# Patient Record
Sex: MALE | Race: WHITE | Employment: OTHER | URBAN - METROPOLITAN AREA
[De-identification: names, ages, dates, MRNs, and addresses within clinical notes are randomized per-mention and may not be internally consistent; named-entity substitution may affect disease eponyms.]

---

## 2019-04-18 ENCOUNTER — APPOINTMENT (OUTPATIENT)
Dept: GENERAL RADIOLOGY | Age: 74
DRG: 191 | End: 2019-04-18
Attending: EMERGENCY MEDICINE
Payer: MEDICARE

## 2019-04-18 ENCOUNTER — HOSPITAL ENCOUNTER (INPATIENT)
Age: 74
LOS: 5 days | Discharge: HOME | DRG: 191 | End: 2019-04-23
Attending: EMERGENCY MEDICINE | Admitting: HOSPITALIST
Payer: MEDICARE

## 2019-04-18 DIAGNOSIS — I50.9 ACUTE ON CHRONIC CONGESTIVE HEART FAILURE, UNSPECIFIED HEART FAILURE TYPE (HCC): ICD-10-CM

## 2019-04-18 DIAGNOSIS — I48.91 ATRIAL FIBRILLATION WITH RAPID VENTRICULAR RESPONSE (HCC): ICD-10-CM

## 2019-04-18 DIAGNOSIS — J96.01 ACUTE RESPIRATORY FAILURE WITH HYPOXIA (HCC): ICD-10-CM

## 2019-04-18 DIAGNOSIS — J44.1 COPD EXACERBATION (HCC): Primary | ICD-10-CM

## 2019-04-18 PROBLEM — R06.03 ACUTE RESPIRATORY DISTRESS: Status: ACTIVE | Noted: 2019-04-18

## 2019-04-18 PROBLEM — R79.89 ELEVATED BRAIN NATRIURETIC PEPTIDE (BNP) LEVEL: Status: ACTIVE | Noted: 2019-04-18

## 2019-04-18 LAB
ALBUMIN SERPL-MCNC: 3 G/DL (ref 3.4–5)
ALBUMIN/GLOB SERPL: 0.6 {RATIO} (ref 0.8–1.7)
ALP SERPL-CCNC: 127 U/L (ref 45–117)
ALT SERPL-CCNC: 21 U/L (ref 16–61)
ANION GAP SERPL CALC-SCNC: 7 MMOL/L (ref 3–18)
ARTERIAL PATENCY WRIST A: YES
AST SERPL-CCNC: 13 U/L (ref 15–37)
BASE DEFICIT BLD-SCNC: 8 MMOL/L
BASOPHILS # BLD: 0 K/UL (ref 0–0.1)
BASOPHILS NFR BLD: 0 % (ref 0–2)
BDY SITE: ABNORMAL
BILIRUB SERPL-MCNC: 0.7 MG/DL (ref 0.2–1)
BNP SERPL-MCNC: 7565 PG/ML (ref 0–900)
BODY TEMPERATURE: 98.5
BUN SERPL-MCNC: 47 MG/DL (ref 7–18)
BUN/CREAT SERPL: 18 (ref 12–20)
CALCIUM SERPL-MCNC: 8.5 MG/DL (ref 8.5–10.1)
CHLORIDE SERPL-SCNC: 110 MMOL/L (ref 100–108)
CO2 SERPL-SCNC: 23 MMOL/L (ref 21–32)
CREAT SERPL-MCNC: 2.57 MG/DL (ref 0.6–1.3)
DIFFERENTIAL METHOD BLD: ABNORMAL
EOSINOPHIL # BLD: 0 K/UL (ref 0–0.4)
EOSINOPHIL NFR BLD: 0 % (ref 0–5)
ERYTHROCYTE [DISTWIDTH] IN BLOOD BY AUTOMATED COUNT: 16.6 % (ref 11.6–14.5)
EST. AVERAGE GLUCOSE BLD GHB EST-MCNC: 126 MG/DL
GAS FLOW.O2 O2 DELIVERY SYS: ABNORMAL L/MIN
GAS FLOW.O2 SETTING OXYMISER: 4.5 L/M
GLOBULIN SER CALC-MCNC: 5.1 G/DL (ref 2–4)
GLUCOSE BLD STRIP.AUTO-MCNC: 208 MG/DL (ref 70–110)
GLUCOSE BLD STRIP.AUTO-MCNC: 282 MG/DL (ref 70–110)
GLUCOSE SERPL-MCNC: 120 MG/DL (ref 74–99)
HBA1C MFR BLD: 6 % (ref 4.2–5.6)
HCO3 BLD-SCNC: 18.8 MMOL/L (ref 22–26)
HCT VFR BLD AUTO: 38.4 % (ref 36–48)
HGB BLD-MCNC: 12.1 G/DL (ref 13–16)
INR PPP: 3.1 (ref 0.8–1.2)
LYMPHOCYTES # BLD: 0.9 K/UL (ref 0.9–3.6)
LYMPHOCYTES NFR BLD: 8 % (ref 21–52)
MCH RBC QN AUTO: 26.8 PG (ref 24–34)
MCHC RBC AUTO-ENTMCNC: 31.5 G/DL (ref 31–37)
MCV RBC AUTO: 85.1 FL (ref 74–97)
MONOCYTES # BLD: 0.8 K/UL (ref 0.05–1.2)
MONOCYTES NFR BLD: 8 % (ref 3–10)
NEUTS SEG # BLD: 9.1 K/UL (ref 1.8–8)
NEUTS SEG NFR BLD: 84 % (ref 40–73)
O2/TOTAL GAS SETTING VFR VENT: 38 %
PCO2 BLD: 40 MMHG (ref 35–45)
PH BLD: 7.28 [PH] (ref 7.35–7.45)
PHENYTOIN SERPL-MCNC: 5.6 UG/ML (ref 10–20)
PLATELET # BLD AUTO: 187 K/UL (ref 135–420)
PMV BLD AUTO: 9.6 FL (ref 9.2–11.8)
PO2 BLD: 73 MMHG (ref 80–100)
POTASSIUM SERPL-SCNC: 3.6 MMOL/L (ref 3.5–5.5)
PROT SERPL-MCNC: 8.1 G/DL (ref 6.4–8.2)
PROTHROMBIN TIME: 32.2 SEC (ref 11.5–15.2)
RBC # BLD AUTO: 4.51 M/UL (ref 4.7–5.5)
SAO2 % BLD: 92 % (ref 92–97)
SERVICE CMNT-IMP: ABNORMAL
SODIUM SERPL-SCNC: 140 MMOL/L (ref 136–145)
SPECIMEN TYPE: ABNORMAL
T4 FREE SERPL-MCNC: 1.5 NG/DL (ref 0.7–1.5)
TOTAL RESP. RATE, ITRR: 20
TROPONIN I SERPL-MCNC: 0.02 NG/ML (ref 0–0.04)
TSH SERPL DL<=0.05 MIU/L-ACNC: 0.41 UIU/ML (ref 0.36–3.74)
WBC # BLD AUTO: 10.8 K/UL (ref 4.6–13.2)

## 2019-04-18 PROCEDURE — 94640 AIRWAY INHALATION TREATMENT: CPT

## 2019-04-18 PROCEDURE — 80053 COMPREHEN METABOLIC PANEL: CPT

## 2019-04-18 PROCEDURE — 93005 ELECTROCARDIOGRAM TRACING: CPT

## 2019-04-18 PROCEDURE — 74011000250 HC RX REV CODE- 250: Performed by: PHYSICIAN ASSISTANT

## 2019-04-18 PROCEDURE — 74011250636 HC RX REV CODE- 250/636: Performed by: EMERGENCY MEDICINE

## 2019-04-18 PROCEDURE — 96365 THER/PROPH/DIAG IV INF INIT: CPT

## 2019-04-18 PROCEDURE — 96375 TX/PRO/DX INJ NEW DRUG ADDON: CPT

## 2019-04-18 PROCEDURE — 82962 GLUCOSE BLOOD TEST: CPT

## 2019-04-18 PROCEDURE — 36600 WITHDRAWAL OF ARTERIAL BLOOD: CPT

## 2019-04-18 PROCEDURE — 84443 ASSAY THYROID STIM HORMONE: CPT

## 2019-04-18 PROCEDURE — 94761 N-INVAS EAR/PLS OXIMETRY MLT: CPT

## 2019-04-18 PROCEDURE — 84439 ASSAY OF FREE THYROXINE: CPT

## 2019-04-18 PROCEDURE — 74011250636 HC RX REV CODE- 250/636: Performed by: PHYSICIAN ASSISTANT

## 2019-04-18 PROCEDURE — 83036 HEMOGLOBIN GLYCOSYLATED A1C: CPT

## 2019-04-18 PROCEDURE — 87086 URINE CULTURE/COLONY COUNT: CPT

## 2019-04-18 PROCEDURE — 74011250637 HC RX REV CODE- 250/637: Performed by: PHYSICIAN ASSISTANT

## 2019-04-18 PROCEDURE — 85025 COMPLETE CBC W/AUTO DIFF WBC: CPT

## 2019-04-18 PROCEDURE — 74011000250 HC RX REV CODE- 250: Performed by: EMERGENCY MEDICINE

## 2019-04-18 PROCEDURE — 74011636637 HC RX REV CODE- 636/637: Performed by: HOSPITALIST

## 2019-04-18 PROCEDURE — 65660000001 HC RM ICU INTERMED STEPDOWN

## 2019-04-18 PROCEDURE — 99285 EMERGENCY DEPT VISIT HI MDM: CPT

## 2019-04-18 PROCEDURE — 96366 THER/PROPH/DIAG IV INF ADDON: CPT

## 2019-04-18 PROCEDURE — 96376 TX/PRO/DX INJ SAME DRUG ADON: CPT

## 2019-04-18 PROCEDURE — 74011000258 HC RX REV CODE- 258: Performed by: PHYSICIAN ASSISTANT

## 2019-04-18 PROCEDURE — 85610 PROTHROMBIN TIME: CPT

## 2019-04-18 PROCEDURE — 82803 BLOOD GASES ANY COMBINATION: CPT

## 2019-04-18 PROCEDURE — 74011000258 HC RX REV CODE- 258: Performed by: EMERGENCY MEDICINE

## 2019-04-18 PROCEDURE — 80185 ASSAY OF PHENYTOIN TOTAL: CPT

## 2019-04-18 PROCEDURE — 74011000250 HC RX REV CODE- 250

## 2019-04-18 PROCEDURE — 84484 ASSAY OF TROPONIN QUANT: CPT

## 2019-04-18 PROCEDURE — 71045 X-RAY EXAM CHEST 1 VIEW: CPT

## 2019-04-18 PROCEDURE — 74011250636 HC RX REV CODE- 250/636

## 2019-04-18 PROCEDURE — 77030013140 HC MSK NEB VYRM -A

## 2019-04-18 PROCEDURE — 83880 ASSAY OF NATRIURETIC PEPTIDE: CPT

## 2019-04-18 RX ORDER — ASPIRIN 81 MG/1
81 TABLET ORAL DAILY
Status: DISCONTINUED | OUTPATIENT
Start: 2019-04-19 | End: 2019-04-23 | Stop reason: HOSPADM

## 2019-04-18 RX ORDER — DIGOXIN 0.25 MG/ML
250 INJECTION INTRAMUSCULAR; INTRAVENOUS
Status: COMPLETED | OUTPATIENT
Start: 2019-04-18 | End: 2019-04-18

## 2019-04-18 RX ORDER — INSULIN LISPRO 100 [IU]/ML
INJECTION, SOLUTION INTRAVENOUS; SUBCUTANEOUS
Status: DISCONTINUED | OUTPATIENT
Start: 2019-04-18 | End: 2019-04-18

## 2019-04-18 RX ORDER — BENZONATATE 100 MG/1
100 CAPSULE ORAL
COMMUNITY
End: 2020-01-27

## 2019-04-18 RX ORDER — ALBUTEROL SULFATE 90 UG/1
2 AEROSOL, METERED RESPIRATORY (INHALATION)
COMMUNITY
End: 2020-07-31

## 2019-04-18 RX ORDER — LANOLIN ALCOHOL/MO/W.PET/CERES
65 CREAM (GRAM) TOPICAL DAILY
COMMUNITY

## 2019-04-18 RX ORDER — DIGOXIN 0.25 MG/ML
125 INJECTION INTRAMUSCULAR; INTRAVENOUS ONCE
Status: COMPLETED | OUTPATIENT
Start: 2019-04-18 | End: 2019-04-18

## 2019-04-18 RX ORDER — WARFARIN 1 MG/1
5 TABLET ORAL DAILY
COMMUNITY
End: 2020-01-27

## 2019-04-18 RX ORDER — METOPROLOL TARTRATE 5 MG/5ML
5 INJECTION INTRAVENOUS ONCE
Status: COMPLETED | OUTPATIENT
Start: 2019-04-18 | End: 2019-04-18

## 2019-04-18 RX ORDER — LEVOTHYROXINE SODIUM 112 UG/1
112 TABLET ORAL
COMMUNITY

## 2019-04-18 RX ORDER — ALLOPURINOL 100 MG/1
100 TABLET ORAL DAILY
Status: DISCONTINUED | OUTPATIENT
Start: 2019-04-19 | End: 2019-04-23 | Stop reason: HOSPADM

## 2019-04-18 RX ORDER — ALBUTEROL SULFATE 2.5 MG/.5ML
1.25 SOLUTION RESPIRATORY (INHALATION)
Status: COMPLETED | OUTPATIENT
Start: 2019-04-18 | End: 2019-04-18

## 2019-04-18 RX ORDER — WARFARIN SODIUM 5 MG/1
5 TABLET ORAL DAILY
Status: DISCONTINUED | OUTPATIENT
Start: 2019-04-19 | End: 2019-04-23 | Stop reason: HOSPADM

## 2019-04-18 RX ORDER — AMIODARONE HCL/D5W 450 MG/250
1 PLASTIC BAG, INJECTION (ML) INTRAVENOUS
Status: DISCONTINUED | OUTPATIENT
Start: 2019-04-19 | End: 2019-04-19

## 2019-04-18 RX ORDER — ATORVASTATIN CALCIUM 40 MG/1
40 TABLET, FILM COATED ORAL
COMMUNITY

## 2019-04-18 RX ORDER — INSULIN LISPRO 100 [IU]/ML
INJECTION, SOLUTION INTRAVENOUS; SUBCUTANEOUS
Status: DISCONTINUED | OUTPATIENT
Start: 2019-04-18 | End: 2019-04-23 | Stop reason: HOSPADM

## 2019-04-18 RX ORDER — ACETAMINOPHEN 325 MG/1
650 TABLET ORAL
Status: DISCONTINUED | OUTPATIENT
Start: 2019-04-18 | End: 2019-04-23 | Stop reason: HOSPADM

## 2019-04-18 RX ORDER — BUMETANIDE 0.25 MG/ML
1 INJECTION INTRAMUSCULAR; INTRAVENOUS ONCE
Status: DISCONTINUED | OUTPATIENT
Start: 2019-04-18 | End: 2019-04-18

## 2019-04-18 RX ORDER — MAGNESIUM SULFATE 100 %
4 CRYSTALS MISCELLANEOUS AS NEEDED
Status: DISCONTINUED | OUTPATIENT
Start: 2019-04-18 | End: 2019-04-23 | Stop reason: HOSPADM

## 2019-04-18 RX ORDER — BUMETANIDE 0.25 MG/ML
1 INJECTION INTRAMUSCULAR; INTRAVENOUS ONCE
Status: DISCONTINUED | OUTPATIENT
Start: 2019-04-19 | End: 2019-04-18 | Stop reason: SDUPTHER

## 2019-04-18 RX ORDER — METOPROLOL TARTRATE 50 MG/1
50 TABLET ORAL
Status: COMPLETED | OUTPATIENT
Start: 2019-04-18 | End: 2019-04-18

## 2019-04-18 RX ORDER — METOPROLOL TARTRATE 5 MG/5ML
INJECTION INTRAVENOUS
Status: COMPLETED
Start: 2019-04-18 | End: 2019-04-18

## 2019-04-18 RX ORDER — ASPIRIN 81 MG/1
81 TABLET ORAL DAILY
COMMUNITY
End: 2021-10-12

## 2019-04-18 RX ORDER — ALBUTEROL SULFATE 2.5 MG/.5ML
5 SOLUTION RESPIRATORY (INHALATION)
Status: COMPLETED | OUTPATIENT
Start: 2019-04-18 | End: 2019-04-18

## 2019-04-18 RX ORDER — PHENOL/SODIUM PHENOLATE
20 AEROSOL, SPRAY (ML) MUCOUS MEMBRANE DAILY
COMMUNITY
End: 2021-05-05 | Stop reason: DRUGHIGH

## 2019-04-18 RX ORDER — PHENYTOIN SODIUM 100 MG/1
300 CAPSULE, EXTENDED RELEASE ORAL
Status: DISCONTINUED | OUTPATIENT
Start: 2019-04-18 | End: 2019-04-23 | Stop reason: HOSPADM

## 2019-04-18 RX ORDER — ARFORMOTEROL TARTRATE 15 UG/2ML
15 SOLUTION RESPIRATORY (INHALATION)
Status: DISCONTINUED | OUTPATIENT
Start: 2019-04-18 | End: 2019-04-23 | Stop reason: HOSPADM

## 2019-04-18 RX ORDER — MELATONIN
2000 DAILY
Status: DISCONTINUED | OUTPATIENT
Start: 2019-04-19 | End: 2019-04-23 | Stop reason: HOSPADM

## 2019-04-18 RX ORDER — BUMETANIDE 0.25 MG/ML
1 INJECTION INTRAMUSCULAR; INTRAVENOUS EVERY 12 HOURS
Status: DISCONTINUED | OUTPATIENT
Start: 2019-04-19 | End: 2019-04-19

## 2019-04-18 RX ORDER — ONDANSETRON 4 MG/1
4 TABLET, ORALLY DISINTEGRATING ORAL
Status: DISCONTINUED | OUTPATIENT
Start: 2019-04-18 | End: 2019-04-23 | Stop reason: HOSPADM

## 2019-04-18 RX ORDER — BUDESONIDE 0.5 MG/2ML
500 INHALANT ORAL
Status: DISCONTINUED | OUTPATIENT
Start: 2019-04-18 | End: 2019-04-23 | Stop reason: HOSPADM

## 2019-04-18 RX ORDER — DEXTROSE MONOHYDRATE 25 G/50ML
25-50 INJECTION, SOLUTION INTRAVENOUS AS NEEDED
Status: DISCONTINUED | OUTPATIENT
Start: 2019-04-18 | End: 2019-04-23 | Stop reason: HOSPADM

## 2019-04-18 RX ORDER — ALLOPURINOL 100 MG/1
100 TABLET ORAL DAILY
COMMUNITY
End: 2021-03-17 | Stop reason: DRUGHIGH

## 2019-04-18 RX ORDER — IPRATROPIUM BROMIDE AND ALBUTEROL SULFATE 2.5; .5 MG/3ML; MG/3ML
3 SOLUTION RESPIRATORY (INHALATION)
Status: DISCONTINUED | OUTPATIENT
Start: 2019-04-18 | End: 2019-04-18

## 2019-04-18 RX ORDER — FINASTERIDE 5 MG/1
5 TABLET, FILM COATED ORAL DAILY
COMMUNITY
End: 2020-01-27

## 2019-04-18 RX ORDER — METOPROLOL TARTRATE 100 MG/1
100 TABLET ORAL 2 TIMES DAILY
COMMUNITY
End: 2020-01-27

## 2019-04-18 RX ORDER — ATORVASTATIN CALCIUM 40 MG/1
40 TABLET, FILM COATED ORAL DAILY
Status: DISCONTINUED | OUTPATIENT
Start: 2019-04-19 | End: 2019-04-23 | Stop reason: HOSPADM

## 2019-04-18 RX ORDER — CHOLECALCIFEROL (VITAMIN D3) 125 MCG
1000 CAPSULE ORAL DAILY
COMMUNITY

## 2019-04-18 RX ORDER — WARFARIN 2.5 MG/1
5 TABLET ORAL DAILY
Status: DISCONTINUED | OUTPATIENT
Start: 2019-04-19 | End: 2019-04-18

## 2019-04-18 RX ORDER — FUROSEMIDE 10 MG/ML
40 INJECTION INTRAMUSCULAR; INTRAVENOUS
Status: COMPLETED | OUTPATIENT
Start: 2019-04-18 | End: 2019-04-18

## 2019-04-18 RX ORDER — DIGOXIN 0.25 MG/ML
INJECTION INTRAMUSCULAR; INTRAVENOUS
Status: COMPLETED
Start: 2019-04-18 | End: 2019-04-18

## 2019-04-18 RX ORDER — BUMETANIDE 0.25 MG/ML
1 INJECTION INTRAMUSCULAR; INTRAVENOUS ONCE
Status: COMPLETED | OUTPATIENT
Start: 2019-04-18 | End: 2019-04-18

## 2019-04-18 RX ORDER — TAMSULOSIN HYDROCHLORIDE 0.4 MG/1
0.4 CAPSULE ORAL DAILY
Status: DISCONTINUED | OUTPATIENT
Start: 2019-04-19 | End: 2019-04-23 | Stop reason: HOSPADM

## 2019-04-18 RX ORDER — METOPROLOL TARTRATE 5 MG/5ML
5 INJECTION INTRAVENOUS
Status: COMPLETED | OUTPATIENT
Start: 2019-04-18 | End: 2019-04-18

## 2019-04-18 RX ORDER — PHENYTOIN SODIUM 100 MG/1
300 CAPSULE, EXTENDED RELEASE ORAL
COMMUNITY
End: 2021-05-05

## 2019-04-18 RX ORDER — TAMSULOSIN HYDROCHLORIDE 0.4 MG/1
0.4 CAPSULE ORAL DAILY
COMMUNITY
End: 2020-01-27

## 2019-04-18 RX ORDER — PANTOPRAZOLE SODIUM 40 MG/1
40 TABLET, DELAYED RELEASE ORAL
Status: DISCONTINUED | OUTPATIENT
Start: 2019-04-19 | End: 2019-04-20

## 2019-04-18 RX ORDER — DILTIAZEM HYDROCHLORIDE 5 MG/ML
20 INJECTION INTRAVENOUS
Status: COMPLETED | OUTPATIENT
Start: 2019-04-18 | End: 2019-04-18

## 2019-04-18 RX ADMIN — DIGOXIN 125 MCG: 250 INJECTION, SOLUTION INTRAMUSCULAR; INTRAVENOUS; PARENTERAL at 21:33

## 2019-04-18 RX ADMIN — DIGOXIN 250 MCG: 0.25 INJECTION INTRAMUSCULAR; INTRAVENOUS at 14:21

## 2019-04-18 RX ADMIN — METHYLPREDNISOLONE SODIUM SUCCINATE 125 MG: 125 INJECTION, POWDER, FOR SOLUTION INTRAMUSCULAR; INTRAVENOUS at 10:26

## 2019-04-18 RX ADMIN — DILTIAZEM HYDROCHLORIDE 10 MG/HR: 5 INJECTION INTRAVENOUS at 10:30

## 2019-04-18 RX ADMIN — FUROSEMIDE 40 MG: 10 INJECTION, SOLUTION INTRAMUSCULAR; INTRAVENOUS at 11:48

## 2019-04-18 RX ADMIN — ALBUTEROL SULFATE 5 MG: 2.5 SOLUTION RESPIRATORY (INHALATION) at 10:23

## 2019-04-18 RX ADMIN — PHENYTOIN SODIUM 300 MG: 100 CAPSULE ORAL at 23:35

## 2019-04-18 RX ADMIN — METOPROLOL TARTRATE 5 MG: 5 INJECTION INTRAVENOUS at 15:36

## 2019-04-18 RX ADMIN — INSULIN LISPRO 4 UNITS: 100 INJECTION, SOLUTION INTRAVENOUS; SUBCUTANEOUS at 18:24

## 2019-04-18 RX ADMIN — INSULIN LISPRO 6 UNITS: 100 INJECTION, SOLUTION INTRAVENOUS; SUBCUTANEOUS at 22:00

## 2019-04-18 RX ADMIN — BUMETANIDE 1 MG: 0.25 INJECTION INTRAMUSCULAR; INTRAVENOUS at 18:12

## 2019-04-18 RX ADMIN — DIGOXIN 250 MCG: 0.25 INJECTION INTRAMUSCULAR; INTRAVENOUS at 16:12

## 2019-04-18 RX ADMIN — METOPROLOL TARTRATE 5 MG: 5 INJECTION INTRAVENOUS at 16:00

## 2019-04-18 RX ADMIN — DILTIAZEM HYDROCHLORIDE 20 MG: 5 INJECTION INTRAVENOUS at 10:25

## 2019-04-18 RX ADMIN — METOPROLOL TARTRATE 50 MG: 50 TABLET ORAL at 18:12

## 2019-04-18 RX ADMIN — ALBUTEROL SULFATE 1.25 MG: 2.5 SOLUTION RESPIRATORY (INHALATION) at 11:03

## 2019-04-18 RX ADMIN — ARFORMOTEROL TARTRATE 15 MCG: 15 SOLUTION RESPIRATORY (INHALATION) at 19:43

## 2019-04-18 RX ADMIN — BUDESONIDE 500 MCG: 0.5 INHALANT RESPIRATORY (INHALATION) at 19:43

## 2019-04-18 RX ADMIN — SODIUM CHLORIDE 500 MG: 900 INJECTION, SOLUTION INTRAVENOUS at 23:35

## 2019-04-18 RX ADMIN — DILTIAZEM HYDROCHLORIDE 20 MG/HR: 5 INJECTION INTRAVENOUS at 21:35

## 2019-04-18 RX ADMIN — METOPROLOL TARTRATE 5 MG: 5 INJECTION INTRAVENOUS at 11:45

## 2019-04-18 RX ADMIN — METHYLPREDNISOLONE SODIUM SUCCINATE 60 MG: 40 INJECTION, POWDER, FOR SOLUTION INTRAMUSCULAR; INTRAVENOUS at 18:12

## 2019-04-18 NOTE — ED PROVIDER NOTES
EMERGENCY DEPARTMENT HISTORY AND PHYSICAL EXAM 
 
10:00 AM 
 
 
Date: 4/18/2019 Patient Name: Makayla Lau History of Presenting Illness Chief Complaint Patient presents with  Shortness of Breath  COPD History Provided By: patient Additional History (Context): Makayla Lau is a 69 y.o. male presents with shortness of breath and wheezing for 1 week. Previously seen by his primary care doctor 1 week ago and not any better. No chest pain. His symptoms are severe and constant. Other significant history heart valve replacement, on Coumadin, stroke, CHF, MI, History and review of systems limited by poor historian. Rosamaria Isaacs PCP: Kiya Upton MD 
 
Chief Complaint:  
Duration:   
Timing: Location:  
Quality:  
Severity:  
Modifying Factors:  
Associated Symptoms:  
 
 
Current Facility-Administered Medications Medication Dose Route Frequency Provider Last Rate Last Dose  dilTIAZem (CARDIZEM) 125 mg in 0.9% sodium chloride 125 mL infusion  10 mg/hr IntraVENous TITRATE Delmi COWART MD 20 mL/hr at 04/18/19 1122 20 mg/hr at 04/18/19 1122 Past History Past Medical History: 
Past Medical History:  
Diagnosis Date  Chronic obstructive pulmonary disease ([facility]  MI (myocardial infarction) ([facility] Past Surgical History: 
Past Surgical History:  
Procedure Laterality Date  CARDIAC SURG PROCEDURE UNLIST Family History: No family history on file. Social History: 
Social History Tobacco Use  Smoking status: Current Every Day Smoker  Smokeless tobacco: Never Used Substance Use Topics  Alcohol use: Not on file  Drug use: Not on file Allergies: 
No Known Allergies Review of Systems Review of Systems Constitutional: Negative for diaphoresis and fever. HENT: Negative for congestion and sore throat. Eyes: Negative for pain and itching. Respiratory: Positive for cough, shortness of breath and wheezing. Cardiovascular: Negative for chest pain and palpitations. Gastrointestinal: Negative for abdominal pain and diarrhea. Endocrine: Negative for polydipsia and polyuria. Genitourinary: Negative for dysuria and hematuria. Musculoskeletal: Negative for arthralgias and myalgias. Skin: Negative for rash and wound. Neurological: Negative for seizures and syncope. Hematological: Does not bruise/bleed easily. Psychiatric/Behavioral: Negative for agitation and hallucinations. Physical Exam  
 
 
Patient Vitals for the past 12 hrs: 
 Temp Pulse Resp BP SpO2  
04/18/19 1145  (!) 148  119/84   
04/18/19 1115  (!) 150 24 135/84 97 % 04/18/19 1103  (!) 160  120/87   
04/18/19 1100  (!) 159 28 120/87 99 % 04/18/19 1057  (!) 176 30 113/84 93 % 04/18/19 1045  (!) 159 28 119/74 92 % 04/18/19 1030  (!) 155 28 128/86 97 % 04/18/19 1023  (!) 150  (!) 137/92   
04/18/19 1020  (!) 146 26 (!) 137/92 98 % 04/18/19 0900    129/78 (!) 88 % 04/18/19 0855 98.5 °F (36.9 °C) 77 22 132/89 (!) 87 % Physical Exam  
Constitutional: He appears well-developed and well-nourished. HENT:  
Head: Normocephalic and atraumatic. Eyes: Conjunctivae are normal. No scleral icterus. Neck: Normal range of motion. Neck supple. No JVD present. Cardiovascular: Normal heart sounds. 4 intact extremity pulses tachycardic irregular irregular rhythm. Pulmonary/Chest: He is in respiratory distress. He has wheezes. Abdominal: Soft. He exhibits no mass. There is no tenderness. Reducible periumbilical hernia. Nontender. Musculoskeletal: Normal range of motion. He exhibits no edema. Lymphadenopathy:  
  He has no cervical adenopathy. Neurological: He is alert. Skin: Skin is warm and dry. Nursing note and vitals reviewed. Diagnostic Study Results Labs - Recent Results (from the past 12 hour(s)) EKG, 12 LEAD, INITIAL Collection Time: 04/18/19  9:16 AM  
Result Value Ref Range Ventricular Rate 162 BPM  
 Atrial Rate 162 BPM  
 P-R Interval 128 ms QRS Duration 80 ms  
 Q-T Interval 266 ms  
 QTC Calculation (Bezet) 436 ms Calculated R Axis 103 degrees Calculated T Axis 110 degrees Diagnosis Sinus tachycardia Rightward axis Pulmonary disease pattern Nonspecific ST abnormality Abnormal ECG No previous ECGs available CBC WITH AUTOMATED DIFF Collection Time: 04/18/19 10:00 AM  
Result Value Ref Range WBC 10.8 4.6 - 13.2 K/uL  
 RBC 4.51 (L) 4.70 - 5.50 M/uL  
 HGB 12.1 (L) 13.0 - 16.0 g/dL HCT 38.4 36.0 - 48.0 % MCV 85.1 74.0 - 97.0 FL  
 MCH 26.8 24.0 - 34.0 PG  
 MCHC 31.5 31.0 - 37.0 g/dL  
 RDW 16.6 (H) 11.6 - 14.5 % PLATELET 481 178 - 337 K/uL MPV 9.6 9.2 - 11.8 FL  
 NEUTROPHILS 84 (H) 40 - 73 % LYMPHOCYTES 8 (L) 21 - 52 % MONOCYTES 8 3 - 10 % EOSINOPHILS 0 0 - 5 % BASOPHILS 0 0 - 2 %  
 ABS. NEUTROPHILS 9.1 (H) 1.8 - 8.0 K/UL  
 ABS. LYMPHOCYTES 0.9 0.9 - 3.6 K/UL  
 ABS. MONOCYTES 0.8 0.05 - 1.2 K/UL  
 ABS. EOSINOPHILS 0.0 0.0 - 0.4 K/UL  
 ABS. BASOPHILS 0.0 0.0 - 0.1 K/UL  
 DF AUTOMATED METABOLIC PANEL, COMPREHENSIVE Collection Time: 04/18/19 10:00 AM  
Result Value Ref Range Sodium 140 136 - 145 mmol/L Potassium 3.6 3.5 - 5.5 mmol/L Chloride 110 (H) 100 - 108 mmol/L  
 CO2 23 21 - 32 mmol/L Anion gap 7 3.0 - 18 mmol/L Glucose 120 (H) 74 - 99 mg/dL BUN 47 (H) 7.0 - 18 MG/DL Creatinine 2.57 (H) 0.6 - 1.3 MG/DL  
 BUN/Creatinine ratio 18 12 - 20 GFR est AA 30 (L) >60 ml/min/1.73m2 GFR est non-AA 25 (L) >60 ml/min/1.73m2 Calcium 8.5 8.5 - 10.1 MG/DL Bilirubin, total 0.7 0.2 - 1.0 MG/DL  
 ALT (SGPT) 21 16 - 61 U/L  
 AST (SGOT) 13 (L) 15 - 37 U/L Alk. phosphatase 127 (H) 45 - 117 U/L Protein, total 8.1 6.4 - 8.2 g/dL Albumin 3.0 (L) 3.4 - 5.0 g/dL Globulin 5.1 (H) 2.0 - 4.0 g/dL A-G Ratio 0.6 (L) 0.8 - 1.7 PROTHROMBIN TIME + INR  Collection Time: 04/18/19 10:00 AM  
 Result Value Ref Range Prothrombin time 32.2 (H) 11.5 - 15.2 sec INR 3.1 (H) 0.8 - 1.2 NT-PRO BNP Collection Time: 04/18/19 10:00 AM  
Result Value Ref Range NT pro-BNP 7,565 (H) 0 - 900 PG/ML  
TROPONIN I Collection Time: 04/18/19 10:00 AM  
Result Value Ref Range Troponin-I, QT 0.02 0.0 - 0.045 NG/ML Radiologic Studies -  
XR CHEST PORT    (Results Pending) No results found. Medications ordered:  
Medications  
dilTIAZem (CARDIZEM) 125 mg in 0.9% sodium chloride 125 mL infusion (20 mg/hr IntraVENous Rate Change 4/18/19 1122)  
albuterol CONCENTRATE 2.5mg/0.5 mL neb soln (5 mg Nebulization Given 4/18/19 1023) methylPREDNISolone (PF) (Solu-MEDROL) injection 125 mg (125 mg IntraVENous Given 4/18/19 1026)  
dilTIAZem (CARDIZEM) injection 20 mg (20 mg IntraVENous Given 4/18/19 1025)  
albuterol CONCENTRATE 2.5mg/0.5 mL neb soln (1.25 mg Nebulization Given 4/18/19 1103) metoprolol (LOPRESSOR) injection 5 mg (5 mg IntraVENous Given 4/18/19 1145) furosemide (LASIX) injection 40 mg (40 mg IntraVENous Given 4/18/19 1148) Medical Decision Making Initial Medical Decision Making and DDx: 
He also has a history of atrial fibrillation and appears to be in A. fib with rapid ventricular response. He did not take any of his morning medications. Also consistent with COPD exacerbation. Doubt CHF pneumonia PE. 
ED Course: Progress Notes, Reevaluation, and Consults: 
ED Course as of Apr 18 1152 Thu Apr 18, 2019  
1134 Heart rate is still 160, with diltiazem at 20. Will add 5 of Lopressor. Still prominent wheezing, 91% on nasal cannula. [CB] 1136 Critical care time 35 minutes related to hypoxia and atrial fibrillation with rapid ventricular response, managing Cardizem and metoprolol.  
 [CB] [facility] [facility] hospitalist  
 [CB] ED Course User Index 
[CB] Lanny Farias MD  
 
 
I am the first provider for this patient. I reviewed the vital signs, available nursing notes, past medical history, past surgical history, family history and social history. Patient Vitals for the past 12 hrs: 
 Temp Pulse Resp BP SpO2  
04/18/19 1145  (!) 148  119/84   
04/18/19 1115  (!) 150 24 135/84 97 % 04/18/19 1103  (!) 160  120/87   
04/18/19 1100  (!) 159 28 120/87 99 % 04/18/19 1057  (!) 176 30 113/84 93 % 04/18/19 1045  (!) 159 28 119/74 92 % 04/18/19 1030  (!) 155 28 128/86 97 % 04/18/19 1023  (!) 150  (!) 137/92   
04/18/19 1020  (!) 146 26 (!) 137/92 98 % 04/18/19 0900    129/78 (!) 88 % 04/18/19 0855 98.5 °F (36.9 °C) 77 22 132/89 (!) 87 % Vital Signs-Reviewed the patient's vital signs. Pulse Oximetry Analysis, Cardiac Monitor, 12 lead ekg: 
   Narrow complex tachycardia at a rate of 160, 12-lead 9:16 AM sinus tachycardia at 162 more likely atrial fibrillation with rapid ventricular response Interpreted by the EP. 
 
11:52 AM discussed with Dr. Calvillo Pac hospitalist who will see and admit, COPD exacerbation, fluid overload, albuterol Lasix and steroids given, atrial fibrillation with rapid ventricular response IV Cardizem and Lopressor given, patient's home meds for this given. Records Reviewed: Nursing notes reviewed (Time of Review: 10:00 AM) Procedures:  
Critical Care Time:  
Aspirin: (was aspirin given for stroke?) Diagnosis Clinical Impression: 1. COPD exacerbation ([address] 2. Acute respiratory failure with hypoxia ([address] 3. Acute on chronic congestive heart failure, unspecified heart failure type ([address] 4. Atrial fibrillation with rapid ventricular response ([facility] [address] Disposition: Admitted Follow-up Information None Patient's Medications No medications on file  
 
_______________________________ Notes:   
JohnMD shellie Rayo     
_______________________________

## 2019-04-18 NOTE — PROGRESS NOTES
Chart reviewed and pt verified demographics. He was wheezing and sounded poorly, but able to speak and said he was ok to interview. He lives with his wife and his daughter Adrianna Woods in [address] [place] [address]. He has [place] a and b. He has PCP Dr Lance Pratt at the [place] [id number]. He says he does not go to [facility] [place], because they almost killed him. He says he see Cardiology at the [place] [address] also, he does not know name because they partner with [facility] and the Doctors rotate. He also states he has not been to [place] [address] for Cardiologist in about a year and a half. He states his wife has Parkinson's Disease and that she is using the walker he had been prescribed. He sounded capable but three things I noted during charting. He told me he has last seen his PCP in Oct 2018, but told the ER Doctor he went to PCP last week. He told me he did not have any respiratory issues in the past, but ER states he has history of COPD. And he told me he did not want a H visit \" because his daughters home was too small\", which does not make sense. During my interview, he was wheezing and maybe just gave wrong answers because he did not feel good or  Did not care about my interview. Reason for Admission:   COPD, Acute respiratory distress, a fib with RVR 
                
RRAT Score:       5 Plan for utilizing home health:    I discussed and offered [facility] home visit, but he says he can't get It right now . He says he and his wife live with daughter Adrianna Woods in [address] and that it is too small, the [facility] would have to wait until daughter also bought the house next door. I asked him what he meant, because the [facility] visit was one time, one short visit. He said \" no, not now\". Current Advanced Directive/Advance Care Plan: no 
 
Likelihood of Readmission:  Yellow/mod Transition of Care Plan:     Home vs home with home health Patient has designated ______wife_________ to participate in his/her discharge plan and to receive any needed information. Name: Mor Smith Address: 
Phone number: [phone], pt states she has no cell phone. Care Management Interventions PCP Verified by CM: Yes(He told me Oct 2018) Mode of Transport at Discharge: Self Transition of Care Consult (CM Consult): Discharge Planning MyChart Signup: No 
Discharge Durable Medical Equipment: No 
Physical Therapy Consult: No 
Occupational Therapy Consult: No 
Speech Therapy Consult: No 
Current Support Network: Lives with Spouse, Relative's Home(He says its his daughter's home and she lives there also) Confirm Follow Up Transport: Self Plan discussed with Pt/Family/Caregiver:  Yes

## 2019-04-18 NOTE — ED NOTES
[facility] present and new orders received.

## 2019-04-18 NOTE — ED TRIAGE NOTES
Pt c/o increased SOB since seeing  on Tuesday. States was prescribed medications but continues to get worse.

## 2019-04-18 NOTE — ED NOTES
TRANSFER - ED to INPATIENT REPORT: 
 
SBAR report made available to receiving floor on this patient being transferred to [address] [place] [address] ([facility])  for change in patient condition(Acute Respiratory Distress) Admitting diagnosis Acute respiratory distress [R06.03] Information from the following report(s) SBAR, Kardex, ED Summary, Intake/Output, MAR, Recent Results, Med Rec Status and Cardiac Rhythm Sinus Tach w/history of A-Fib was made available to receiving floor. Lines:  
Peripheral IV 04/18/19 Right Hand (Active) Site Assessment Clean, dry, & intact 4/18/2019 11:07 AM  
Phlebitis Assessment 0 4/18/2019 11:07 AM  
Infiltration Assessment 0 4/18/2019 11:07 AM  
Dressing Status Clean, dry, & intact 4/18/2019 11:07 AM  
Dressing Type Transparent 4/18/2019 11:07 AM  
  
 
Medication list updated today Opportunity for questions and clarification was provided. Patient is oriented to time, place, person and situation High alert med Patient is  continent and ambulatory with assist 
  
Valuables transported with patient credit cards which patient wants to get to wife. Patient transported with: 
 Monitor O2 @ 2 liters Patient's medications from home Registered Nurse MAP (Monitor): 85 =Monitored (most recent) Vitals w/ MEWS Score (last day) Date/Time MEWS Score Pulse Resp Temp BP Level of Consciousness SpO2  
 04/18/19 1315    153  (Abnormal)   20    128/77    92 % 04/18/19 1145    148  (Abnormal)       119/84      
 04/18/19 1115    150  (Abnormal)   24    135/84    97 % 04/18/19 1103    160  (Abnormal)       120/87      
 04/18/19 1100    159  (Abnormal)   28    120/87    99 % 04/18/19 1057    176  (Abnormal)   30    113/84    93 % 04/18/19 1045    159  (Abnormal)   28    119/74    92 % 04/18/19 1030    155  (Abnormal)   28    128/86    97 % 04/18/19 1023    150  (Abnormal)       137/92  (Abnormal)      04/18/19 1020    146  (Abnormal)   26    137/92  (Abnormal)     98 % 04/18/19 0900          129/78    88 %  (Abnormal) 04/18/19 0855  2  77  22  98.5 °F (36.9 °C)  132/89  Alert  87 %  (Abnormal) Report to Alycia Locke came and evaluated pt and patient accepted on [place].

## 2019-04-18 NOTE — ED NOTES
Bedside report updated with Carolny Farias, and Cardizem drip verified. Medications in valuables bags and to Pharmacy, Carolyn Farias RN and pt aware of this.

## 2019-04-18 NOTE — PROGRESS NOTES
Internal Medicine History and Physical 
   
 
 
Subjective HPI: Frantz Hennessy is a 69 y.o. male with a PMHx of COPD, MI, Afib, and Per ED notes (HF, valve replacement), who presented to the ED with one week of worsening shortness of breath, and a productive cough. Recently saw his PCP approx 2 days ago and was given steroids. Patient did not improve and came to the ED. He is a chronic smoker, denies chest pains, palpations, edema, weight gain. He appears sicker than he is stating with his minimal complaints of only SOB and cough In the ED he was found to be in afib, cardizem ggt started, elevated BNP, he was transferred to step-down, cardiology consulted. He will be admitted for further eval 
 
 
PMHx: 
Past Medical History:  
Diagnosis Date  Chronic obstructive pulmonary disease ([facility]  MI (myocardial infarction) ([facility] [address] PSurgHx: 
Past Surgical History:  
Procedure Laterality Date  CARDIAC SURG PROCEDURE UNLIST SocialHx: 
Social History Socioeconomic History  Marital status:  Spouse name: Not on file  Number of children: Not on file  Years of education: Not on file  Highest education level: Not on file Occupational History  Not on file Social Needs  Financial resource strain: Not on file  Food insecurity:  
  Worry: Not on file Inability: Not on file  Transportation needs:  
  Medical: Not on file Non-medical: Not on file Tobacco Use  Smoking status: Current Every Day Smoker  Smokeless tobacco: Never Used Substance and Sexual Activity  Alcohol use: Not on file  Drug use: Not on file  Sexual activity: Not on file Lifestyle  Physical activity:  
  Days per week: Not on file Minutes per session: Not on file  Stress: Not on file Relationships  Social connections:  
  Talks on phone: Not on file Gets together: Not on file Attends Nondenominational service: Not on file Active member of club or organization: Not on file Attends meetings of clubs or organizations: Not on file Relationship status: Not on file  Intimate partner violence:  
  Fear of current or ex partner: Not on file Emotionally abused: Not on file Physically abused: Not on file Forced sexual activity: Not on file Other Topics Concern  Not on file Social History Narrative  Not on file Allergies: 
No Known Allergies FamilyHx: 
No family history on file. Prior to Admission Medications Prescriptions Last Dose Informant Patient Reported? Taking? Omeprazole delayed release (PRILOSEC D/R) 20 mg tablet 4/17/2019 at Unknown time  Yes Yes Sig: Take 20 mg by mouth daily. albuterol (VENTOLIN HFA) 90 mcg/actuation inhaler 4/18/2019 at Unknown time  Yes Yes Sig: Take 2 Puffs by inhalation every four (4) hours as needed for Wheezing. allopurinol (ZYLOPRIM) 100 mg tablet 4/17/2019 at Unknown time  Yes Yes Sig: Take 100 mg by mouth daily. aspirin delayed-release 81 mg tablet 4/17/2019 at Unknown time  Yes Yes Sig: Take 81 mg by mouth daily. atorvastatin (LIPITOR) 40 mg tablet 4/17/2019 at Unknown time  Yes Yes Sig: Take 40 mg by mouth daily. benzonatate (TESSALON PERLES) 100 mg capsule 3/18/2019 at Unknown time  Yes Yes Sig: Take 100 mg by mouth three (3) times daily as needed for Cough. cholecalciferol, vitamin D3, 2,000 unit tab 4/17/2019 at Unknown time  Yes Yes Sig: Take 2,000 Units by mouth daily. dilTIAZem ER (CARDIZEM LA) 120 mg tablet 4/18/2019 at Unknown time  Yes Yes Sig: Take 120 mg by mouth daily. ferrous sulfate 325 mg (65 mg iron) tablet 4/17/2019 at Unknown time  Yes Yes Sig: Take 65 mg by mouth daily. finasteride (PROSCAR) 5 mg tablet 4/17/2019 at Unknown time  Yes Yes Sig: Take 5 mg by mouth daily. levothyroxine (SYNTHROID) 112 mcg tablet 4/17/2019 at Unknown time  Yes Yes Sig: Take 112 mcg by mouth Daily (before breakfast). metoprolol tartrate (LOPRESSOR) 100 mg IR tablet 4/17/2019 at Unknown time  Yes Yes Sig: Take 100 mg by mouth two (2) times a day. phenytoin ER (DILANTIN ER) 100 mg ER capsule 4/17/2019 at Unknown time  Yes Yes Sig: Take 300 mg by mouth nightly. Take 3 (100mg) tablets every day at bedtime  
tamsulosin (FLOMAX) 0.4 mg capsule 4/17/2019 at Unknown time  Yes Yes Sig: Take 0.4 mg by mouth daily. warfarin (COUMADIN) 1 mg tablet 4/17/2019 at Unknown time  Yes Yes Sig: Take 5 mg by mouth daily. Facility-Administered Medications: None Review of Systems: 
CONST: Fever, weight loss, fatigue or chills HEENT: Recent changes in vision, vertigo, epistaxis, dysphagia and hoarseness CV: Chest pain, palpitations, edema and varicosities RESP: Cough, shortness of breath, wheezing, hemoptysis, snoring and reactive airway disease GI: Nausea, vomiting, abdominal pain, change in bowel habits, hematochezia, melena, and GERD  
: Hematuria, dysuria, frequency, urgency, nocturia and stress urinary incontinence MS: Weakness, joint pain and arthritis ENDO: Polyuria, polydipsia, polyphagia, poor wound healing, heat intolerance, cold intolerance LYMPH/HEME: Anemia, bruising and history of blood transfusions INTEG: Dermatitis, abnormal moles NEURO: Dizziness, headache, fainting, seizures and stroke PSYCH: Anxiety and depression A comprehensive review of systems was negative except for that written in the History of Present Illness. 
  
Objective Visit Vitals /69 Pulse (!) 126 Temp 97.7 °F (36.5 °C) Resp 19 Ht 5' 7\" (1.702 m) Wt 81.6 kg (180 lb) SpO2 93% BMI 28.19 kg/m² Physical Exam: 
General Appearance: mild distress, conversant, appears chronically ill, unkempt, smells of tobacco smoke upon entering room, poor dentition HENT: normocephalic/atraumatic, moist mucus membranes Lungs: decreased breath sounds bilaterally, wheeze and crackles at bases, no resp distress, non labored Cardiovascular: IRIR rate uncontrolled no m/r/g, capillary refill < 2 sec, B/L DP/PT pulses +3/4 Abdomen: soft, distended, non-tender, umbilical hernia present, active bowel sounds, no guarding or rigidity Extremities: no cyanosis, no peripheral edema, good perfusion bilaterally Neuro: moves all extremities, no focal deficits, nml tone Psych: appropriate affect, alert and oriented to person, place and time Laboratory Studies: All lab results for the last 24 hours reviewed. Imaging Reviewed: 
[facility] Result Date: 4/18/2019 
--------------------------------------------------------------------------- <<<<<<<<<           [facility]           >>>>>>>>> --------------------------------------------------------------------------- CLINICAL HISTORY:  Wheezing, cough. COMPARISON EXAMINATIONS:  None. ---  SINGLE FRONTAL VIEW OF THE CHEST  --- Mild increase in pulmonary interstitial markings. No evidence of consolidation or pleural fluid. Mild cardiomegaly. Prior sternotomy and cardiac valve replacement. Atherosclerotic arterial calcification. -------------- Impression: -------------- 1. Mild increase in pulmonary interstitial markings, with differential including interstitial pulmonary edema and atypical infection. No evidence of consolidation. 2. Cardiomegaly. EKG:  
Sinus tachycardia Rightward axis Pulmonary disease pattern Nonspecific ST abnormality Abnormal ECG No previous ECGs available Assessment/Plan Active Hospital Problems Diagnosis Date Noted  Acute respiratory distress 04/18/2019 Priority: 1 - One  A-fib ([address] 04/18/2019 Priority: 2 - Two  Elevated brain natriuretic peptide (BNP) level 04/18/2019 Priority: 3 - Three Acute resp distress NC Pulmicort/haseeb butt held secondary to tachycardia Suspect multifactorial components of uncontrolled afib, COPD, right heart strain Zithromax AFIB 
cardizem ggt Cardio consult Elevated BNP Documented hx of HF Echo Diuresis - Cont acceptable home medications for chronic conditions  
- DVT protocol I have personally reviewed all pertinent labs, films and EKGs that have officially resulted. I reviewed available electronic documentation outlining the initial presentation as well as the emergency room physician's encounter. Jasbir Jaramillo PA-C Internal Medicine, Hospitalist 
Pager: [phone] [facility]

## 2019-04-18 NOTE — H&P
ERROR IN COMPUTER STATING PROGRESS NOTE FOR H&P. PLEASE REFER TO THIS AS H&P Attestation signed by Darlin Meza DO at 04/18/19 [id number] I have seen patient independently and I have discussed management with Jasbir Jaramillo, Physician's Assistant. I have personally reviewed all pertinent labs and films that have officially resulted over the last 24 hours. I have personally checked for all pending labs that are awaiting final results. We are working on the plan collaboratively and I agree with the progress note written by CARMELO Webber [address] Problems  
  Diagnosis Date Noted  Acute respiratory distress 04/18/2019  Elevated brain natriuretic peptide (BNP) level 04/18/2019  A-fib ([facility] [address] 04/18/2019  
  
  
Estrella Silverio DO Internal Medicine, Hospitalist 
Pager: [phone] [facility] Internal Medicine History and Physical 
   
  
  
Subjective  
  
HPI: Jorden Hassan is a 69 y.o. male with a PMHx of COPD, MI, Afib, and Per ED notes (HF, valve replacement), who presented to the ED with one week of worsening shortness of breath, and a productive cough. Recently saw his PCP approx 2 days ago and was given steroids. Patient did not improve and came to the ED. He is a chronic smoker, denies chest pains, palpations, edema, weight gain. He appears sicker than he is stating with his minimal complaints of only SOB and cough 
  
In the ED he was found to be in afib, cardizem ggt started, elevated BNP, he was transferred to step-down, cardiology consulted. He will be admitted for further eval 
  
  
PMHx: 
    
Past Medical History:  
Diagnosis Date  Chronic obstructive pulmonary disease (HCC)    
 MI (myocardial infarction) ([facility] [address]    
  
  
PSurgHx: 
     
Past Surgical History:  
Procedure Laterality Date  CARDIAC SURG PROCEDURE UNLIST      
  
  
SocialHx: 
Social History  
  
     
Socioeconomic History  Marital status:   
 Refill Requested:    lisdexamfetamine (VYVANSE) 50 MG capsule *PATIENT STATES TO BE INCREASED TO 60 MG AT THIS REFILL  Take 1 capsule by mouth every morning.    LF: 11/6/17        # 28       R-0  LOV: 11/21/17    PDMP printed for review. Last dispensed 11/8/17. Patient is due. Please advise. MILAD/SILVIA. Prepped if agreed.          Spouse name: Not on file  Number of children: Not on file  Years of education: Not on file  Highest education level: Not on file Occupational History  Not on file Social Needs  Financial resource strain: Not on file  Food insecurity:  
    Worry: Not on file  
    Inability: Not on file  Transportation needs:  
    Medical: Not on file  
    Non-medical: Not on file Tobacco Use  Smoking status: Current Every Day Smoker  Smokeless tobacco: Never Used Substance and Sexual Activity  Alcohol use: Not on file  Drug use: Not on file  Sexual activity: Not on file Lifestyle  Physical activity:  
    Days per week: Not on file  
    Minutes per session: Not on file  Stress: Not on file Relationships  Social connections:  
    Talks on phone: Not on file  
    Gets together: Not on file  
    Attends Mosque service: Not on file  
    Active member of club or organization: Not on file  
    Attends meetings of clubs or organizations: Not on file  
    Relationship status: Not on file  Intimate partner violence:  
    Fear of current or ex partner: Not on file  
    Emotionally abused: Not on file  
    Physically abused: Not on file  
    Forced sexual activity: Not on file Other Topics Concern  Not on file Social History Narrative  Not on file  
  
  
Allergies: 
No Known Allergies 
  
FamilyHx: 
No family history on file. 
  
Prior to Admission Medications Prescriptions Last Dose Informant Patient Reported? Taking? Omeprazole delayed release (PRILOSEC D/R) 20 mg tablet 4/17/2019 at Unknown time   Yes Yes Sig: Take 20 mg by mouth daily. albuterol (VENTOLIN HFA) 90 mcg/actuation inhaler 4/18/2019 at Unknown time   Yes Yes Sig: Take 2 Puffs by inhalation every four (4) hours as needed for Wheezing. allopurinol (ZYLOPRIM) 100 mg tablet 4/17/2019 at Unknown time   Yes Yes Sig: Take 100 mg by mouth daily. aspirin delayed-release 81 mg tablet 4/17/2019 at Unknown time   Yes Yes Sig: Take 81 mg by mouth daily. atorvastatin (LIPITOR) 40 mg tablet 4/17/2019 at Unknown time   Yes Yes Sig: Take 40 mg by mouth daily. benzonatate (TESSALON PERLES) 100 mg capsule 3/18/2019 at Unknown time   Yes Yes Sig: Take 100 mg by mouth three (3) times daily as needed for Cough. cholecalciferol, vitamin D3, 2,000 unit tab 4/17/2019 at Unknown time   Yes Yes Sig: Take 2,000 Units by mouth daily. dilTIAZem ER (CARDIZEM LA) 120 mg tablet 4/18/2019 at Unknown time   Yes Yes Sig: Take 120 mg by mouth daily. ferrous sulfate 325 mg (65 mg iron) tablet 4/17/2019 at Unknown time   Yes Yes Sig: Take 65 mg by mouth daily. finasteride (PROSCAR) 5 mg tablet 4/17/2019 at Unknown time   Yes Yes Sig: Take 5 mg by mouth daily. levothyroxine (SYNTHROID) 112 mcg tablet 4/17/2019 at Unknown time   Yes Yes Sig: Take 112 mcg by mouth Daily (before breakfast). metoprolol tartrate (LOPRESSOR) 100 mg IR tablet 4/17/2019 at Unknown time   Yes Yes Sig: Take 100 mg by mouth two (2) times a day. phenytoin ER (DILANTIN ER) 100 mg ER capsule 4/17/2019 at Unknown time   Yes Yes Sig: Take 300 mg by mouth nightly. Take 3 (100mg) tablets every day at bedtime  
tamsulosin (FLOMAX) 0.4 mg capsule 4/17/2019 at Unknown time   Yes Yes Sig: Take 0.4 mg by mouth daily. warfarin (COUMADIN) 1 mg tablet 4/17/2019 at Unknown time   Yes Yes Sig: Take 5 mg by mouth daily. Facility-Administered Medications: None  
  
  
Review of Systems: 
CONST: Fever, weight loss, fatigue or chills HEENT: Recent changes in vision, vertigo, epistaxis, dysphagia and hoarseness CV: Chest pain, palpitations, edema and varicosities RESP: Cough, shortness of breath, wheezing, hemoptysis, snoring and reactive airway disease GI: Nausea, vomiting, abdominal pain, change in bowel habits, hematochezia, melena, and GERD  
 : Hematuria, dysuria, frequency, urgency, nocturia and stress urinary incontinence MS: Weakness, joint pain and arthritis ENDO: Polyuria, polydipsia, polyphagia, poor wound healing, heat intolerance, cold intolerance LYMPH/HEME: Anemia, bruising and history of blood transfusions INTEG: Dermatitis, abnormal moles NEURO: Dizziness, headache, fainting, seizures and stroke PSYCH: Anxiety and depression 
  
  
  
A comprehensive review of systems was negative except for that written in the History of Present Illness. 
  
Objective Visit Vitals /69 Pulse (!) 126 Temp 97.7 °F (36.5 °C) Resp 19 Ht 5' 7\" (1.702 m) Wt 81.6 kg (180 lb) SpO2 93% BMI 28.19 kg/m²  
  
  
Physical Exam: 
General Appearance: mild distress, conversant, appears chronically ill, unkempt, smells of tobacco smoke upon entering room, poor dentition HENT: normocephalic/atraumatic, moist mucus membranes Lungs: decreased breath sounds bilaterally, wheeze and crackles at bases, no resp distress, non labored Cardiovascular: IRIR rate uncontrolled no m/r/g, capillary refill < 2 sec, B/L DP/PT pulses +3/4 Abdomen: soft, distended, non-tender, umbilical hernia present, active bowel sounds, no guarding or rigidity Extremities: no cyanosis, no peripheral edema, good perfusion bilaterally Neuro: moves all extremities, no focal deficits, nml tone Psych: appropriate affect, alert and oriented to person, place and time 
  
Laboratory Studies: All lab results for the last 24 hours reviewed. 
  
Imaging Reviewed: 
Xr Chest Port 
  
Result Date: 4/18/2019 
--------------------------------------------------------------------------- <<<<<<<<<           [facility]           >>>>>>>>> --------------------------------------------------------------------------- CLINICAL HISTORY:  Wheezing, cough. COMPARISON EXAMINATIONS:  None.  ---  SINGLE FRONTAL VIEW OF THE CHEST  --- Mild increase in pulmonary interstitial markings. No evidence of consolidation or pleural fluid. Mild cardiomegaly. Prior sternotomy and cardiac valve replacement. Atherosclerotic arterial calcification. -------------- 
  
Impression: -------------- 1. Mild increase in pulmonary interstitial markings, with differential including interstitial pulmonary edema and atypical infection. No evidence of consolidation. 2. Cardiomegaly. 
  
  
EKG:  
Sinus tachycardia Rightward axis Pulmonary disease pattern Nonspecific ST abnormality Abnormal ECG No previous ECGs available  
  
Assessment/Plan  
  
     
Active Hospital Problems  
  Diagnosis Date Noted  Acute respiratory distress 04/18/2019  
    Priority: 1 - One  A-fib ([address] 04/18/2019  
    Priority: 2 - Two  Elevated brain natriuretic peptide (BNP) level 04/18/2019  
    Priority: 3 - Three  
  
  
Acute resp distress NC Pulmicort/brovana 
duoneb held secondary to tachycardia Suspect multifactorial components of uncontrolled afib, COPD, right heart strain Zithromax 
  
AFIB 
cardizem ggt Cardio consult 
  
Elevated BNP Documented hx of HF Echo Diuresis  
  
- Cont acceptable home medications for chronic conditions  
- DVT protocol 
  
I have personally reviewed all pertinent labs, films and EKGs that have officially resulted. I reviewed available electronic documentation outlining the initial presentation as well as the emergency room physician's encounter. 
  
Teofilo Sandifer, PA-C Internal Medicine, Hospitalist 
Pager: [phone] [facility]

## 2019-04-18 NOTE — PROGRESS NOTES
attempted to complete the initial Spiritual Assessment of the patient in bed 9 of the emergency room and offered Pastoral Care support. Patient does not have any Buddhism/cultural needs that will affect patients preferences in health care. Chaplains will continue to follow and will provide pastoral care on an as needed/requested basis. Yari Junior Board Certified [employer] Spiritual Care Department [phone]

## 2019-04-18 NOTE — ROUTINE PROCESS
Received report from ER - patient with titrating order for cardizem gtt - Gertrude RHODES to check on patient now and confirm admission status.

## 2019-04-18 NOTE — PROGRESS NOTES
Patient Summary Received PT from ER-Remains in RVR with rate of 150 range - Remains SOB with expiratory wheezes & using accessory muscles to breathe - Using urinal to void harpreet urine in small amts.-1700 Talked with cardiology & Lopressor 5mg IV & Digoxin 250mcq  IV given as ordered HR remains 150 range -Solumedrol & duonebs ordered & breathing is slightly better - remains on O2NC at 4L - Bumex 1mg IV given- Did void 230cc before Bumex given  -Remains on Cardizem drip at 20mg/hr - 1830 HR is now 100 to 120 range & gave up date to Cardiology NP - Ate dinner without difficulty-

## 2019-04-19 ENCOUNTER — APPOINTMENT (OUTPATIENT)
Dept: NON INVASIVE DIAGNOSTICS | Age: 74
DRG: 191 | End: 2019-04-19
Attending: PHYSICIAN ASSISTANT
Payer: MEDICARE

## 2019-04-19 ENCOUNTER — APPOINTMENT (OUTPATIENT)
Dept: ULTRASOUND IMAGING | Age: 74
DRG: 191 | End: 2019-04-19
Attending: INTERNAL MEDICINE
Payer: MEDICARE

## 2019-04-19 PROBLEM — N18.9 ACUTE-ON-CHRONIC KIDNEY INJURY (HCC): Status: ACTIVE | Noted: 2019-04-19

## 2019-04-19 PROBLEM — I50.20 SYSTOLIC HEART FAILURE (HCC): Status: ACTIVE | Noted: 2019-04-18

## 2019-04-19 PROBLEM — N17.9 ACUTE-ON-CHRONIC KIDNEY INJURY (HCC): Status: ACTIVE | Noted: 2019-04-19

## 2019-04-19 LAB
ANION GAP SERPL CALC-SCNC: 13 MMOL/L (ref 3–18)
APPEARANCE UR: CLEAR
ATRIAL RATE: 162 BPM
BACTERIA URNS QL MICRO: ABNORMAL /HPF
BILIRUB UR QL: NEGATIVE
BUN SERPL-MCNC: 62 MG/DL (ref 7–18)
BUN/CREAT SERPL: 20 (ref 12–20)
CALCIUM SERPL-MCNC: 8.1 MG/DL (ref 8.5–10.1)
CALCULATED R AXIS, ECG10: 103 DEGREES
CALCULATED T AXIS, ECG11: 110 DEGREES
CHLORIDE SERPL-SCNC: 106 MMOL/L (ref 100–108)
CO2 SERPL-SCNC: 20 MMOL/L (ref 21–32)
COLOR UR: YELLOW
CREAT SERPL-MCNC: 3.03 MG/DL (ref 0.6–1.3)
CREAT UR-MCNC: 88.1 MG/DL (ref 30–125)
DIAGNOSIS, 93000: NORMAL
ECHO AO ROOT DIAM: 3.09 CM
ECHO AV PEAK GRADIENT: 0 MMHG
ECHO AV PEAK VELOCITY: 0 CM/S
ECHO LV INTERNAL DIMENSION DIASTOLIC: 5.53 CM (ref 4.2–5.9)
ECHO LV INTERNAL DIMENSION SYSTOLIC: 3.75 CM
ECHO LV IVSD: 1.18 CM (ref 0.6–1)
ECHO LV MASS 2D: 296.3 G (ref 88–224)
ECHO LV MASS INDEX 2D: 153.2 G/M2 (ref 49–115)
ECHO LV POSTERIOR WALL DIASTOLIC: 1.05 CM (ref 0.6–1)
ECHO LVOT DIAM: 1.95 CM
ECHO LVOT PEAK GRADIENT: 3.5 MMHG
ECHO LVOT PEAK VELOCITY: 94.01 CM/S
ECHO MV AREA PHT: 3.2 CM2
ECHO MV MAX VELOCITY: 227.99 CM/S
ECHO MV MEAN GRADIENT: 7.6 MMHG
ECHO MV PEAK GRADIENT: 20.8 MMHG
ECHO MV PRESSURE HALF TIME (PHT): 67.8 MS
ECHO MV VTI: 35.89 CM
EPITH CASTS URNS QL MICRO: ABNORMAL /LPF (ref 0–5)
ERYTHROCYTE [DISTWIDTH] IN BLOOD BY AUTOMATED COUNT: 16.6 % (ref 11.6–14.5)
GLUCOSE BLD STRIP.AUTO-MCNC: 178 MG/DL (ref 70–110)
GLUCOSE BLD STRIP.AUTO-MCNC: 184 MG/DL (ref 70–110)
GLUCOSE BLD STRIP.AUTO-MCNC: 204 MG/DL (ref 70–110)
GLUCOSE BLD STRIP.AUTO-MCNC: 259 MG/DL (ref 70–110)
GLUCOSE SERPL-MCNC: 206 MG/DL (ref 74–99)
GLUCOSE UR STRIP.AUTO-MCNC: NEGATIVE MG/DL
HCT VFR BLD AUTO: 38.3 % (ref 36–48)
HGB BLD-MCNC: 12.1 G/DL (ref 13–16)
HGB UR QL STRIP: ABNORMAL
HYALINE CASTS URNS QL MICRO: ABNORMAL /LPF (ref 0–2)
INR PPP: 3.2 (ref 0.8–1.2)
KETONES UR QL STRIP.AUTO: NEGATIVE MG/DL
LEUKOCYTE ESTERASE UR QL STRIP.AUTO: NEGATIVE
MCH RBC QN AUTO: 26.8 PG (ref 24–34)
MCHC RBC AUTO-ENTMCNC: 31.6 G/DL (ref 31–37)
MCV RBC AUTO: 84.9 FL (ref 74–97)
MUCOUS THREADS URNS QL MICRO: ABNORMAL /LPF
NITRITE UR QL STRIP.AUTO: NEGATIVE
P-R INTERVAL, ECG05: 128 MS
PH UR STRIP: 5 [PH] (ref 5–8)
PLATELET # BLD AUTO: 209 K/UL (ref 135–420)
PMV BLD AUTO: 10.4 FL (ref 9.2–11.8)
POTASSIUM SERPL-SCNC: 4.1 MMOL/L (ref 3.5–5.5)
PROT UR STRIP-MCNC: 30 MG/DL
PROT UR-MCNC: 97 MG/DL
PROT/CREAT UR-RTO: 1.1
PROTHROMBIN TIME: 33.4 SEC (ref 11.5–15.2)
Q-T INTERVAL, ECG07: 266 MS
QRS DURATION, ECG06: 80 MS
QTC CALCULATION (BEZET), ECG08: 436 MS
RBC # BLD AUTO: 4.51 M/UL (ref 4.7–5.5)
RBC #/AREA URNS HPF: ABNORMAL /HPF (ref 0–5)
SODIUM SERPL-SCNC: 139 MMOL/L (ref 136–145)
SODIUM UR-SCNC: 21 MMOL/L (ref 20–110)
SP GR UR REFRACTOMETRY: 1.02 (ref 1–1.03)
UROBILINOGEN UR QL STRIP.AUTO: 1 EU/DL (ref 0.2–1)
VENTRICULAR RATE, ECG03: 162 BPM
WBC # BLD AUTO: 10.1 K/UL (ref 4.6–13.2)
WBC URNS QL MICRO: ABNORMAL /HPF (ref 0–4)

## 2019-04-19 PROCEDURE — 94761 N-INVAS EAR/PLS OXIMETRY MLT: CPT

## 2019-04-19 PROCEDURE — 77030037878 HC DRSG MEPILEX >48IN BORD MOLN -B

## 2019-04-19 PROCEDURE — 80048 BASIC METABOLIC PNL TOTAL CA: CPT

## 2019-04-19 PROCEDURE — 74011000258 HC RX REV CODE- 258: Performed by: PHYSICIAN ASSISTANT

## 2019-04-19 PROCEDURE — 74011250637 HC RX REV CODE- 250/637: Performed by: PHYSICIAN ASSISTANT

## 2019-04-19 PROCEDURE — 81001 URINALYSIS AUTO W/SCOPE: CPT

## 2019-04-19 PROCEDURE — 74011250636 HC RX REV CODE- 250/636: Performed by: HOSPITALIST

## 2019-04-19 PROCEDURE — 84300 ASSAY OF URINE SODIUM: CPT

## 2019-04-19 PROCEDURE — 74011250636 HC RX REV CODE- 250/636: Performed by: PHYSICIAN ASSISTANT

## 2019-04-19 PROCEDURE — 76775 US EXAM ABDO BACK WALL LIM: CPT

## 2019-04-19 PROCEDURE — 74011636637 HC RX REV CODE- 636/637: Performed by: HOSPITALIST

## 2019-04-19 PROCEDURE — 74011636637 HC RX REV CODE- 636/637: Performed by: PHYSICIAN ASSISTANT

## 2019-04-19 PROCEDURE — 82962 GLUCOSE BLOOD TEST: CPT

## 2019-04-19 PROCEDURE — 74011000250 HC RX REV CODE- 250: Performed by: HOSPITALIST

## 2019-04-19 PROCEDURE — 74011250636 HC RX REV CODE- 250/636

## 2019-04-19 PROCEDURE — 65660000001 HC RM ICU INTERMED STEPDOWN

## 2019-04-19 PROCEDURE — 74011000250 HC RX REV CODE- 250: Performed by: PHYSICIAN ASSISTANT

## 2019-04-19 PROCEDURE — 84156 ASSAY OF PROTEIN URINE: CPT

## 2019-04-19 PROCEDURE — 94640 AIRWAY INHALATION TREATMENT: CPT

## 2019-04-19 PROCEDURE — 93306 TTE W/DOPPLER COMPLETE: CPT

## 2019-04-19 PROCEDURE — 85027 COMPLETE CBC AUTOMATED: CPT

## 2019-04-19 PROCEDURE — 85610 PROTHROMBIN TIME: CPT

## 2019-04-19 PROCEDURE — 36415 COLL VENOUS BLD VENIPUNCTURE: CPT

## 2019-04-19 RX ORDER — METOPROLOL TARTRATE 5 MG/5ML
5 INJECTION INTRAVENOUS ONCE
Status: COMPLETED | OUTPATIENT
Start: 2019-04-19 | End: 2019-04-19

## 2019-04-19 RX ORDER — METOPROLOL TARTRATE 50 MG/1
100 TABLET ORAL EVERY 12 HOURS
Status: DISCONTINUED | OUTPATIENT
Start: 2019-04-19 | End: 2019-04-23 | Stop reason: HOSPADM

## 2019-04-19 RX ORDER — DIGOXIN 0.25 MG/ML
250 INJECTION INTRAMUSCULAR; INTRAVENOUS
Status: COMPLETED | OUTPATIENT
Start: 2019-04-19 | End: 2019-04-19

## 2019-04-19 RX ORDER — INSULIN GLARGINE 100 [IU]/ML
5 INJECTION, SOLUTION SUBCUTANEOUS DAILY
Status: DISCONTINUED | OUTPATIENT
Start: 2019-04-19 | End: 2019-04-23 | Stop reason: HOSPADM

## 2019-04-19 RX ORDER — METOPROLOL TARTRATE 5 MG/5ML
5 INJECTION INTRAVENOUS ONCE
Status: ACTIVE | OUTPATIENT
Start: 2019-04-19 | End: 2019-04-20

## 2019-04-19 RX ORDER — DIGOXIN 0.25 MG/ML
INJECTION INTRAMUSCULAR; INTRAVENOUS
Status: DISPENSED
Start: 2019-04-19 | End: 2019-04-19

## 2019-04-19 RX ORDER — IPRATROPIUM BROMIDE AND ALBUTEROL SULFATE 2.5; .5 MG/3ML; MG/3ML
3 SOLUTION RESPIRATORY (INHALATION)
Status: DISCONTINUED | OUTPATIENT
Start: 2019-04-19 | End: 2019-04-23 | Stop reason: HOSPADM

## 2019-04-19 RX ORDER — DILTIAZEM HYDROCHLORIDE 120 MG/1
120 CAPSULE, COATED, EXTENDED RELEASE ORAL DAILY
Status: DISCONTINUED | OUTPATIENT
Start: 2019-04-19 | End: 2019-04-21

## 2019-04-19 RX ADMIN — ALLOPURINOL 100 MG: 100 TABLET ORAL at 08:25

## 2019-04-19 RX ADMIN — AMIODARONE HYDROCHLORIDE 0.5 MG/MIN: 1.8 INJECTION, SOLUTION INTRAVENOUS at 16:16

## 2019-04-19 RX ADMIN — INSULIN LISPRO 4 UNITS: 100 INJECTION, SOLUTION INTRAVENOUS; SUBCUTANEOUS at 22:00

## 2019-04-19 RX ADMIN — INSULIN LISPRO 6 UNITS: 100 INJECTION, SOLUTION INTRAVENOUS; SUBCUTANEOUS at 12:13

## 2019-04-19 RX ADMIN — METHYLPREDNISOLONE SODIUM SUCCINATE 60 MG: 40 INJECTION, POWDER, FOR SOLUTION INTRAMUSCULAR; INTRAVENOUS at 18:44

## 2019-04-19 RX ADMIN — BUDESONIDE 500 MCG: 0.5 INHALANT RESPIRATORY (INHALATION) at 19:33

## 2019-04-19 RX ADMIN — METOPROLOL TARTRATE 100 MG: 50 TABLET ORAL at 21:51

## 2019-04-19 RX ADMIN — METHYLPREDNISOLONE SODIUM SUCCINATE 60 MG: 40 INJECTION, POWDER, FOR SOLUTION INTRAMUSCULAR; INTRAVENOUS at 12:13

## 2019-04-19 RX ADMIN — PHENYTOIN SODIUM 300 MG: 100 CAPSULE ORAL at 21:51

## 2019-04-19 RX ADMIN — METHYLPREDNISOLONE SODIUM SUCCINATE 60 MG: 40 INJECTION, POWDER, FOR SOLUTION INTRAMUSCULAR; INTRAVENOUS at 07:00

## 2019-04-19 RX ADMIN — ARFORMOTEROL TARTRATE 15 MCG: 15 SOLUTION RESPIRATORY (INHALATION) at 19:33

## 2019-04-19 RX ADMIN — METHYLPREDNISOLONE SODIUM SUCCINATE 60 MG: 40 INJECTION, POWDER, FOR SOLUTION INTRAMUSCULAR; INTRAVENOUS at 00:25

## 2019-04-19 RX ADMIN — ARFORMOTEROL TARTRATE 15 MCG: 15 SOLUTION RESPIRATORY (INHALATION) at 07:09

## 2019-04-19 RX ADMIN — METOPROLOL TARTRATE 5 MG: 5 INJECTION INTRAVENOUS at 09:53

## 2019-04-19 RX ADMIN — DILTIAZEM HYDROCHLORIDE 120 MG: 120 CAPSULE, COATED, EXTENDED RELEASE ORAL at 11:51

## 2019-04-19 RX ADMIN — METOPROLOL TARTRATE 100 MG: 50 TABLET ORAL at 11:51

## 2019-04-19 RX ADMIN — METOPROLOL TARTRATE 5 MG: 5 INJECTION INTRAVENOUS at 12:32

## 2019-04-19 RX ADMIN — AMIODARONE HYDROCHLORIDE 0.5 MG/MIN: 1.8 INJECTION, SOLUTION INTRAVENOUS at 07:44

## 2019-04-19 RX ADMIN — BUDESONIDE 500 MCG: 0.5 INHALANT RESPIRATORY (INHALATION) at 07:09

## 2019-04-19 RX ADMIN — ASPIRIN 81 MG: 81 TABLET, COATED ORAL at 08:25

## 2019-04-19 RX ADMIN — INSULIN GLARGINE 5 UNITS: 100 INJECTION, SOLUTION SUBCUTANEOUS at 17:00

## 2019-04-19 RX ADMIN — SODIUM CHLORIDE 500 MG: 900 INJECTION, SOLUTION INTRAVENOUS at 20:11

## 2019-04-19 RX ADMIN — INSULIN LISPRO 2 UNITS: 100 INJECTION, SOLUTION INTRAVENOUS; SUBCUTANEOUS at 17:00

## 2019-04-19 RX ADMIN — INSULIN LISPRO 2 UNITS: 100 INJECTION, SOLUTION INTRAVENOUS; SUBCUTANEOUS at 08:25

## 2019-04-19 RX ADMIN — AMIODARONE HYDROCHLORIDE 1 MG/MIN: 1.8 INJECTION, SOLUTION INTRAVENOUS at 00:30

## 2019-04-19 RX ADMIN — AMIODARONE HYDROCHLORIDE: 1.8 INJECTION, SOLUTION INTRAVENOUS at 00:07

## 2019-04-19 RX ADMIN — PANTOPRAZOLE SODIUM 40 MG: 40 TABLET, DELAYED RELEASE ORAL at 08:25

## 2019-04-19 RX ADMIN — ATORVASTATIN CALCIUM 40 MG: 40 TABLET, FILM COATED ORAL at 08:25

## 2019-04-19 RX ADMIN — DIGOXIN 250 MCG: 0.25 INJECTION INTRAMUSCULAR; INTRAVENOUS at 11:52

## 2019-04-19 RX ADMIN — AMIODARONE HYDROCHLORIDE 150 MG: 50 INJECTION, SOLUTION INTRAVENOUS at 00:00

## 2019-04-19 RX ADMIN — VITAMIN D, TAB 1000IU (100/BT) 2000 UNITS: 25 TAB at 08:25

## 2019-04-19 RX ADMIN — DILTIAZEM HYDROCHLORIDE 10 MG/HR: 5 INJECTION INTRAVENOUS at 00:15

## 2019-04-19 RX ADMIN — TAMSULOSIN HYDROCHLORIDE 0.4 MG: 0.4 CAPSULE ORAL at 08:25

## 2019-04-19 NOTE — PROGRESS NOTES
Rec'd pt resting in bed awake and oriented x 4. Remains SOB on exertion. Current HR ranges around 120-135. Remains on cardizem 20mg/h 02 n/c at 4 liters. 2200 Pt HR now 150 range. Pt restless, breath sounds coarse, 02 sats 90-92 range at current time. Rec'd dig 125 mics, will continue to monitor. Takes pills without difficulty. Has strong cough but nonproductive at current time. 2300  Update and notified Jason RHODES with cardiology group of pts contiued 's/ afib. New orders rec'd. 0100  Pt HR now 70's range, a fib, and cardizem has been titrated off. Encouraged pt to TCDB when able, pt verblized understanding. 0430  Appears comfortable, sleeping, easily awakened. Current 02 sats 92%/ afib 70's. 0630  HR remains controlled, 70's,  Pt remain orinented x 4, denies pain, 02 4 liters/ sats92%.

## 2019-04-19 NOTE — PROGRESS NOTES
Problem: Falls - Risk of 
Goal: *Absence of Falls Description Document Friedens Bogdan Fall Risk and appropriate interventions in the flowsheet. Outcome: Progressing Towards Goal 
  
Problem: Patient Education: Go to Patient Education Activity Goal: Patient/Family Education Outcome: Progressing Towards Goal 
  
Problem: Pressure Injury - Risk of 
Goal: *Prevention of pressure injury Description Document Nicko Scale and appropriate interventions in the flowsheet. Outcome: Progressing Towards Goal 
  
Problem: Patient Education: Go to Patient Education Activity Goal: Patient/Family Education Outcome: Progressing Towards Goal 
  
Problem: Discharge Planning Goal: *Discharge to safe environment Outcome: Progressing Towards Goal

## 2019-04-19 NOTE — PROGRESS NOTES
0700: Bedside and Verbal shift change report given to James Raygoza (oncoming nurse) by Tyshawn Hinkle (offgoing nurse). Report included the following information SBAR, Kardex, Intake/Output, MAR, Accordion, Recent Results, Cardiac Rhythm , Alarm Parameters  and Quality Measures. 0845: HR was elevated to 160s, Dr. Solis Spearing at bedside and completed Valsalva and carotid rub and was able to get HR back down to 80 
 
0950: HR returned to 160s, lopressor IV push given. 1005: After lopressor given HR returned to 82. \ 
 
1200: HR increased back to the 150s, per verbal order from Dr. Roshan Ryan was given. 1215: Dr. Solis Spearing at bedside, Heart rate continues to be above goal, verbal orders given for Lopressor IV push, if 5 mins after heart rate remains above 120 another dose can be given up to 3 doses. 1230: First dose of lopressor was given, 
0864: Hr has been lower than 120 since administration 1900: Bedside and Verbal shift change report given to Quincy Chavis and Tressa 51) by Cherelle Raygoza and Sandra (offgoing nurse). Report included the following information  SBAR, Kardex, Intake/Output, MAR, Recent Results, Cardiac Rhythm and Quality Measures.

## 2019-04-19 NOTE — PROGRESS NOTES
Internal Medicine Progress Note Patient's Name: Makayla Lau Admit Date: 4/18/2019 Length of Stay: 1 Assessment/Plan Principal Problem: 
  Acute respiratory distress (4/18/2019) Active Problems: 
  Systolic heart failure ([facility] [address] (4/18/2019) A-fib ([facility] [address] (4/18/2019) Acute-on-chronic kidney injury ([facility] [address] (4/19/2019) Acute resp distress 
- O2 stable on 4L NC 
- Pulmicort/brovana 
- PRN duonebs - Zithromax x5 days 
- steroid q6 hours 
  
AFIB 
- cardizem gtt weaned off 
- amiodarone gtt started overnight - Cardio consult - appreciate assistance 
- cont warfarin 
- start PO metoprolol, diltiazem 
  
Systolic HF 
- BNP 6683 
- Echo 4/19: EF 40-45% - Diuresis  
 
Acute on CKD 
- nephrology consult - appreciate assistance - Renal US pending 
- hold bumex 
- monitor BMP 
 
 
- Cont acceptable home medications for chronic conditions  
- DVT protocol I have personally reviewed all pertinent labs and films that have officially resulted over the last 24 hours. I have personally checked for all pending labs that are awaiting final results. Interval History Per H&P, \"Lowell Arana is a 69 y. o. male with a PMHx of COPD, MI, Afib, and Per ED notes (HF, valve replacement), who presented to the ED with one week of worsening shortness of breath, and a productive cough.  Recently saw his PCP approx 2 days ago and was given steroids.  Patient did not improve and came to the ED. [facility] is a chronic smoker, denies chest pains, palpations, edema, weight gain.  He appears sicker than he is stating with his minimal complaints of only SOB and cough 
  
In the ED he was found to be in afib, cardizem ggt started, elevated BNP, he was transferred to step-down, cardiology consulted. [facility] will be admitted for further eval\" Cardizem gtt weaned off overnight, Amiodarone gtt started. PO metoprolol and diltiazem started. Echo results below.  Nephrology consulted for kidney injury. Renal US pending. Bumex held. Subjective Pt s/e @ bedside. No major events overnight. Pt offers no complaints this AM - would like to go home. Denies CP or SOB. Denies abd pain, nvd. Objective Visit Vitals /57 Pulse (!) 103 Temp 97.6 °F (36.4 °C) Resp 26 Ht 5' 7\" (1.702 m) Wt 81.6 kg (180 lb) SpO2 94% BMI 28.19 kg/m² Physical Exam: 
General Appearance: NAD, conversant HENT: normocephalic/atraumatic, moist mucus membranes Neck: No JVD, supple Lungs: diffuse rhonchi and wheezing throughout, with normal respiratory effort CV: tachycardic, irregular rhythm, no m/r/g Abdomen: soft, non-tender, normal bowel sounds Extremities: no cyanosis, no peripheral edema Neuro: No focal deficits, motor/sensory intact Skin: Normal color, intact Intake and Output: 
Current Shift:  04/19 0701 - 04/19 1900 In: 33.4 [I.V.:33.4] Out: - Last three shifts:  04/17 1901 - 04/19 0700 In: 8138 [P.O.:870; I.V.:775] Out: 630 [Urine:630] Lab/Data Reviewed: 
BMP:  
Lab Results Component Value Date/Time  04/19/2019 02:00 AM  
 K 4.1 04/19/2019 02:00 AM  
  04/19/2019 02:00 AM  
 CO2 20 (L) 04/19/2019 02:00 AM  
 AGAP 13 04/19/2019 02:00 AM  
  (H) 04/19/2019 02:00 AM  
 BUN 62 (H) 04/19/2019 02:00 AM  
 CREA 3.03 (H) 04/19/2019 02:00 AM  
 GFRAA 25 (L) 04/19/2019 02:00 AM  
 GFRNA 20 (L) 04/19/2019 02:00 AM  
 
CBC:  
Lab Results Component Value Date/Time WBC 10.1 04/19/2019 02:00 AM  
 HGB 12.1 (L) 04/19/2019 02:00 AM  
 HCT 38.3 04/19/2019 02:00 AM  
  04/19/2019 02:00 AM  
 
 
Imaging Reviewed: 
 
Meera Begin [facility] Result Date: 4/18/2019 
--------------------------------------------------------------------------- <<<<<<<<<           [facility]           >>>>>>>>> --------------------------------------------------------------------------- CLINICAL HISTORY:  Wheezing, cough. COMPARISON EXAMINATIONS:  None.  --- SINGLE FRONTAL VIEW OF THE CHEST  --- Mild increase in pulmonary interstitial markings. No evidence of consolidation or pleural fluid. Mild cardiomegaly. Prior sternotomy and cardiac valve replacement. Atherosclerotic arterial calcification. -------------- Impression: -------------- 1. Mild increase in pulmonary interstitial markings, with differential including interstitial pulmonary edema and atypical infection. No evidence of consolidation. 2. Cardiomegaly. TTE 4/19/19 Interpretation Summary · Left ventricular mildly decreased systolic function. Estimated left ventricular ejection fraction is 41 - 45%. Visually measured ejection fraction. Left ventricular mild concentric hypertrophy. Left ventricular global hypokinesis. · Left atrial cavity size is severely dilated. · Right ventricular cavity size is mildly dilated. Right ventricular global systolic function is reduced. · Right atrial cavity size is mildly dilated. · Mild aortic valve sclerosis with no evidence of reduced excursion. · Mitral valve thickening and repaired with annuloplasty ring. Mild mitral valve stenosis. Mild mitral valve regurgitation. Myocardial Findings Left Ventricle Normal cavity size. Mild concentric hypertrophy observed. There is mildly decreased systolic function. The estimated ejection fraction is 41 - 45%. Visually measured ejection fraction. Global hypokinesis observed. Atrial fibrillation observed. Left Atrium The cavity size is severely dilated. Right Ventricle The cavity size is mildly dilated. Increased wall thickness. . Global systolic function is reduced. Right Atrium The cavity size is mildly dilated. Aortic Valve No stenosis and no regurgitation. Mild aortic valve sclerosis with no evidence of reduced excursion. Mitral Valve Mitral valve thickening and repaired with annuloplasty ring. Leaflet calcification with reduced excursion. Mitral valve peak gradient is 20.8 mmHg. Mitral valve mean gradient is 7.6 mmHg. Mitral valve area is 3.2 cm2. Mild stenosis present. Mild regurgitation. Tricuspid Valve Tricuspid valve not well visualized. Tricuspid regurgitation is inadequate for estimation of right ventricular systolic pressure. Pulmonic Valve Pulmonic valve not well visualized. Aorta Normal aortic root, ascending aortic, and aortic arch. IVC/Hepatic Veins Inferior vena cava not well visualized. Pericardium Normal pericardium and no evidence of pericardial effusion. Medications Reviewed: 
Current Facility-Administered Medications Medication Dose Route Frequency  amiodarone (NEXTERONE) 360 mg in dextrose 200 mL (1.8 mg/mL) infusion  1 mg/min IntraVENous TITRATE  metoprolol (LOPRESSOR) injection 5 mg  5 mg IntraVENous ONCE  
 albuterol-ipratropium (DUO-NEB) 2.5 MG-0.5 MG/3 ML  3 mL Nebulization Q4H PRN  
 dilTIAZem (CARDIZEM) 125 mg in 0.9% sodium chloride 125 mL infusion  0-20 mg/hr IntraVENous TITRATE  glucose chewable tablet 16 g  4 Tab Oral PRN  
 glucagon (GLUCAGEN) injection 1 mg  1 mg IntraMUSCular PRN  
 dextrose (D50) infusion 12.5-25 g  25-50 mL IntraVENous PRN  
 acetaminophen (TYLENOL) tablet 650 mg  650 mg Oral Q4H PRN  
 ondansetron (ZOFRAN ODT) tablet 4 mg  4 mg Oral Q6H PRN  
 arformoterol (BROVANA) neb solution 15 mcg  15 mcg Nebulization BID RT  
 budesonide (PULMICORT) 500 mcg/2 ml nebulizer suspension  500 mcg Nebulization BID RT  
 allopurinol (ZYLOPRIM) tablet 100 mg  100 mg Oral DAILY  aspirin delayed-release tablet 81 mg  81 mg Oral DAILY  atorvastatin (LIPITOR) tablet 40 mg  40 mg Oral DAILY  cholecalciferol (VITAMIN D3) tablet 2,000 Units  2,000 Units Oral DAILY  pantoprazole (PROTONIX) tablet 40 mg  40 mg Oral ACB  phenytoin ER (DILANTIN ER) ER capsule 300 mg  300 mg Oral QHS  tamsulosin (FLOMAX) capsule 0.4 mg  0.4 mg Oral DAILY  azithromycin (ZITHROMAX) 500 mg in 0.9% sodium chloride (MBP/ADV) 250 mL adv  500 mg IntraVENous Q24H  
 methylPREDNISolone (PF) (SOLU-MEDROL) injection 60 mg  60 mg IntraVENous Q6H  
 WARFARIN INFORMATION NOTE (COUMADIN)   Other Rx Dosing/Monitoring  insulin lispro (HUMALOG) injection   SubCUTAneous AC&HS  warfarin (COUMADIN) tablet 5 mg  5 mg Oral DAILY Eugenia Jacob PA-C [facility] Office:  [phone] Pager: [phone]

## 2019-04-19 NOTE — CONSULTS
Consult Note    Assessment:   · KATHE on CKD 3. Etio- reduced renal perfusion in a setting of A.fib with rvr and COPD exacerbation. May have progressed to ischemic atn. · CKD 3-4 presumably due to htn. Exact baseline is unclear. · A.fib with rvr. Rate is better controlled. Card on the case. On ac with coumadin. · HFrEF. Appears fairly euvolemic. · COPD exacerbation. Recommendations:   · Obtain ua, urine Na/creat/prot. · Obtain renal us. · Avoid NSAID's, IV dye. · Avoid fleets enemas due to concern for acute phosphate nephropathy. · Please dose all medications for approximate creatinine clearance  30-15. · Avoid PICC lines on either arm in order to preserve veins for dialysis access creation. Patient is at high risk for eventual progression to dialysis. Thank you. Consult requested by: Alicia Suarez DO    ADMIT DATE: 4/18/2019  CONSULT DATE: April 19, 2019                 Admission diagnosis: Acute respiratory distress   Reason for Nephrology Consultation: kathe on ckd. HPI: Escobar Cason is a 69 y.o. male [facility] with h/o of copd, continues tobacco use, htn, cad with coronary stenting in 2006, mitral valve repair in 2011 and ckd for which he is followed at renal clinic at the [place] [address]. Patient presented to [facility] ED with 2 weeks h/o of worsening dry cough and sob. Inhalers and cough syrup where without much help. In the ED patient was hypoxic and in a.fib with rvr. Patient was admitted to icu. He feels better with steroids, breathing treatments, oxygen. RVR is better controlled with amiodarone drip. Echo is with EF of 41-15%. Patient doesn't know baseline renal numbers. On admission scr was 2.57, today it is 3.03.        Past Medical History:   Diagnosis Date    Chronic obstructive pulmonary disease ([address]     MI (myocardial infarction) ([address]       Past Surgical History:   Procedure Laterality Date    CARDIAC SURG PROCEDURE UNLIST         Social History Socioeconomic History    Marital status:      Spouse name: Not on file    Number of children: Not on file    Years of education: Not on file    Highest education level: Not on file   Occupational History    Not on file   Social Needs    Financial resource strain: Not on file    Food insecurity:     Worry: Not on file     Inability: Not on file    Transportation needs:     Medical: Not on file     Non-medical: Not on file   Tobacco Use    Smoking status: Current Every Day Smoker    Smokeless tobacco: Never Used   Substance and Sexual Activity    Alcohol use: Not on file    Drug use: Not on file    Sexual activity: Not on file   Lifestyle    Physical activity:     Days per week: Not on file     Minutes per session: Not on file    Stress: Not on file   Relationships    Social connections:     Talks on phone: Not on file     Gets together: Not on file     Attends Zoroastrianism service: Not on file     Active member of club or organization: Not on file     Attends meetings of clubs or organizations: Not on file     Relationship status: Not on file    Intimate partner violence:     Fear of current or ex partner: Not on file     Emotionally abused: Not on file     Physically abused: Not on file     Forced sexual activity: Not on file   Other Topics Concern    Not on file   Social History Narrative    Not on file       No family history on file. No Known Allergies     Home Medications:     Medications Prior to Admission   Medication Sig    allopurinol (ZYLOPRIM) 100 mg tablet Take 100 mg by mouth daily.  aspirin delayed-release 81 mg tablet Take 81 mg by mouth daily.  atorvastatin (LIPITOR) 40 mg tablet Take 40 mg by mouth daily.  cholecalciferol, vitamin D3, 2,000 unit tab Take 2,000 Units by mouth daily.  dilTIAZem ER (CARDIZEM LA) 120 mg tablet Take 120 mg by mouth daily.  ferrous sulfate 325 mg (65 mg iron) tablet Take 65 mg by mouth daily.     finasteride (PROSCAR) 5 mg tablet Take 5 mg by mouth daily.  levothyroxine (SYNTHROID) 112 mcg tablet Take 112 mcg by mouth Daily (before breakfast).  metoprolol tartrate (LOPRESSOR) 100 mg IR tablet Take 100 mg by mouth two (2) times a day.  Omeprazole delayed release (PRILOSEC D/R) 20 mg tablet Take 20 mg by mouth daily.  phenytoin ER (DILANTIN ER) 100 mg ER capsule Take 300 mg by mouth nightly. Take 3 (100mg) tablets every day at bedtime    tamsulosin (FLOMAX) 0.4 mg capsule Take 0.4 mg by mouth daily.  benzonatate (TESSALON PERLES) 100 mg capsule Take 100 mg by mouth three (3) times daily as needed for Cough.  albuterol (VENTOLIN HFA) 90 mcg/actuation inhaler Take 2 Puffs by inhalation every four (4) hours as needed for Wheezing.  warfarin (COUMADIN) 1 mg tablet Take 5 mg by mouth daily.        Current Inpatient Medications:     Current Facility-Administered Medications   Medication Dose Route Frequency    amiodarone (NEXTERONE) 360 mg in dextrose 200 mL (1.8 mg/mL) infusion  1 mg/min IntraVENous TITRATE    albuterol-ipratropium (DUO-NEB) 2.5 MG-0.5 MG/3 ML  3 mL Nebulization Q4H PRN    dilTIAZem CD (CARDIZEM CD) capsule 120 mg  120 mg Oral DAILY    metoprolol tartrate (LOPRESSOR) tablet 100 mg  100 mg Oral Q12H    digoxin (LANOXIN) 250 mcg/mL injection        metoprolol (LOPRESSOR) injection 5 mg  5 mg IntraVENous ONCE    glucose chewable tablet 16 g  4 Tab Oral PRN    glucagon (GLUCAGEN) injection 1 mg  1 mg IntraMUSCular PRN    dextrose (D50) infusion 12.5-25 g  25-50 mL IntraVENous PRN    acetaminophen (TYLENOL) tablet 650 mg  650 mg Oral Q4H PRN    ondansetron (ZOFRAN ODT) tablet 4 mg  4 mg Oral Q6H PRN    arformoterol (BROVANA) neb solution 15 mcg  15 mcg Nebulization BID RT    budesonide (PULMICORT) 500 mcg/2 ml nebulizer suspension  500 mcg Nebulization BID RT    allopurinol (ZYLOPRIM) tablet 100 mg  100 mg Oral DAILY    aspirin delayed-release tablet 81 mg  81 mg Oral DAILY    atorvastatin (LIPITOR) tablet 40 mg  40 mg Oral DAILY    cholecalciferol (VITAMIN D3) tablet 2,000 Units  2,000 Units Oral DAILY    pantoprazole (PROTONIX) tablet 40 mg  40 mg Oral ACB    phenytoin ER (DILANTIN ER) ER capsule 300 mg  300 mg Oral QHS    tamsulosin (FLOMAX) capsule 0.4 mg  0.4 mg Oral DAILY    azithromycin (ZITHROMAX) 500 mg in 0.9% sodium chloride (MBP/ADV) 250 mL adv  500 mg IntraVENous Q24H    methylPREDNISolone (PF) (SOLU-MEDROL) injection 60 mg  60 mg IntraVENous Q6H    WARFARIN INFORMATION NOTE (COUMADIN)   Other Rx Dosing/Monitoring    insulin lispro (HUMALOG) injection   SubCUTAneous AC&HS    warfarin (COUMADIN) tablet 5 mg  5 mg Oral DAILY       Review of Systems:   No fever or chills. No sore throat. No hemoptysis. No chest pain. No orthopnea or paroxysmal nocturnal dyspnea. Good appetite. No nausea, vomiting, abdominal pain, melena or hematochezia. No constipation or diarrhea. No dysuria, no gross hematuria of voiding difficulties. No ankle swelling, no joint paints. No muscle aches. No skin changes. No dizziness or lightheadedness. No headaches. Physical Assessment:     Vitals:    04/19/19 1315 04/19/19 1330 04/19/19 1400 04/19/19 1402   BP: 154/78 116/65 115/54    Pulse:   (!) 121 (!) 103   Resp:   22    Temp:       SpO2:  94% 91%    Weight:       Height:         Last 3 Recorded Weights in this Encounter    04/18/19 1154 04/19/19 1051   Weight: 81.6 kg (180 lb) 81.6 kg (180 lb)     Admission weight: Weight: 81.6 kg (180 lb) (04/18/19 1154)      Intake/Output Summary (Last 24 hours) at 4/19/2019 1409  Last data filed at 4/19/2019 1400  Gross per 24 hour   Intake 1573.63 ml   Output 730 ml   Net 843.63 ml       Patient is in no apparent distress but is constantly coughing. HEENT: Head is normocephalic and atraumatic. Pupils are round, equal, reactive to light. Sclerae are anicteric. Oropharynx clear. Poor dentition. Neck: no cervical lymphadenopathy or thyromegaly.    Lungs: diffusely diminished air entry, loud bl exp wheezes and rhonchi. Trachea at the midline. Cardiovascular system: S1, S2, irrregular rate and rhythm. No murmurs, gallops or rubs. No jvd. Carotid upstroke 2 + bilaterally. Abdomen: soft, non tender, non distended. Positive bowel sounds. No hepatosplenomegaly. No abdominal bruits. Extremities: Moderated finger clubbing, no cyanosis or edema. Strong dorsalis pedis pulses. Brisk capillary refill on the toes bilaterally. Integumentary: skin is grossly intact. Neurologic: Alert, oriented time three. Cooperative and appropriate. No gross motor or sensory deficits. Data Review:    Labs: Results:       Chemistry Recent Labs     04/19/19  0200 04/18/19  1000   * 120*    140   K 4.1 3.6    110*   CO2 20* 23   BUN 62* 47*   CREA 3.03* 2.57*   CA 8.1* 8.5   AGAP 13 7   BUCR 20 18   AP  --  127*   TP  --  8.1   ALB  --  3.0*   GLOB  --  5.1*   AGRAT  --  0.6*         CBC w/Diff Recent Labs     04/19/19  0200 04/18/19  1000   WBC 10.1 10.8   RBC 4.51* 4.51*   HGB 12.1* 12.1*   HCT 38.3 38.4    187   GRANS  --  84*   LYMPH  --  8*   EOS  --  0         Iron/Ferritin No results for input(s): IRON in the last 72 hours. No lab exists for component: TIBCCALC   PTH/VIT D No results for input(s): PTH in the last 72 hours.     No lab exists for component: VITD           Jocelyne Mbcride M.D  [facility]  Office [phone]  Pager [phone]    April 19, 2019

## 2019-04-19 NOTE — CONSULTS
[facility] - Consult Note    Consultation request by Dr. Nas Pratt for advice/opinion related to evaluating atrial fibrillation with rapid ventricular response    Date of  Admission: 4/18/2019  8:58 AM   Primary Care Physician:  Michelle Thomson MD     Assessment:     -Pt presented due to wheezing, shortness of breath. Noted history of COPD from hospital/ER visit several years ago but not on ongoing COPD treatment by PCP at [facility].  -Atrial fibrillation with rapid ventricular response. Chronic afib per patient, on Coumadin for anticoagulation.  -Hx valve repair x 2 in 2011  -Hx CAD, s/p stent placement 2006 ([facility])  -Hx CVA 2002  -Hx hyperlipidemia, on Lipitor     Plan:     -Cardizem gtts weaned off overnight. Pt started on IV Amiodarone last night, currently at 0.5 mg/min.  -Has had some intermittent episodes with rates up to ~160's, had resolution with vagal maneuver/carotid massage earlier today.  -Will start home Metoprolol and Diltiazem doses. -Continue Coumadin for anticoagulation.  -Echocardiogram pending.  -Further recommendations to follow based on test results/hospital course. History of Present Illness: This is a 69 y.o. male admitted for Acute respiratory distress [R06.03]  Acute respiratory distress [R06.03]. Patient complains of: Wheezing, shortness of breath    Yossi Escobedo is a 69 y.o. male with PMHx as noted above, who presented to the hospital due to wheezing over the past couple weeks with worsening shortness of breath over the past couple days. He states that he went to see his wife's PCP (his PCP is at [facility]) on 4/16/19 for the wheezing, at which time he was prescribed Albuterol inhaler, Mucinex, and cough syrup. He states he had been using the inhaler every 4 hours for his symptoms. He is not on any maintenance inhalers for his COPD. No fevers or productive coughs. No PND, orthopnea, leg swelling, palpitations, or chest pain.       Cardiac risk factors: smoking/ tobacco exposure, dyslipidemia, diabetes mellitus, Hx CAD s/p stent placement      Review of Symptoms:  Except as stated above include:  Constitutional:  negative  Respiratory:  As per HPI  Cardiovascular:  negative  Gastrointestinal: negative  Genitourinary:  negative  Musculoskeletal:  Negative  Neurological:  Negative  Dermatological:  Negative  Endocrinological: Negative  Psychological:  Negative       Past Medical History:     Past Medical History:   Diagnosis Date    Chronic obstructive [facility] disease [facility]     MI (myocardial infarction) ([facility]          Social History:     Social History     Socioeconomic History    Marital status:      Spouse name: Not on file    Number of children: Not on file    Years of education: Not on file    Highest education level: Not on file   Tobacco Use    Smoking status: Current Every Day Smoker    Smokeless tobacco: Never Used        Family History:   No family history on file.      Medications:   No Known Allergies     Current Facility-Administered Medications   Medication Dose Route Frequency    amiodarone (NEXTERONE) 360 mg in dextrose 200 mL (1.8 mg/mL) infusion  1 mg/min IntraVENous TITRATE    albuterol-ipratropium (DUO-NEB) 2.5 MG-0.5 MG/3 ML  3 mL Nebulization Q4H PRN    dilTIAZem (CARDIZEM) 125 mg in 0.9% sodium chloride 125 mL infusion  0-20 mg/hr IntraVENous TITRATE    glucose chewable tablet 16 g  4 Tab Oral PRN    glucagon (GLUCAGEN) injection 1 mg  1 mg IntraMUSCular PRN    dextrose (D50) infusion 12.5-25 g  25-50 mL IntraVENous PRN    acetaminophen (TYLENOL) tablet 650 mg  650 mg Oral Q4H PRN    ondansetron (ZOFRAN ODT) tablet 4 mg  4 mg Oral Q6H PRN    arformoterol (BROVANA) neb solution 15 mcg  15 mcg Nebulization BID RT    budesonide (PULMICORT) 500 mcg/2 ml nebulizer suspension  500 mcg Nebulization BID RT    allopurinol (ZYLOPRIM) tablet 100 mg  100 mg Oral DAILY    aspirin delayed-release tablet 81 mg  81 mg Oral DAILY    atorvastatin (LIPITOR) tablet 40 mg  40 mg Oral DAILY    cholecalciferol (VITAMIN D3) tablet 2,000 Units  2,000 Units Oral DAILY    pantoprazole (PROTONIX) tablet 40 mg  40 mg Oral ACB    phenytoin ER (DILANTIN ER) ER capsule 300 mg  300 mg Oral QHS    tamsulosin (FLOMAX) capsule 0.4 mg  0.4 mg Oral DAILY    azithromycin (ZITHROMAX) 500 mg in 0.9% sodium chloride (MBP/ADV) 250 mL adv  500 mg IntraVENous Q24H    methylPREDNISolone (PF) (SOLU-MEDROL) injection 60 mg  60 mg IntraVENous Q6H    WARFARIN INFORMATION NOTE (COUMADIN)   Other Rx Dosing/Monitoring    insulin lispro (HUMALOG) injection   SubCUTAneous AC&HS    warfarin (COUMADIN) tablet 5 mg  5 mg Oral DAILY         Physical Exam:     Visit Vitals  /64   Pulse 80   Temp 97.6 °F (36.4 °C)   Resp 19   Ht 5' 7\" (1.702 m)   Wt 180 lb (81.6 kg)   SpO2 91%   BMI 28.19 kg/m²     BP Readings from Last 3 Encounters:   04/19/19 111/64     Pulse Readings from Last 3 Encounters:   04/19/19 80     Wt Readings from Last 3 Encounters:   04/19/19 180 lb (81.6 kg)       General:  alert, cooperative, no distress, appears stated age  Neck:  No JVD  Lungs:  Diffuse wheezing  Heart:  Irregular rhythm  Abdomen:  abdomen is soft without significant tenderness, masses, organomegaly or guarding  Extremities:  Atraumatic, no significant edema  Skin: Warm and dry.    Neuro: alert, oriented x3, affect appropriate, no focal neurological deficits, moves all extremities well, no involuntary movements  Psych: non focal     Data Review:     Recent Labs     04/19/19  0200 04/18/19  1000   WBC 10.1 10.8   HGB 12.1* 12.1*   HCT 38.3 38.4    187     Recent Labs     04/19/19  0200 04/18/19  1000    140   K 4.1 3.6    110*   CO2 20* 23   * 120*   BUN 62* 47*   CREA 3.03* 2.57*   CA 8.1* 8.5   ALB  --  3.0*   SGOT  --  13*   ALT  --  21   INR 3.2* 3.1*       Results for orders placed or performed during the hospital encounter of 04/18/19   EKG, 12 LEAD, INITIAL   Result Value Ref Range    Ventricular Rate 162 BPM    Atrial Rate 162 BPM    P-R Interval 128 ms    QRS Duration 80 ms    Q-T Interval 266 ms    QTC Calculation (Bezet) 436 ms    Calculated R Axis 103 degrees    Calculated T Axis 110 degrees    Diagnosis       Sinus tachycardia  Rightward axis  Pulmonary disease pattern  Nonspecific ST abnormality  Abnormal ECG  No previous ECGs available           Signed By: Melina Marroquin PA-C     April 19, 2019

## 2019-04-19 NOTE — DIABETES MGMT
NUTRITIONAL ASSESSMENT GLYCEMIC CONTROL/ PLAN OF CARE Melony Gave           74 y.o.           4/18/2019 1. COPD exacerbation ([facility] 2. Acute respiratory failure with hypoxia ([facility] 3. Acute on chronic congestive heart failure, unspecified heart failure type ([facility] 4. Atrial fibrillation with rapid ventricular response ([facility] INTERVENTIONS/PLAN:  
1. Suggest 5 units Lantus/day while receiving steroids. 2.  Monitor po intake, labs and weights. ASSESSMENT:  
Nutritional Status:  Pt is 122% ideal weight. Pt appears well nourished and po intake is adequate per I/O's. Nutrition Diagnosis: 
Nutrient-drug interactions due to steroids as evidenced by BG of 178, 259 mg/dL . Altered nutrition related labs due to prediabetes/ CKD as evidenced by A1C of 6.0% and GFR of 20. Overweight due to excess energy intake as evidenced by BMI of 28.2 kg/m2. Diabetes Management: Pt with elevated BG readings, likely due to steroids. Wound benefit from low dose of basal insulin. Recent blood glucose:    
4/19:  178, 259 
4/18:  201, 282 - received 10 units lispro Within target range (non-ICU: <140; ICU<180): [] Yes   [x]  No 
 
Current Insulin regimen: corrective lispro, normal insulin sensitivity ACHS Home medication/insulin regimen: none, no PMHx seen of DM HbA1c: 6.0% - ave BG has been ~ 120 mg/dL over past 3 months Adequate glycemic control PTA:  [x] Yes  [] No 
SUBJECTIVE/OBJECTIVE: Information obtained from:  Chart review; Attempted to see pt x 2 however he was sound asleep each time and did not respond to verbal stimuli. Diet: CHO consistent 1800 calories, cardiac, 2 gram Na 1500 ml FR/d Patient Vitals for the past 100 hrs: 
 % Diet Eaten 04/19/19 1311 100 % 04/19/19 0900 100 % 04/18/19 1800 95 % Medications: [x]                Reviewed Pertinent:  Solu Medrol 60 mg q 6 hours, Lipitor 40 mg/d Most Recent POC Glucose:  
Recent Labs 04/19/19 
0200 04/18/19 [address] * 120* Labs:  
Lab Results Component Value Date/Time Hemoglobin A1c 6.0 (H) 04/18/2019 10:00 AM  
 
Lab Results Component Value Date/Time Sodium 139 04/19/2019 02:00 AM  
 Potassium 4.1 04/19/2019 02:00 AM  
 Chloride 106 04/19/2019 02:00 AM  
 CO2 20 (L) 04/19/2019 02:00 AM  
 Anion gap 13 04/19/2019 02:00 AM  
 Glucose 206 (H) 04/19/2019 02:00 AM  
 BUN 62 (H) 04/19/2019 02:00 AM  
 Creatinine 3.03 (H) 04/19/2019 02:00 AM  
 Calcium 8.1 (L) 04/19/2019 02:00 AM  
 Albumin 3.0 (L) 04/18/2019 10:00 AM  
 
 
Anthropometrics: IBW : 67.1 kg (148 lb), % IBW (Calculated): 121.62 %, BMI (calculated): 28.2 Wt Readings from Last 1 Encounters:  
04/19/19 81.6 kg (180 lb) Ht Readings from Last 1 Encounters:  
04/19/19 5' 7\" (1.702 m) Estimated Nutrition Needs:  1829 Kcals/day, Protein (g): 81 g Fluid (ml): 1500 ml(FR) Based on:   [x]          Actual BW    []          ABW   []            Adjusted BW   
 
Nutrition Diagnoses: see above Nutrition Interventions: 
None at this time Goal:  
Blood glucose will be within target range of  mg/dL by 4/24/19. Weight maintenance (+/- 1-2 kg) by 4/29/19. Nutrition Monitoring and Evaluation   
 
[x]     Monitor po intake on meal rounds 
[x]     Continue inpatient monitoring and intervention 
[]     Other: 
 
 
Nutrition Risk:  []   High     []  Moderate    [x]  Minimal/Uncompromised Nuno Ross RD, CDE Office:  [phone] [place] Pager:  [phone]

## 2019-04-19 NOTE — PROGRESS NOTES
Pharmacy Service: Warfarin Monitoring Daily Warfarin Dose: 5 mg daily Indication: Afib and history of 2 valves repair Goal INR: 2-3 (unless specified) Interacting Medication(s): amiodarone, azithromycin, allopurinol Daily INR ordered: YES Recent labs: 
 
Recent Labs 04/19/19 
0200 04/18/19 
1000 INR 3.2* 3.1* HGB 12.1* 12.1*  
HCT 38.3 38.4 Vitamin K (dose and date): none Recommendation/Plan:  
- warfarin held 4/18 due to elevated INR  
- continue hold warfarin today, and restart when INR is less than 3  
- multiple drug interactions may result in increased INR Thank you, ROYAL Ramirez

## 2019-04-20 PROBLEM — J44.1 ACUTE EXACERBATION OF CHRONIC OBSTRUCTIVE PULMONARY DISEASE (COPD) (HCC): Status: ACTIVE | Noted: 2019-04-20

## 2019-04-20 LAB
ALBUMIN SERPL-MCNC: 2.8 G/DL (ref 3.4–5)
ANION GAP SERPL CALC-SCNC: 10 MMOL/L (ref 3–18)
ANION GAP SERPL CALC-SCNC: 9 MMOL/L (ref 3–18)
BACTERIA SPEC CULT: NORMAL
BUN SERPL-MCNC: 75 MG/DL (ref 7–18)
BUN SERPL-MCNC: 85 MG/DL (ref 7–18)
BUN/CREAT SERPL: 23 (ref 12–20)
BUN/CREAT SERPL: 27 (ref 12–20)
CALCIUM SERPL-MCNC: 8.2 MG/DL (ref 8.5–10.1)
CALCIUM SERPL-MCNC: 8.3 MG/DL (ref 8.5–10.1)
CHLORIDE SERPL-SCNC: 106 MMOL/L (ref 100–108)
CHLORIDE SERPL-SCNC: 107 MMOL/L (ref 100–108)
CO2 SERPL-SCNC: 22 MMOL/L (ref 21–32)
CO2 SERPL-SCNC: 24 MMOL/L (ref 21–32)
CREAT SERPL-MCNC: 3.19 MG/DL (ref 0.6–1.3)
CREAT SERPL-MCNC: 3.33 MG/DL (ref 0.6–1.3)
ERYTHROCYTE [DISTWIDTH] IN BLOOD BY AUTOMATED COUNT: 16.8 % (ref 11.6–14.5)
GLUCOSE BLD STRIP.AUTO-MCNC: 131 MG/DL (ref 70–110)
GLUCOSE BLD STRIP.AUTO-MCNC: 178 MG/DL (ref 70–110)
GLUCOSE BLD STRIP.AUTO-MCNC: 204 MG/DL (ref 70–110)
GLUCOSE BLD STRIP.AUTO-MCNC: 219 MG/DL (ref 70–110)
GLUCOSE SERPL-MCNC: 127 MG/DL (ref 74–99)
GLUCOSE SERPL-MCNC: 152 MG/DL (ref 74–99)
HCT VFR BLD AUTO: 39.5 % (ref 36–48)
HGB BLD-MCNC: 12.3 G/DL (ref 13–16)
INR PPP: 2.8 (ref 0.8–1.2)
MCH RBC QN AUTO: 26.9 PG (ref 24–34)
MCHC RBC AUTO-ENTMCNC: 31.1 G/DL (ref 31–37)
MCV RBC AUTO: 86.4 FL (ref 74–97)
PHOSPHATE SERPL-MCNC: 5.6 MG/DL (ref 2.5–4.9)
PLATELET # BLD AUTO: 219 K/UL (ref 135–420)
PMV BLD AUTO: 9.2 FL (ref 9.2–11.8)
POTASSIUM SERPL-SCNC: 4.3 MMOL/L (ref 3.5–5.5)
POTASSIUM SERPL-SCNC: 4.4 MMOL/L (ref 3.5–5.5)
PROTHROMBIN TIME: 30 SEC (ref 11.5–15.2)
RBC # BLD AUTO: 4.57 M/UL (ref 4.7–5.5)
SERVICE CMNT-IMP: NORMAL
SODIUM SERPL-SCNC: 138 MMOL/L (ref 136–145)
SODIUM SERPL-SCNC: 140 MMOL/L (ref 136–145)
WBC # BLD AUTO: 14.4 K/UL (ref 4.6–13.2)

## 2019-04-20 PROCEDURE — 74011636637 HC RX REV CODE- 636/637: Performed by: HOSPITALIST

## 2019-04-20 PROCEDURE — 74011250637 HC RX REV CODE- 250/637: Performed by: HOSPITALIST

## 2019-04-20 PROCEDURE — 94761 N-INVAS EAR/PLS OXIMETRY MLT: CPT

## 2019-04-20 PROCEDURE — 65660000000 HC RM CCU STEPDOWN

## 2019-04-20 PROCEDURE — 85610 PROTHROMBIN TIME: CPT

## 2019-04-20 PROCEDURE — 74011250636 HC RX REV CODE- 250/636: Performed by: PHYSICIAN ASSISTANT

## 2019-04-20 PROCEDURE — 82962 GLUCOSE BLOOD TEST: CPT

## 2019-04-20 PROCEDURE — 93005 ELECTROCARDIOGRAM TRACING: CPT

## 2019-04-20 PROCEDURE — 74011250637 HC RX REV CODE- 250/637: Performed by: PHYSICIAN ASSISTANT

## 2019-04-20 PROCEDURE — 80069 RENAL FUNCTION PANEL: CPT

## 2019-04-20 PROCEDURE — 85027 COMPLETE CBC AUTOMATED: CPT

## 2019-04-20 PROCEDURE — 80048 BASIC METABOLIC PNL TOTAL CA: CPT

## 2019-04-20 PROCEDURE — 77010033678 HC OXYGEN DAILY

## 2019-04-20 PROCEDURE — 94640 AIRWAY INHALATION TREATMENT: CPT

## 2019-04-20 PROCEDURE — 74011250636 HC RX REV CODE- 250/636: Performed by: INTERNAL MEDICINE

## 2019-04-20 PROCEDURE — 36415 COLL VENOUS BLD VENIPUNCTURE: CPT

## 2019-04-20 PROCEDURE — 74011000250 HC RX REV CODE- 250: Performed by: PHYSICIAN ASSISTANT

## 2019-04-20 PROCEDURE — 74011000250 HC RX REV CODE- 250: Performed by: HOSPITALIST

## 2019-04-20 PROCEDURE — 74011636637 HC RX REV CODE- 636/637: Performed by: PHYSICIAN ASSISTANT

## 2019-04-20 RX ORDER — BENZONATATE 100 MG/1
100 CAPSULE ORAL
Status: DISCONTINUED | OUTPATIENT
Start: 2019-04-20 | End: 2019-04-23 | Stop reason: HOSPADM

## 2019-04-20 RX ORDER — METOPROLOL TARTRATE 5 MG/5ML
5 INJECTION INTRAVENOUS AS NEEDED
Status: DISCONTINUED | OUTPATIENT
Start: 2019-04-20 | End: 2019-04-23 | Stop reason: HOSPADM

## 2019-04-20 RX ORDER — WARFARIN 2.5 MG/1
5 TABLET ORAL
Status: DISCONTINUED | OUTPATIENT
Start: 2019-04-20 | End: 2019-04-20

## 2019-04-20 RX ORDER — GUAIFENESIN 600 MG/1
1200 TABLET, EXTENDED RELEASE ORAL 2 TIMES DAILY
Status: DISCONTINUED | OUTPATIENT
Start: 2019-04-20 | End: 2019-04-23 | Stop reason: HOSPADM

## 2019-04-20 RX ORDER — PANTOPRAZOLE SODIUM 40 MG/1
40 TABLET, DELAYED RELEASE ORAL
Status: DISCONTINUED | OUTPATIENT
Start: 2019-04-21 | End: 2019-04-23 | Stop reason: HOSPADM

## 2019-04-20 RX ORDER — METOPROLOL TARTRATE 5 MG/5ML
5 INJECTION INTRAVENOUS ONCE
Status: DISCONTINUED | OUTPATIENT
Start: 2019-04-20 | End: 2019-04-20

## 2019-04-20 RX ORDER — FUROSEMIDE 10 MG/ML
20 INJECTION INTRAMUSCULAR; INTRAVENOUS ONCE
Status: COMPLETED | OUTPATIENT
Start: 2019-04-20 | End: 2019-04-20

## 2019-04-20 RX ADMIN — WARFARIN SODIUM 5 MG: 5 TABLET ORAL at 17:30

## 2019-04-20 RX ADMIN — SODIUM CHLORIDE 500 MG: 900 INJECTION, SOLUTION INTRAVENOUS at 22:55

## 2019-04-20 RX ADMIN — METOPROLOL TARTRATE 5 MG: 5 INJECTION INTRAVENOUS at 18:38

## 2019-04-20 RX ADMIN — BUDESONIDE 500 MCG: 0.5 INHALANT RESPIRATORY (INHALATION) at 08:28

## 2019-04-20 RX ADMIN — ALLOPURINOL 100 MG: 100 TABLET ORAL at 08:51

## 2019-04-20 RX ADMIN — ARFORMOTEROL TARTRATE 15 MCG: 15 SOLUTION RESPIRATORY (INHALATION) at 08:28

## 2019-04-20 RX ADMIN — INSULIN GLARGINE 5 UNITS: 100 INJECTION, SOLUTION SUBCUTANEOUS at 08:50

## 2019-04-20 RX ADMIN — INSULIN LISPRO 4 UNITS: 100 INJECTION, SOLUTION INTRAVENOUS; SUBCUTANEOUS at 21:30

## 2019-04-20 RX ADMIN — METHYLPREDNISOLONE SODIUM SUCCINATE 60 MG: 40 INJECTION, POWDER, FOR SOLUTION INTRAMUSCULAR; INTRAVENOUS at 21:29

## 2019-04-20 RX ADMIN — TAMSULOSIN HYDROCHLORIDE 0.4 MG: 0.4 CAPSULE ORAL at 08:51

## 2019-04-20 RX ADMIN — AMIODARONE HYDROCHLORIDE 0.5 MG/MIN: 1.8 INJECTION, SOLUTION INTRAVENOUS at 01:57

## 2019-04-20 RX ADMIN — METHYLPREDNISOLONE SODIUM SUCCINATE 60 MG: 40 INJECTION, POWDER, FOR SOLUTION INTRAMUSCULAR; INTRAVENOUS at 13:24

## 2019-04-20 RX ADMIN — INSULIN LISPRO 2 UNITS: 100 INJECTION, SOLUTION INTRAVENOUS; SUBCUTANEOUS at 08:50

## 2019-04-20 RX ADMIN — ATORVASTATIN CALCIUM 40 MG: 40 TABLET, FILM COATED ORAL at 08:50

## 2019-04-20 RX ADMIN — METOPROLOL TARTRATE 100 MG: 50 TABLET ORAL at 21:29

## 2019-04-20 RX ADMIN — METOPROLOL TARTRATE 100 MG: 50 TABLET ORAL at 08:50

## 2019-04-20 RX ADMIN — METHYLPREDNISOLONE SODIUM SUCCINATE 60 MG: 40 INJECTION, POWDER, FOR SOLUTION INTRAMUSCULAR; INTRAVENOUS at 06:22

## 2019-04-20 RX ADMIN — FUROSEMIDE 20 MG: 10 INJECTION, SOLUTION INTRAMUSCULAR; INTRAVENOUS at 17:31

## 2019-04-20 RX ADMIN — PHENYTOIN SODIUM 300 MG: 100 CAPSULE ORAL at 21:29

## 2019-04-20 RX ADMIN — IPRATROPIUM BROMIDE AND ALBUTEROL SULFATE 3 ML: .5; 3 SOLUTION RESPIRATORY (INHALATION) at 17:57

## 2019-04-20 RX ADMIN — ARFORMOTEROL TARTRATE 15 MCG: 15 SOLUTION RESPIRATORY (INHALATION) at 20:29

## 2019-04-20 RX ADMIN — GUAIFENESIN 1200 MG: 600 TABLET, EXTENDED RELEASE ORAL at 17:29

## 2019-04-20 RX ADMIN — BUDESONIDE 500 MCG: 0.5 INHALANT RESPIRATORY (INHALATION) at 20:29

## 2019-04-20 RX ADMIN — PANTOPRAZOLE SODIUM 40 MG: 40 TABLET, DELAYED RELEASE ORAL at 08:50

## 2019-04-20 RX ADMIN — METHYLPREDNISOLONE SODIUM SUCCINATE 60 MG: 40 INJECTION, POWDER, FOR SOLUTION INTRAMUSCULAR; INTRAVENOUS at 01:22

## 2019-04-20 RX ADMIN — VITAMIN D, TAB 1000IU (100/BT) 2000 UNITS: 25 TAB at 08:50

## 2019-04-20 RX ADMIN — GUAIFENESIN 1200 MG: 600 TABLET, EXTENDED RELEASE ORAL at 12:48

## 2019-04-20 RX ADMIN — INSULIN LISPRO 4 UNITS: 100 INJECTION, SOLUTION INTRAVENOUS; SUBCUTANEOUS at 12:48

## 2019-04-20 RX ADMIN — ASPIRIN 81 MG: 81 TABLET, COATED ORAL at 08:50

## 2019-04-20 RX ADMIN — DILTIAZEM HYDROCHLORIDE 120 MG: 120 CAPSULE, COATED, EXTENDED RELEASE ORAL at 08:50

## 2019-04-20 NOTE — ROUTINE PROCESS
Notified by tele of pt HR in 150s. Pt in bed with eyes closed at this time, denies pain or distress. Can give metoprolol in 15 mins. Will continue to monitor. 2350  Discussed pts continued HR of 130 with Dr. Bong Montalvo [facility] gtt ordered 
 
0720  Bedside and Verbal shift change report given to Chilango3 Jailyn Carreon (oncoming nurse) by María Claros (offgoing nurse). Report included the following information SBAR, Kardex, Intake/Output and MAR.

## 2019-04-20 NOTE — PROGRESS NOTES
[facility]  -  Progress Note Patient: Solitario Jones MRN: [id number]  SSN: [id number] YOB: 1945  Age: 69 y.o. Sex: male Admit Date: 4/18/2019 Assessment:  
 
Hospital Problems  Never Reviewed Codes Class Noted POA Acute-on-chronic kidney injury ([facility] [address] ICD-10-CM: N17.9, N18.9 ICD-9-CM: 584.9, 585.9  4/19/2019 Unknown * (Principal) Acute respiratory distress ICD-10-CM: R06.03 
ICD-9-CM: 518.82  4/18/2019 Unknown Systolic heart failure (HCC) ICD-10-CM: I50.20 ICD-9-CM: 428.20  4/18/2019 [facility] ICD-10-CM: I48.91 
ICD-9-CM: 427.31  4/18/2019 Unknown  
   
  
 
-Pt presented due to wheezing, shortness of breath. Noted history of COPD from hospital/ER visit several years ago but not on ongoing COPD treatment by PCP at [facility]. 
-Atrial fibrillation with rapid ventricular response. Chronic afib per patient, on Coumadin for anticoagulation. 
-Hx valve repair x 2 in 2011 
-Hx CAD, s/p stent placement 2006 ([facility]) 
-Hx CVA 2002 
-Hx hyperlipidemia, on Lipitor Plan: He is now back in sinus rhythm. I would continue his home cardiac medication regimen. His most significant issues right now are severe wheezing as well as renal insufficiency. I would not aggressively diurese this patient without evidence of significant CHF. As you know, atrial fibrillation is more commonly induced with COPD exacerbation; also, beta adrenergic inhalers can increase heart rate and agitate rhythm. I would try to maintain his INR between 3-4, with documented atrial fibrillation, valve repair, and prior history of CVA in 2002. From cardiac standpoint, he can be transferred out of the unit. However, his lung status may require further close observation and management from nursing standpoint.   I will defer this to the primary team. 
 
I discussed his status with his daughter over the phone at length, answering all her questions. He needs to discontinue tobacco completely. Subjective: No new complaints. Objective:  
  
Patient Vitals for the past 8 hrs: 
 Temp Pulse Resp BP SpO2  
04/20/19 0828     94 % 04/20/19 0800 97.5 °F (36.4 °C) 91 21 144/71 92 % 04/20/19 0600  92 17 142/73 93 % 04/20/19 0500  92 18 146/71 93 % 04/20/19 0400  92 19  91 % Patient Vitals for the past 96 hrs: 
 Weight 04/19/19 1051 81.6 kg (180 lb) 04/18/19 1154 81.6 kg (180 lb) Intake/Output Summary (Last 24 hours) at 4/20/2019 1025 Last data filed at 4/20/2019 0600 Gross per 24 hour Intake 819 ml Output 850 ml Net -31 ml Physical Exam: 
General:  alert, cooperative, mild distress, appears older than stated age Neck:  no JVD Lungs:  mild retractions, wheezes diffusely throughout Heart:  regular rate and rhythm Abdomen:  no guarding or rigidity Extremities:  no edema Data Review:  
 
Labs: Results:  
   
Chemistry Recent Labs  
  04/20/19 
0536 04/19/19 
0200 04/18/19 
1000 * 206* 120*  139 140  
K 4.3 4.1 3.6  106 110* CO2 22 20* 23 BUN 75* 62* 47* CREA 3.33* 3.03* 2.57* CA 8.2* 8.1* 8.5 PHOS 5.6*  --   --   
AGAP 10 13 7 BUCR 23* 20 18 AP  --   --  127* TP  --   --  8.1 ALB 2.8*  --  3.0*  
GLOB  --   --  5.1* AGRAT  --   --  0.6* CBC w/Diff Recent Labs  
  04/20/19 
0536 04/19/19 
0200 04/18/19 
1000 WBC 14.4* 10.1 10.8 RBC 4.57* 4.51* 4.51* HGB 12.3* 12.1* 12.1*  
HCT 39.5 38.3 38.4  209 187 GRANS  --   --  84* LYMPH  --   --  8*  
EOS  --   --  0 Cardiac Enzymes No results found for: CPK, CK, CKMMB, CKMB, RCK3, CKMBT, CKNDX, CKND1, GLADYS, TROPT, TROIQ, TIKA, TROPT, TNIPOC, BNP, BNPP Coagulation Recent Labs  
  04/20/19 
0536 04/19/19 
0200 PTP 30.0* 33.4* INR 2.8* 3.2* Lipid Panel No results found for: CHOL, CHOLPOCT, CHOLX, [address], CHOLV, [id number], HDL, LDL, LDLC, DLDLP, [id number], VLDLC, VLDL, TGLX, TRIGL, TRIGP, TGLPOCT, CHHD, CHHDX  
BNP No results found for: BNP, BNPP, XBNPT Liver Enzymes Recent Labs  
  04/20/19 
0536 04/18/19 
1000 TP  --  8.1 ALB 2.8* 3.0*  
AP  --  127* SGOT  --  13* Digoxin Thyroid Studies Lab Results Component Value Date/Time  TSH 0.41 04/18/2019 10:00 AM

## 2019-04-20 NOTE — PROGRESS NOTES
2020: Ultrasound called, they currently have several STAT ultrasounds scheduled prior to the patient's routine kidney ultrasound. She may be able to complete tonight, but may also be performed in the morning.

## 2019-04-20 NOTE — PROGRESS NOTES
Receive a call from the telemetry monitoring tech stating that the patient's heart rate is 130 Sinus Tach. Patient has expiratory wheezing and coughing. Paged MD and respiratory therapist. Will continue to monitor. Femi Pineda Respiratory Therapist present at patient's bedside. Patient is receiving treatment.

## 2019-04-20 NOTE — PROGRESS NOTES
Problem: Falls - Risk of 
Goal: *Absence of Falls Description Document Edgar Brunner Fall Risk and appropriate interventions in the flowsheet. Outcome: Progressing Towards Goal 
  
Problem: Patient Education: Go to Patient Education Activity Goal: Patient/Family Education Outcome: Progressing Towards Goal 
  
Problem: Pressure Injury - Risk of 
Goal: *Prevention of pressure injury Description Document Nicko Scale and appropriate interventions in the flowsheet. Outcome: Progressing Towards Goal 
  
Problem: Patient Education: Go to Patient Education Activity Goal: Patient/Family Education Outcome: Progressing Towards Goal 
  
Problem: Discharge Planning Goal: *Discharge to safe environment Outcome: Progressing Towards Goal 
  
Problem: Chronic Obstructive Pulmonary Disease (COPD) Goal: *Oxygen saturation during activity within specified parameters Outcome: Progressing Towards Goal 
Goal: *Able to remain out of bed as prescribed Outcome: Progressing Towards Goal 
Goal: *Absence of hypoxia Outcome: Progressing Towards Goal 
Goal: *Optimize nutritional status Outcome: Progressing Towards Goal 
  
Problem: Nutrition Deficit Goal: *Optimize nutritional status Outcome: Progressing Towards Goal

## 2019-04-20 NOTE — PROGRESS NOTES
0700:Bedside and Verbal shift change report given to Chioma Rubio and [facility] (oncoming nurse) by Alba Tran (offgoing nurse). Report included the following information  SBAR, Procedure Summary, Intake/Output, MAR, Recent Results, Cardiac Rhythm and Quality Measures. 0900: Cardiology rounded, orders for 12 lead EKG and give last nights dose of coumadin 1330: Pt transferred to [facility], report was given to Rodríguez Campbell

## 2019-04-20 NOTE — PROGRESS NOTES
Problem: Falls - Risk of 
Goal: *Absence of Falls Description Document Gustavo Persaud Fall Risk and appropriate interventions in the flowsheet. Outcome: Progressing Towards Goal 
  
Problem: Patient Education: Go to Patient Education Activity Goal: Patient/Family Education Outcome: Progressing Towards Goal 
  
Problem: Pressure Injury - Risk of 
Goal: *Prevention of pressure injury Description Document Nicko Scale and appropriate interventions in the flowsheet. Outcome: Progressing Towards Goal 
  
Problem: Patient Education: Go to Patient Education Activity Goal: Patient/Family Education Outcome: Progressing Towards Goal 
  
Problem: Discharge Planning Goal: *Discharge to safe environment Outcome: Progressing Towards Goal 
  
Problem: Chronic Obstructive Pulmonary Disease (COPD) Goal: *Oxygen saturation during activity within specified parameters Outcome: Progressing Towards Goal 
Goal: *Able to remain out of bed as prescribed Outcome: Progressing Towards Goal 
Goal: *Absence of hypoxia Outcome: Progressing Towards Goal 
Goal: *Optimize nutritional status Outcome: Progressing Towards Goal 
  
Problem: Nutrition Deficit Goal: *Optimize nutritional status Outcome: Progressing Towards Goal

## 2019-04-20 NOTE — PROGRESS NOTES
In Patient Progress note Admit Date: 4/18/2019 Impression: 1. Oliguric KATHE on CKD 3. Etio- reduced renal perfusion in a setting of A.fib with rvr and COPD exacerbation. 2. CKD 3-4 presumably due to htn. Exact baseline is unclear. 3. A.fib with rvr. Rate is better controlled. Card on the case. On ac with coumadin. 4. HFrEF. Appears fairly euvolemic. 5. COPD exacerbation. Overall hemodynamically stable, nonoliguric and slightly improving urine output. Rate of rise of creatinine seems to be slowing. There is no indication for renal replacement therapy. Will give her a trial dose of furosemide 20 mg IV x1 and see the response. Goal is to augment his urine output to convert oliguric --> nonoliguric KATHE ,intravascular volume does seem generous and patient  
does have a EF of 40-45% with global right-sided systolic function decreased although no overt indications of failure. Agree with cardiology Please call with questions, 
 
Lexx Jerez MD FASN Cell [phone] Pager: [phone] Subjective: Audible wheezing Current Facility-Administered Medications:  
  guaiFENesin ER (MUCINEX) tablet 1,200 mg, 1,200 mg, Oral, BID, Carla Batista H DO, 1,200 mg at 04/20/19 1248   furosemide (LASIX) injection 20 mg, 20 mg, IntraVENous, ONCE, [facility] Southwest[facility]if MD 
  [START ON 4/21/2019] pantoprazole (PROTONIX) tablet 40 mg, 40 mg, Oral, ACB, Zen Gonzalez DO 
  amiodarone (NEXTERONE) 360 mg in dextrose 200 mL (1.8 mg/mL) infusion, 1 mg/min, IntraVENous, TITRATE, Anna Neal DO, Stopped at 04/20/19 1140 
  albuterol-ipratropium (DUO-NEB) 2.5 MG-0.5 MG/3 ML, 3 mL, Nebulization, Q4H PRN, Anna Neal DO 
  dilTIAZem CD (CARDIZEM CD) capsule 120 mg, 120 mg, Oral, DAILY, Dominique Lora PA-C, 120 mg at 04/20/19 0420   metoprolol tartrate (LOPRESSOR) tablet 100 mg, 100 mg, Oral, Q12H, Dominique Lora, NOAH, 100 mg at 04/20/19 5055   insulin glargine (LANTUS) injection 5 Units, 5 Units, SubCUTAneous, DAILY, Elyse Yadav PA 5 Units at 04/20/19 0850 
  glucose chewable tablet 16 g, 4 Tab, Oral, PRN, Jose Black PA-C 
  glucagon (GLUCAGEN) injection 1 mg, 1 mg, IntraMUSCular, PRN, Jose Black PA-C 
  dextrose (D50) infusion 12.5-25 g, 25-50 mL, IntraVENous, PRN, Jose Black PA-C 
  acetaminophen (TYLENOL) tablet 650 mg, 650 mg, Oral, Q4H PRN, Jose Black PA-C 
  ondansetron (ZOFRAN ODT) tablet 4 mg, 4 mg, Oral, Q6H PRN, Jose Black PA-C 
  arformoterol (BROVANA) neb solution 15 mcg, 15 mcg, Nebulization, BID RT, Franci Judge PA-C, 15 mcg at 04/20/19 6799   budesonide (PULMICORT) 500 mcg/2 ml nebulizer suspension, 500 mcg, Nebulization, BID RT, Jose Black PA-C, 500 mcg at 04/20/19 8605   allopurinol (ZYLOPRIM) tablet 100 mg, 100 mg, Oral, DAILY, Jose Black PA-C, 100 mg at 04/20/19 2622   aspirin delayed-release tablet 81 mg, 81 mg, Oral, DAILY, Franci Judge PA-C, 81 mg at 04/20/19 8358   atorvastatin (LIPITOR) tablet 40 mg, 40 mg, Oral, DAILY, Jose Black PA-C, 40 mg at 04/20/19 5928   cholecalciferol (VITAMIN D3) tablet 2,000 Units, 2,000 Units, Oral, DAILY, Franci Judge PA-C, 2,000 Units at 04/20/19 9517   phenytoin ER (DILANTIN ER) ER capsule 300 mg, 300 mg, Oral, QHS, Jose Black PA-C, 300 mg at 04/19/19 2151   tamsulosin (FLOMAX) capsule 0.4 mg, 0.4 mg, Oral, DAILY, Jose Black PA-C, 0.4 mg at 04/20/19 0851 
  azithromycin (ZITHROMAX) 500 mg in 0.9% sodium chloride (MBP/ADV) 250 mL adv, 500 mg, IntraVENous, Q24H, Jose Black PA-C, Stopped at 04/19/19 2120 
  methylPREDNISolone (PF) (SOLU-MEDROL) injection 60 mg, 60 mg, IntraVENous, Q6H, Jose Black PA-C, 60 mg at 04/20/19 1324   WARFARIN INFORMATION NOTE (COUMADIN), , Other, Rx Dosing/Monitoring, Bentley Willis PA-C 
   insulin lispro (HUMALOG) injection, , SubCUTAneous, AC&HS, Kiara Jackson DO, 4 Units at 04/20/19 1248   warfarin (COUMADIN) tablet 5 mg, 5 mg, Oral, DAILY, Kiara Jackson DO, Stopped at 04/19/19 1800 Objective:  
 
Visit Vitals /73 (BP 1 Location: Right arm, BP Patient Position: Head of bed elevated (Comment degrees)) Pulse 94 Temp 97.5 °F (36.4 °C) Resp 20 Ht 5' 7\" (1.702 m) Wt 83.9 kg (185 lb) SpO2 95% BMI 28.98 kg/m² Intake/Output Summary (Last 24 hours) at 4/20/2019 1712 Last data filed at 4/20/2019 1319 Gross per 24 hour Intake 1015.03 ml Output 1000 ml Net 15.03 ml Physical Exam:  
 
GEN NAD HENT dry mucosa CVS s1 s2 wnl no JVD 
GI soft BS + Ext no edema GI soft BS + Data Review: 
 
Recent Labs  
  04/20/19 
0536 WBC 14.4*  
RBC 4.57* HCT 39.5 MCV 86.4 MCH 26.9 MCHC 31.1 RDW 16.8* Recent Labs  
  04/20/19 
1539 04/20/19 
0536 04/19/19 
0200 04/18/19 
1000 BUN 85* 75* 62* 47* CREA 3.19* 3.33* 3.03* 2.57* CA 8.3* 8.2* 8.1* 8.5 ALB  --  2.8*  --  3.0*  
K 4.4 4.3 4.1 3.6  138 139 140  106 106 110* CO2 24 22 20* 23 PHOS  --  5.6*  --   --   
* 152* 206* 120* Jackeline Love MD

## 2019-04-20 NOTE — ROUTINE PROCESS
1915- Bedside shift change report given to Sanjay Thomas RN and [address] [place] [address],[address] (oncoming nurse) by Nas Barrera RN and Estelita Rogers RN (offgoing nurse). Report included the following information SBAR. Kardex, MAR .  
2236- Ultrasound tech at patients bedside to perform- Renal ultrasound 1905-Bedside shift change report given to Yesi LOUIE (oncoming nurse) by Tommie Rice  (offgoing nurse). Report included the following information SBAR, Kardex, Procedure Summary, Intake/Output, MAR, Recent Results, Med Rec Status and Alarm Parameters .

## 2019-04-20 NOTE — PROGRESS NOTES
Internal Medicine Progress Note Patient's Name: Karsten Mayorga Admit Date: 4/18/2019 Length of Stay: 2 Assessment/Plan Principal Problem: 
  Acute exacerbation of chronic obstructive pulmonary disease (COPD) ([facility] (4/20/2019) Active Problems: 
  A-fib ([facility] (4/18/2019) Systolic heart failure ([facility] (4/18/2019) Acute-on-chronic kidney injury ([facility] (4/19/2019) Acute respiratory distress (4/18/2019) Acute resp distress/COPD exac 
- O2 stable on 4L NC 
- Pulmicort/brovana 
- PRN duonebs - Zithromax x5 days 
- steroid q6 hours 
  
AFIB 
- Cardio consult - appreciate assistance 
- cont warfarin, keep INR 3-4 per cardiology 
- start PO metoprolol, diltiazem 
  
Systolic HF 
- BNP 9801 
- Echo 4/19: EF 40-45% Acute on CKD 
- nephrology consult - appreciate assistance - Renal US pending 
- monitor BMP 
 
 
- Cont acceptable home medications for chronic conditions  
- DVT protocol I have personally reviewed all pertinent labs and films that have officially resulted over the last 24 hours. I have personally checked for all pending labs that are awaiting final results. Interval History Per H&P, \"Lowell Arana is a 69 y. o. male with a PMHx of COPD, MI, Afib, and Per ED notes (HF, valve replacement), who presented to the ED with one week of worsening shortness of breath, and a productive cough.  Recently saw his PCP approx 2 days ago and was given steroids.  Patient did not improve and came to the ED. Ni Garcia is a chronic smoker, denies chest pains, palpations, edema, weight gain.  He appears sicker than he is stating with his minimal complaints of only SOB and cough 
  
In the ED he was found to be in afib, cardizem ggt started, elevated BNP, he was transferred to step-down, cardiology consulted. Ni Garcia will be admitted for further eval\" Cardizem gtt weaned off overnight, Amiodarone gtt started.  PO metoprolol and diltiazem started. Echo results below. Nephrology consulted for kidney injury. Renal US pending. Transferred to floor 4/20. Subjective Pt s/e @ bedside. No major events overnight. Pt offers no complaints this AM - would like to go home. Denies CP or SOB. Denies abd pain, nvd. Objective Visit Vitals /71 Pulse 91 Temp 97.5 °F (36.4 °C) Resp 21 Ht 5' 7\" (1.702 m) Wt 81.6 kg (180 lb) SpO2 94% BMI 28.19 kg/m² Physical Exam: 
General Appearance: NAD, conversant HENT: normocephalic/atraumatic, moist mucus membranes Neck: No JVD, supple Lungs: diffuse wheezing throughout, with normal respiratory effort CV: RRR, no m/r/g Abdomen: soft, non-tender, normal bowel sounds Extremities: no cyanosis, no peripheral edema Neuro: No focal deficits, motor/sensory intact Skin: Normal color, intact Intake and Output: 
Current Shift:  No intake/output data recorded. Last three shifts:  04/18 1901 - 04/20 0700 In: 1939 [P.O.:660; I.V.:1279] Out: 1250 [[id number]] Lab/Data Reviewed: 
BMP:  
Lab Results Component Value Date/Time  04/20/2019 05:36 AM  
 K 4.3 04/20/2019 05:36 AM  
  04/20/2019 05:36 AM  
 CO2 22 04/20/2019 05:36 AM  
 AGAP 10 04/20/2019 05:36 AM  
  (H) 04/20/2019 05:36 AM  
 BUN 75 (H) 04/20/2019 05:36 AM  
 CREA 3.33 (H) 04/20/2019 05:36 AM  
 GFRAA 22 (L) 04/20/2019 05:36 AM  
 GFRNA 18 (L) 04/20/2019 05:36 AM  
 
CBC:  
Lab Results Component Value Date/Time WBC 14.4 (H) 04/20/2019 05:36 AM  
 HGB 12.3 (L) 04/20/2019 05:36 AM  
 HCT 39.5 04/20/2019 05:36 AM  
  04/20/2019 05:36 AM  
 
 
Imaging Reviewed: 
 
No results found. TTE 4/19/19 Interpretation Summary · Left ventricular mildly decreased systolic function. Estimated left ventricular ejection fraction is 41 - 45%. Visually measured ejection fraction. Left ventricular mild concentric hypertrophy. Left ventricular global hypokinesis. · Left atrial cavity size is severely dilated. · Right ventricular cavity size is mildly dilated. Right ventricular global systolic function is reduced. · Right atrial cavity size is mildly dilated. · Mild aortic valve sclerosis with no evidence of reduced excursion. · Mitral valve thickening and repaired with annuloplasty ring. Mild mitral valve stenosis. Mild mitral valve regurgitation. Myocardial Findings Left Ventricle Normal cavity size. Mild concentric hypertrophy observed. There is mildly decreased systolic function. The estimated ejection fraction is 41 - 45%. Visually measured ejection fraction. Global hypokinesis observed. Atrial fibrillation observed. Left Atrium The cavity size is severely dilated. Right Ventricle The cavity size is mildly dilated. Increased wall thickness. . Global systolic function is reduced. Right Atrium The cavity size is mildly dilated. Aortic Valve No stenosis and no regurgitation. Mild aortic valve sclerosis with no evidence of reduced excursion. Mitral Valve Mitral valve thickening and repaired with annuloplasty ring. Leaflet calcification with reduced excursion. Mitral valve peak gradient is 20.8 mmHg. Mitral valve mean gradient is 7.6 mmHg. Mitral valve area is 3.2 cm2. Mild stenosis present. Mild regurgitation. Tricuspid Valve Tricuspid valve not well visualized. Tricuspid regurgitation is inadequate for estimation of right ventricular systolic pressure. Pulmonic Valve Pulmonic valve not well visualized. Aorta Normal aortic root, ascending aortic, and aortic arch. IVC/Hepatic Veins Inferior vena cava not well visualized. Pericardium Normal pericardium and no evidence of pericardial effusion. Medications Reviewed: 
Current Facility-Administered Medications Medication Dose Route Frequency  amiodarone (NEXTERONE) 360 mg in dextrose 200 mL (1.8 mg/mL) infusion  1 mg/min IntraVENous TITRATE  albuterol-ipratropium (DUO-NEB) 2.5 MG-0.5 MG/3 ML  3 mL Nebulization Q4H PRN  
 dilTIAZem CD (CARDIZEM CD) capsule 120 mg  120 mg Oral DAILY  metoprolol tartrate (LOPRESSOR) tablet 100 mg  100 mg Oral Q12H  
 insulin glargine (LANTUS) injection 5 Units  5 Units SubCUTAneous DAILY  glucose chewable tablet 16 g  4 Tab Oral PRN  
 glucagon (GLUCAGEN) injection 1 mg  1 mg IntraMUSCular PRN  
 dextrose (D50) infusion 12.5-25 g  25-50 mL IntraVENous PRN  
 acetaminophen (TYLENOL) tablet 650 mg  650 mg Oral Q4H PRN  
 ondansetron (ZOFRAN ODT) tablet 4 mg  4 mg Oral Q6H PRN  
 arformoterol (BROVANA) neb solution 15 mcg  15 mcg Nebulization BID RT  
 budesonide (PULMICORT) 500 mcg/2 ml nebulizer suspension  500 mcg Nebulization BID RT  
 allopurinol (ZYLOPRIM) tablet 100 mg  100 mg Oral DAILY  aspirin delayed-release tablet 81 mg  81 mg Oral DAILY  atorvastatin (LIPITOR) tablet 40 mg  40 mg Oral DAILY  cholecalciferol (VITAMIN D3) tablet 2,000 Units  2,000 Units Oral DAILY  pantoprazole (PROTONIX) tablet 40 mg  40 mg Oral ACB  phenytoin ER (DILANTIN ER) ER capsule 300 mg  300 mg Oral QHS  tamsulosin (FLOMAX) capsule 0.4 mg  0.4 mg Oral DAILY  azithromycin (ZITHROMAX) 500 mg in 0.9% sodium chloride (MBP/ADV) 250 mL adv  500 mg IntraVENous Q24H  
 methylPREDNISolone (PF) (SOLU-MEDROL) injection 60 mg  60 mg IntraVENous Q6H  
 WARFARIN INFORMATION NOTE (COUMADIN)   Other Rx Dosing/Monitoring  insulin lispro (HUMALOG) injection   SubCUTAneous AC&HS  warfarin (COUMADIN) tablet 5 mg  5 mg Oral DAILY Patel Leonard PA-C [address] [place], [place] [address]ty [facility] Office:  [phone] Pager: [phone]

## 2019-04-20 NOTE — PROGRESS NOTES
0828--Recd pt on 5 lpm NC - pt has wheezing  
--administer neb 
 
0850--Called back to bedside pt wheezing - sats 88-92% - pt moving around & coughing - offered PRN DuoNeb - pt refused stated he was just uncomfortable - discussed with RN & we will continue to monitor & if PRN neb needed will give 1750--Called to bedside for pt due to wheezing--administer PRN Neb--Pt has elevated HR -- cardizem drip dc earlier in day--pt sats 100% following Neb but HR remains elevated - RN aware & she has paged MD

## 2019-04-21 LAB
ALBUMIN SERPL-MCNC: 2.7 G/DL (ref 3.4–5)
ANION GAP SERPL CALC-SCNC: 11 MMOL/L (ref 3–18)
ATRIAL RATE: 91 BPM
BUN SERPL-MCNC: 95 MG/DL (ref 7–18)
BUN/CREAT SERPL: 31 (ref 12–20)
CALCIUM SERPL-MCNC: 8.3 MG/DL (ref 8.5–10.1)
CALCULATED P AXIS, ECG09: 95 DEGREES
CALCULATED R AXIS, ECG10: 85 DEGREES
CALCULATED T AXIS, ECG11: -65 DEGREES
CHLORIDE SERPL-SCNC: 110 MMOL/L (ref 100–108)
CO2 SERPL-SCNC: 22 MMOL/L (ref 21–32)
CREAT SERPL-MCNC: 3.1 MG/DL (ref 0.6–1.3)
DIAGNOSIS, 93000: NORMAL
ERYTHROCYTE [DISTWIDTH] IN BLOOD BY AUTOMATED COUNT: 16.8 % (ref 11.6–14.5)
GLUCOSE BLD STRIP.AUTO-MCNC: 169 MG/DL (ref 70–110)
GLUCOSE BLD STRIP.AUTO-MCNC: 184 MG/DL (ref 70–110)
GLUCOSE BLD STRIP.AUTO-MCNC: 185 MG/DL (ref 70–110)
GLUCOSE BLD STRIP.AUTO-MCNC: 205 MG/DL (ref 70–110)
GLUCOSE SERPL-MCNC: 160 MG/DL (ref 74–99)
HCT VFR BLD AUTO: 39.2 % (ref 36–48)
HGB BLD-MCNC: 11.9 G/DL (ref 13–16)
INR PPP: 2.9 (ref 0.8–1.2)
MCH RBC QN AUTO: 26.3 PG (ref 24–34)
MCHC RBC AUTO-ENTMCNC: 30.4 G/DL (ref 31–37)
MCV RBC AUTO: 86.7 FL (ref 74–97)
P-R INTERVAL, ECG05: 200 MS
PHOSPHATE SERPL-MCNC: 4.8 MG/DL (ref 2.5–4.9)
PLATELET # BLD AUTO: 215 K/UL (ref 135–420)
PMV BLD AUTO: 9.6 FL (ref 9.2–11.8)
POTASSIUM SERPL-SCNC: 4.3 MMOL/L (ref 3.5–5.5)
PROTHROMBIN TIME: 30.8 SEC (ref 11.5–15.2)
Q-T INTERVAL, ECG07: 364 MS
QRS DURATION, ECG06: 98 MS
QTC CALCULATION (BEZET), ECG08: 447 MS
RBC # BLD AUTO: 4.52 M/UL (ref 4.7–5.5)
SODIUM SERPL-SCNC: 143 MMOL/L (ref 136–145)
VENTRICULAR RATE, ECG03: 91 BPM
WBC # BLD AUTO: 11.8 K/UL (ref 4.6–13.2)

## 2019-04-21 PROCEDURE — 85027 COMPLETE CBC AUTOMATED: CPT

## 2019-04-21 PROCEDURE — 82962 GLUCOSE BLOOD TEST: CPT

## 2019-04-21 PROCEDURE — 74011250637 HC RX REV CODE- 250/637: Performed by: HOSPITALIST

## 2019-04-21 PROCEDURE — 65660000000 HC RM CCU STEPDOWN

## 2019-04-21 PROCEDURE — 74011250636 HC RX REV CODE- 250/636: Performed by: PHYSICIAN ASSISTANT

## 2019-04-21 PROCEDURE — 74011636637 HC RX REV CODE- 636/637: Performed by: PHYSICIAN ASSISTANT

## 2019-04-21 PROCEDURE — 74011636637 HC RX REV CODE- 636/637: Performed by: HOSPITALIST

## 2019-04-21 PROCEDURE — 94761 N-INVAS EAR/PLS OXIMETRY MLT: CPT

## 2019-04-21 PROCEDURE — 80069 RENAL FUNCTION PANEL: CPT

## 2019-04-21 PROCEDURE — 74011000250 HC RX REV CODE- 250: Performed by: PHYSICIAN ASSISTANT

## 2019-04-21 PROCEDURE — 36415 COLL VENOUS BLD VENIPUNCTURE: CPT

## 2019-04-21 PROCEDURE — 85610 PROTHROMBIN TIME: CPT

## 2019-04-21 PROCEDURE — 74011000258 HC RX REV CODE- 258: Performed by: HOSPITALIST

## 2019-04-21 PROCEDURE — 74011250637 HC RX REV CODE- 250/637: Performed by: PHYSICIAN ASSISTANT

## 2019-04-21 PROCEDURE — 74011000250 HC RX REV CODE- 250: Performed by: HOSPITALIST

## 2019-04-21 PROCEDURE — 77010033678 HC OXYGEN DAILY

## 2019-04-21 RX ORDER — AZITHROMYCIN 250 MG/1
500 TABLET, FILM COATED ORAL DAILY
Status: DISCONTINUED | OUTPATIENT
Start: 2019-04-21 | End: 2019-04-21

## 2019-04-21 RX ORDER — AZITHROMYCIN 500 MG/1
500 TABLET, FILM COATED ORAL DAILY
Qty: 2 TAB | Refills: 0 | Status: SHIPPED | OUTPATIENT
Start: 2019-04-21 | End: 2019-04-23

## 2019-04-21 RX ORDER — DILTIAZEM HYDROCHLORIDE 180 MG/1
180 CAPSULE, COATED, EXTENDED RELEASE ORAL DAILY
Status: DISCONTINUED | OUTPATIENT
Start: 2019-04-21 | End: 2019-04-22

## 2019-04-21 RX ORDER — PREDNISONE 50 MG/1
50 TABLET ORAL DAILY
Qty: 5 TAB | Refills: 0 | Status: SHIPPED | OUTPATIENT
Start: 2019-04-21 | End: 2020-01-27

## 2019-04-21 RX ORDER — AZITHROMYCIN 250 MG/1
500 TABLET, FILM COATED ORAL DAILY
Status: COMPLETED | OUTPATIENT
Start: 2019-04-21 | End: 2019-04-22

## 2019-04-21 RX ORDER — DILTIAZEM HYDROCHLORIDE 180 MG/1
180 CAPSULE, COATED, EXTENDED RELEASE ORAL DAILY
Qty: 30 CAP | Refills: 0 | Status: SHIPPED | OUTPATIENT
Start: 2019-04-22 | End: 2019-04-22

## 2019-04-21 RX ADMIN — METOPROLOL TARTRATE 100 MG: 50 TABLET ORAL at 21:19

## 2019-04-21 RX ADMIN — GUAIFENESIN 1200 MG: 600 TABLET, EXTENDED RELEASE ORAL at 17:34

## 2019-04-21 RX ADMIN — INSULIN GLARGINE 5 UNITS: 100 INJECTION, SOLUTION SUBCUTANEOUS at 08:39

## 2019-04-21 RX ADMIN — ARFORMOTEROL TARTRATE 15 MCG: 15 SOLUTION RESPIRATORY (INHALATION) at 21:18

## 2019-04-21 RX ADMIN — METHYLPREDNISOLONE SODIUM SUCCINATE 60 MG: 40 INJECTION, POWDER, FOR SOLUTION INTRAMUSCULAR; INTRAVENOUS at 21:18

## 2019-04-21 RX ADMIN — METHYLPREDNISOLONE SODIUM SUCCINATE 60 MG: 40 INJECTION, POWDER, FOR SOLUTION INTRAMUSCULAR; INTRAVENOUS at 03:36

## 2019-04-21 RX ADMIN — DILTIAZEM HYDROCHLORIDE 180 MG: 180 CAPSULE, COATED, EXTENDED RELEASE ORAL at 08:37

## 2019-04-21 RX ADMIN — ALLOPURINOL 100 MG: 100 TABLET ORAL at 08:37

## 2019-04-21 RX ADMIN — ASPIRIN 81 MG: 81 TABLET, COATED ORAL at 08:38

## 2019-04-21 RX ADMIN — VITAMIN D, TAB 1000IU (100/BT) 2000 UNITS: 25 TAB at 08:37

## 2019-04-21 RX ADMIN — PANTOPRAZOLE SODIUM 40 MG: 40 TABLET, DELAYED RELEASE ORAL at 08:38

## 2019-04-21 RX ADMIN — METOPROLOL TARTRATE 5 MG: 5 INJECTION INTRAVENOUS at 23:18

## 2019-04-21 RX ADMIN — METHYLPREDNISOLONE SODIUM SUCCINATE 60 MG: 40 INJECTION, POWDER, FOR SOLUTION INTRAMUSCULAR; INTRAVENOUS at 14:29

## 2019-04-21 RX ADMIN — AZITHROMYCIN 500 MG: 250 TABLET, FILM COATED ORAL at 21:19

## 2019-04-21 RX ADMIN — GUAIFENESIN 1200 MG: 600 TABLET, EXTENDED RELEASE ORAL at 08:37

## 2019-04-21 RX ADMIN — METHYLPREDNISOLONE SODIUM SUCCINATE 60 MG: 40 INJECTION, POWDER, FOR SOLUTION INTRAMUSCULAR; INTRAVENOUS at 08:43

## 2019-04-21 RX ADMIN — WARFARIN SODIUM 5 MG: 5 TABLET ORAL at 17:34

## 2019-04-21 RX ADMIN — INSULIN LISPRO 4 UNITS: 100 INJECTION, SOLUTION INTRAVENOUS; SUBCUTANEOUS at 22:35

## 2019-04-21 RX ADMIN — TAMSULOSIN HYDROCHLORIDE 0.4 MG: 0.4 CAPSULE ORAL at 08:38

## 2019-04-21 RX ADMIN — PHENYTOIN SODIUM 300 MG: 100 CAPSULE ORAL at 21:19

## 2019-04-21 RX ADMIN — INSULIN LISPRO 2 UNITS: 100 INJECTION, SOLUTION INTRAVENOUS; SUBCUTANEOUS at 11:44

## 2019-04-21 RX ADMIN — BUDESONIDE 500 MCG: 0.5 INHALANT RESPIRATORY (INHALATION) at 21:18

## 2019-04-21 RX ADMIN — METOPROLOL TARTRATE 100 MG: 50 TABLET ORAL at 08:38

## 2019-04-21 RX ADMIN — ATORVASTATIN CALCIUM 40 MG: 40 TABLET, FILM COATED ORAL at 08:38

## 2019-04-21 RX ADMIN — INSULIN LISPRO 2 UNITS: 100 INJECTION, SOLUTION INTRAVENOUS; SUBCUTANEOUS at 08:38

## 2019-04-21 RX ADMIN — DILTIAZEM HYDROCHLORIDE 5 MG/HR: 5 INJECTION INTRAVENOUS at 01:19

## 2019-04-21 NOTE — DISCHARGE SUMMARY
2 [facility]  Hospitalist Division    Discharge Summary    Patient: Daphne Felipe MRN: [id number]  CSN: [id number]    YOB: 1945  Age: 69 y.o. Sex: male    DOA: 4/18/2019 LOS:  LOS: 3 days   Discharge Date:      Admission Diagnoses: Acute respiratory distress [R06.03]  Acute respiratory distress [R06.03]    Discharge Diagnoses:    Problem List as of 4/21/2019 Never Reviewed          Codes Class Noted - Resolved    * (Principal) Acute exacerbation of chronic obstructive pulmonary disease (COPD) ([facility] ICD-10-CM: J44.1  ICD-9-CM: 491.21  4/20/2019 - Present        A-fib ([facility] ICD-10-CM: I48.91  ICD-9-CM: 427.31  4/18/2019 - Present        Systolic heart failure ([facility] ICD-10-CM: I50.20  ICD-9-CM: 428.20  4/18/2019 - Present        Acute-on-chronic kidney injury ([facility] ICD-10-CM: N17.9, N18.9  ICD-9-CM: 584.9, 585.9  4/19/2019 - Present        Acute respiratory distress ICD-10-CM: R06.03  ICD-9-CM: 518.82  4/18/2019 - Present              Discharge Condition: Stable    Discharge To: Home    Consults: Cardiology and Nephrology    Hospital Course: Per H&P, \"Lowell Arana is a 69 y. o. male with a PMHx of COPD, MI, Afib, and Per ED notes (HF, valve replacement), who presented to the ED with one week of worsening shortness of breath, and a productive cough.  Recently saw his PCP approx 2 days ago and was given steroids.  Patient did not improve and came to the ED. Bailey Menjivar is a chronic smoker, denies chest pains, palpations, edema, weight gain.  He appears sicker than he is stating with his minimal complaints of only SOB and cough     In the ED he was found to be in afib, cardizem ggt started, elevated BNP, he was transferred to step-down, cardiology consulted. Bailey Menjivar will be admitted for further eval\"     Cardizem gtt weaned off overnight, Amiodarone gtt started. PO metoprolol and diltiazem started. Echo results below. Nephrology consulted for kidney injury. Lasix x1 given.  Renal US results below. Transferred to floor 4/20. Respiratory status significantly improved, although patient is still needing supp O2. Saturation at rest was 85% on room air. Home O2 ordered. VSS for discharge. Physical Exam:  General appearance: alert, cooperative, no distress, appears stated age  Head: Normocephalic, without obvious abnormality, atraumatic  Lungs: clear to auscultation bilaterally  Heart: regular rate and rhythm, S1, S2 normal, no murmur, click, rub or gallop  Abdomen: soft, non-tender. Bowel sounds normal. No masses,  no organomegaly  Extremities: extremities normal, atraumatic, no cyanosis or edema  Skin: Skin color, texture, turgor normal. No rashes or lesions  Neurologic: Grossly normal      Significant Diagnostic Studies:     BMP:   Lab Results   Component Value Date/Time     04/21/2019 04:37 AM    K 4.3 04/21/2019 04:37 AM     (H) 04/21/2019 04:37 AM    CO2 22 04/21/2019 04:37 AM    AGAP 11 04/21/2019 04:37 AM     (H) 04/21/2019 04:37 AM    BUN 95 (H) 04/21/2019 04:37 AM    CREA 3.10 (H) 04/21/2019 04:37 AM    GFRAA 24 (L) 04/21/2019 04:37 AM    GFRNA 20 (L) 04/21/2019 04:37 AM     CBC:   Lab Results   Component Value Date/Time    WBC 11.8 04/21/2019 04:37 AM    HGB 11.9 (L) 04/21/2019 04:37 AM    HCT 39.2 04/21/2019 04:37 AM     04/21/2019 04:37 AM       [facility]    Result Date: 4/20/2019  EXAM: Limited retroperitoneal ultrasound INDICATION: Acute renal insufficiency COMPARISON: None. _______________ FINDINGS: Study is limited secondary to bowel gas, patient body habitus and patient's inability to hold breath. RIGHT KIDNEY: 9.7 cm in length. 9 mm cyst is present in the midpole region. No hydronephrosis or obvious calculi. Increased cortical echogenicity. LEFT KIDNEY: 9.2 cm in length. There is an exophytic cyst arising from the lower pole measuring 2.8 x 1.8 x 1.9 cm. No solid mass or hydronephrosis. Increased cortical echogenicity. OTHER: None. _______________     IMPRESSION: 1. Kidneys are bilaterally echogenic suggesting medical renal disease. 2. No evidence of hydronephrosis. 3. Bilateral simple cysts. Xr Chest Port    Result Date: 4/18/2019  --------------------------------------------------------------------------- <<<<<<<<<           [facility]           >>>>>>>>> --------------------------------------------------------------------------- CLINICAL HISTORY:  Wheezing, cough. COMPARISON EXAMINATIONS:  None. ---  SINGLE FRONTAL VIEW OF THE CHEST  --- Mild increase in pulmonary interstitial markings. No evidence of consolidation or pleural fluid. Mild cardiomegaly. Prior sternotomy and cardiac valve replacement. Atherosclerotic arterial calcification. --------------    Impression: -------------- 1. Mild increase in pulmonary interstitial markings, with differential including interstitial pulmonary edema and atypical infection. No evidence of consolidation. 2. Cardiomegaly. Discharge Medications:       Current Discharge Medication List      START taking these medications    Details   predniSONE (DELTASONE) 50 mg tablet Take 1 Tab by mouth daily. Qty: 5 Tab, Refills: 0      azithromycin (ZITHROMAX) 500 mg tab Take 1 Tab by mouth daily. Qty: 2 Tab, Refills: 0      dilTIAZem CD (CARDIZEM CD) 180 mg ER capsule Take 1 Cap by mouth daily. Qty: 30 Cap, Refills: 0         CONTINUE these medications which have NOT CHANGED    Details   allopurinol (ZYLOPRIM) 100 mg tablet Take 100 mg by mouth daily. aspirin delayed-release 81 mg tablet Take 81 mg by mouth daily. atorvastatin (LIPITOR) 40 mg tablet Take 40 mg by mouth daily. cholecalciferol, vitamin D3, 2,000 unit tab Take 2,000 Units by mouth daily. ferrous sulfate 325 mg (65 mg iron) tablet Take 65 mg by mouth daily. finasteride (PROSCAR) 5 mg tablet Take 5 mg by mouth daily.       levothyroxine (SYNTHROID) 112 mcg tablet Take 112 mcg by mouth Daily (before breakfast). metoprolol tartrate (LOPRESSOR) 100 mg IR tablet Take 100 mg by mouth two (2) times a day. Omeprazole delayed release (PRILOSEC D/R) 20 mg tablet Take 20 mg by mouth daily. phenytoin ER (DILANTIN ER) 100 mg ER capsule Take 300 mg by mouth nightly. Take 3 (100mg) tablets every day at bedtime      tamsulosin (FLOMAX) 0.4 mg capsule Take 0.4 mg by mouth daily. benzonatate (TESSALON PERLES) 100 mg capsule Take 100 mg by mouth three (3) times daily as needed for Cough. albuterol (VENTOLIN HFA) 90 mcg/actuation inhaler Take 2 Puffs by inhalation every four (4) hours as needed for Wheezing. warfarin (COUMADIN) 1 mg tablet Take 5 mg by mouth daily.          STOP taking these medications       dilTIAZem ER (CARDIZEM LA) 120 mg tablet Comments:   Reason for Stopping:                 Activity: Activity as tolerated    Diet: Cardiac Diet    Wound Care: None needed    Follow-up: 1 week with PCP, cardiology, pulmonology for PFTs - patient to arrange    Discharge time: >35 minutes    [facility]  Office:  [phone]  Pager: [phone]      4/21/2019, 4:09 PM

## 2019-04-21 NOTE — PROGRESS NOTES
[facility]  -  Progress Note Patient: Lopez Lewis MRN: [id number]  SSN: [id number] YOB: 1945  Age: 69 y.o. Sex: male Admit Date: 4/18/2019 Assessment:  
 
Hospital Problems  Never Reviewed Codes Class Noted POA * (Principal) Acute exacerbation of chronic obstructive pulmonary disease (COPD) ([facility] ICD-10-CM: J44.1 ICD-9-CM: 491.21  4/20/2019 Unknown Acute-on-chronic kidney injury ([facility] ICD-10-CM: N17.9, N18.9 ICD-9-CM: 584.9, 585.9  4/19/2019 Unknown Acute respiratory distress ICD-10-CM: R06.03 
ICD-9-CM: 518.82  4/18/2019 Unknown Systolic heart failure (HCC) ICD-10-CM: I50.20 ICD-9-CM: 428.20  4/18/2019 A-fib [facility]) ICD-10-CM: I48.91 
ICD-9-CM: 427.31  4/18/2019 Unknown  
   
  
 
-Pt presented due to wheezing, shortness of breath.  Noted history of COPD from hospital/ER visit several years ago but not on ongoing COPD treatment by PCP at [facility]. 
-Atrial fibrillation with rapid ventricular response.  Chronic afib per patient, on Coumadin for anticoagulation. 
-Hx valve repair x 2 in 2011 
-Hx CAD, s/p stent placement 2006 ([facility]) 
-Hx CVA 2002 
-Hx hyperlipidemia, on Lipitor Plan:  
 
I would continue his outpatient cardiac medication regimen. His target INR is around 3.0-3.5. Increase activity as tolerated. As noted previously, he needs to discontinue tobacco use completely. Subjective: No new complaints. Shortness of breath is much improved. His wheezing has improved. Objective:  
  
Patient Vitals for the past 8 hrs: 
 Temp Pulse Resp BP SpO2  
04/21/19 1105 97.7 °F (36.5 °C) (!) 101 18 127/70 96 % 04/21/19 0805 97.8 °F (36.6 °C) (!) 103 20 131/77 95 % 04/21/19 0352 97.6 °F (36.4 °C) (!) 101 17 132/72 95 % Patient Vitals for the past 96 hrs: 
 Weight 04/20/19 1409 83.9 kg (185 lb) 04/20/19 1337 81 kg (178 lb 9.2 oz) 04/19/19 1051 81.6 kg (180 lb) 04/18/19 1154 81.6 kg (180 lb) Intake/Output Summary (Last 24 hours) at 4/21/2019 1134 Last data filed at 4/21/2019 1019 Gross per 24 hour Intake 851.13 ml Output 1520 ml Net -668.87 ml Physical Exam: 
General:  alert, cooperative, no distress, appears older than stated age Neck:  no JVD Lungs:  clear to auscultation bilaterally, wheezing has resolved for the most part Heart:  regular rate and rhythm Abdomen:  no guarding or rigidity Extremities:  no edema Data Review:  
 
Labs: Results:  
   
Chemistry Recent Labs  
  04/21/19 
3737 04/20/19 
1539 04/20/19 
0536 * 127* 152*  140 138  
K 4.3 4.4 4.3 * 107 106 CO2 22 24 22 BUN 95* 85* 75* CREA 3.10* 3.19* 3.33* CA 8.3* 8.3* 8.2* PHOS 4.8  --  5.6* AGAP 11 9 10 BUCR 31* 27* 23* ALB 2.7*  --  2.8* CBC w/Diff Recent Labs  
  04/21/19 
0437 04/20/19 
0536 04/19/19 
0200 WBC 11.8 14.4* 10.1 RBC 4.52* 4.57* 4.51* HGB 11.9* 12.3* 12.1*  
HCT 39.2 39.5 38.3  219 209 Cardiac Enzymes No results found for: CPK, CK, CKMMB, CKMB, RCK3, CKMBT, CKNDX, CKND1, GLADYS, TROPT, TROIQ, TIKA, TROPT, TNIPOC, BNP, BNPP Coagulation Recent Labs  
  04/21/19 
0437 04/20/19 
0536 PTP 30.8* 30.0* INR 2.9* 2.8* Lipid Panel No results found for: CHOL, CHOLPOCT, CHOLX, CHLST, CHOLV, 334129, HDL, LDL, LDLC, DLDLP, 052361, VLDLC, VLDL, TGLX, TRIGL, TRIGP, TGLPOCT, CHHD, CHHDX  
BNP No results found for: BNP, BNPP, XBNPT Liver Enzymes Recent Labs  
  04/21/19 
0437 ALB 2.7* Digoxin Thyroid Studies Lab Results Component Value Date/Time  TSH 0.41 04/18/2019 10:00 AM

## 2019-04-21 NOTE — PROGRESS NOTES
Bedside shift change report given to this RN (oncoming nurse) by Yoselin Red RN (offgoing nurse). Report included the following information SBAR, Kardex and Cardiac Rhythm ST, NSR.  
 
 
[phone]  Paged MD concerning the patient being on PO Cardizem and a Cardizem drip. MD stated to discontinue the Cardizem drip after the patient receive the PO Cardizem. Will continue to monitor.

## 2019-04-21 NOTE — PROGRESS NOTES
Simple Pulmonary Stress Test 
Time Rest/Walk Spo2 Comment(s) Baseline Resting           93% 6L NC HR 99  
     5     min Resting           85% Rm Air   
          min            %   
          min            % Recovery-Oxygen applied during exertion Time LPM SpO2 Comment(s) 2    min 6L    92      % O2    
      1   min 6L   93       % O2

## 2019-04-22 LAB
ALBUMIN SERPL-MCNC: 2.6 G/DL (ref 3.4–5)
ANION GAP SERPL CALC-SCNC: 8 MMOL/L (ref 3–18)
BUN SERPL-MCNC: 97 MG/DL (ref 7–18)
BUN/CREAT SERPL: 37 (ref 12–20)
CALCIUM SERPL-MCNC: 8 MG/DL (ref 8.5–10.1)
CHLORIDE SERPL-SCNC: 111 MMOL/L (ref 100–108)
CO2 SERPL-SCNC: 24 MMOL/L (ref 21–32)
CREAT SERPL-MCNC: 2.63 MG/DL (ref 0.6–1.3)
ERYTHROCYTE [DISTWIDTH] IN BLOOD BY AUTOMATED COUNT: 16.6 % (ref 11.6–14.5)
GLUCOSE BLD STRIP.AUTO-MCNC: 186 MG/DL (ref 70–110)
GLUCOSE BLD STRIP.AUTO-MCNC: 188 MG/DL (ref 70–110)
GLUCOSE BLD STRIP.AUTO-MCNC: 239 MG/DL (ref 70–110)
GLUCOSE BLD STRIP.AUTO-MCNC: 315 MG/DL (ref 70–110)
GLUCOSE SERPL-MCNC: 190 MG/DL (ref 74–99)
HCT VFR BLD AUTO: 38.4 % (ref 36–48)
HGB BLD-MCNC: 11.9 G/DL (ref 13–16)
INR PPP: 3.7 (ref 0.8–1.2)
MCH RBC QN AUTO: 26.7 PG (ref 24–34)
MCHC RBC AUTO-ENTMCNC: 31 G/DL (ref 31–37)
MCV RBC AUTO: 86.1 FL (ref 74–97)
PHOSPHATE SERPL-MCNC: 3.5 MG/DL (ref 2.5–4.9)
PLATELET # BLD AUTO: 213 K/UL (ref 135–420)
PMV BLD AUTO: 9.6 FL (ref 9.2–11.8)
POTASSIUM SERPL-SCNC: 4.5 MMOL/L (ref 3.5–5.5)
PROTHROMBIN TIME: 37 SEC (ref 11.5–15.2)
RBC # BLD AUTO: 4.46 M/UL (ref 4.7–5.5)
SODIUM SERPL-SCNC: 143 MMOL/L (ref 136–145)
WBC # BLD AUTO: 9.1 K/UL (ref 4.6–13.2)

## 2019-04-22 PROCEDURE — 85610 PROTHROMBIN TIME: CPT

## 2019-04-22 PROCEDURE — 85027 COMPLETE CBC AUTOMATED: CPT

## 2019-04-22 PROCEDURE — 94640 AIRWAY INHALATION TREATMENT: CPT

## 2019-04-22 PROCEDURE — 80069 RENAL FUNCTION PANEL: CPT

## 2019-04-22 PROCEDURE — 74011636637 HC RX REV CODE- 636/637: Performed by: PHYSICIAN ASSISTANT

## 2019-04-22 PROCEDURE — 74011250637 HC RX REV CODE- 250/637: Performed by: PHYSICIAN ASSISTANT

## 2019-04-22 PROCEDURE — 74011636637 HC RX REV CODE- 636/637: Performed by: HOSPITALIST

## 2019-04-22 PROCEDURE — 94761 N-INVAS EAR/PLS OXIMETRY MLT: CPT

## 2019-04-22 PROCEDURE — 74011250637 HC RX REV CODE- 250/637: Performed by: HOSPITALIST

## 2019-04-22 PROCEDURE — 77010033678 HC OXYGEN DAILY

## 2019-04-22 PROCEDURE — 93017 CV STRESS TEST TRACING ONLY: CPT

## 2019-04-22 PROCEDURE — 82962 GLUCOSE BLOOD TEST: CPT

## 2019-04-22 PROCEDURE — 74011250636 HC RX REV CODE- 250/636: Performed by: PHYSICIAN ASSISTANT

## 2019-04-22 PROCEDURE — 36415 COLL VENOUS BLD VENIPUNCTURE: CPT

## 2019-04-22 PROCEDURE — 74011000250 HC RX REV CODE- 250: Performed by: PHYSICIAN ASSISTANT

## 2019-04-22 PROCEDURE — 65660000000 HC RM CCU STEPDOWN

## 2019-04-22 RX ORDER — DILTIAZEM HYDROCHLORIDE 180 MG/1
180 CAPSULE, COATED, EXTENDED RELEASE ORAL ONCE
Status: COMPLETED | OUTPATIENT
Start: 2019-04-22 | End: 2019-04-22

## 2019-04-22 RX ORDER — DILTIAZEM HYDROCHLORIDE 240 MG/1
240 CAPSULE, COATED, EXTENDED RELEASE ORAL DAILY
Qty: 30 CAP | Refills: 0 | Status: SHIPPED | OUTPATIENT
Start: 2019-04-23 | End: 2020-01-27

## 2019-04-22 RX ORDER — DILTIAZEM HYDROCHLORIDE 120 MG/1
120 CAPSULE, COATED, EXTENDED RELEASE ORAL ONCE
Status: ACTIVE | OUTPATIENT
Start: 2019-04-22 | End: 2019-04-23

## 2019-04-22 RX ORDER — DILTIAZEM HYDROCHLORIDE 120 MG/1
240 CAPSULE, COATED, EXTENDED RELEASE ORAL DAILY
Status: DISCONTINUED | OUTPATIENT
Start: 2019-04-22 | End: 2019-04-23 | Stop reason: HOSPADM

## 2019-04-22 RX ADMIN — METHYLPREDNISOLONE SODIUM SUCCINATE 60 MG: 40 INJECTION, POWDER, FOR SOLUTION INTRAMUSCULAR; INTRAVENOUS at 04:22

## 2019-04-22 RX ADMIN — BENZONATATE 100 MG: 100 CAPSULE ORAL at 01:00

## 2019-04-22 RX ADMIN — ARFORMOTEROL TARTRATE 15 MCG: 15 SOLUTION RESPIRATORY (INHALATION) at 20:55

## 2019-04-22 RX ADMIN — METOPROLOL TARTRATE 100 MG: 50 TABLET ORAL at 21:32

## 2019-04-22 RX ADMIN — METHYLPREDNISOLONE SODIUM SUCCINATE 60 MG: 40 INJECTION, POWDER, FOR SOLUTION INTRAMUSCULAR; INTRAVENOUS at 21:32

## 2019-04-22 RX ADMIN — WARFARIN SODIUM 5 MG: 5 TABLET ORAL at 17:03

## 2019-04-22 RX ADMIN — VITAMIN D, TAB 1000IU (100/BT) 2000 UNITS: 25 TAB at 08:44

## 2019-04-22 RX ADMIN — DILTIAZEM HYDROCHLORIDE 180 MG: 180 CAPSULE, COATED, EXTENDED RELEASE ORAL at 01:00

## 2019-04-22 RX ADMIN — PANTOPRAZOLE SODIUM 40 MG: 40 TABLET, DELAYED RELEASE ORAL at 08:44

## 2019-04-22 RX ADMIN — PHENYTOIN SODIUM 300 MG: 100 CAPSULE ORAL at 21:32

## 2019-04-22 RX ADMIN — BUDESONIDE 500 MCG: 0.5 INHALANT RESPIRATORY (INHALATION) at 20:55

## 2019-04-22 RX ADMIN — TAMSULOSIN HYDROCHLORIDE 0.4 MG: 0.4 CAPSULE ORAL at 08:44

## 2019-04-22 RX ADMIN — GUAIFENESIN 1200 MG: 600 TABLET, EXTENDED RELEASE ORAL at 17:03

## 2019-04-22 RX ADMIN — METHYLPREDNISOLONE SODIUM SUCCINATE 60 MG: 40 INJECTION, POWDER, FOR SOLUTION INTRAMUSCULAR; INTRAVENOUS at 08:44

## 2019-04-22 RX ADMIN — ARFORMOTEROL TARTRATE 15 MCG: 15 SOLUTION RESPIRATORY (INHALATION) at 12:17

## 2019-04-22 RX ADMIN — DILTIAZEM HYDROCHLORIDE 240 MG: 120 CAPSULE, COATED, EXTENDED RELEASE ORAL at 08:44

## 2019-04-22 RX ADMIN — ALLOPURINOL 100 MG: 100 TABLET ORAL at 08:44

## 2019-04-22 RX ADMIN — AZITHROMYCIN 500 MG: 250 TABLET, FILM COATED ORAL at 21:32

## 2019-04-22 RX ADMIN — INSULIN LISPRO 2 UNITS: 100 INJECTION, SOLUTION INTRAVENOUS; SUBCUTANEOUS at 08:44

## 2019-04-22 RX ADMIN — ASPIRIN 81 MG: 81 TABLET, COATED ORAL at 08:44

## 2019-04-22 RX ADMIN — METOPROLOL TARTRATE 100 MG: 50 TABLET ORAL at 08:44

## 2019-04-22 RX ADMIN — ATORVASTATIN CALCIUM 40 MG: 40 TABLET, FILM COATED ORAL at 08:44

## 2019-04-22 RX ADMIN — BUDESONIDE 500 MCG: 0.5 INHALANT RESPIRATORY (INHALATION) at 12:17

## 2019-04-22 RX ADMIN — INSULIN GLARGINE 5 UNITS: 100 INJECTION, SOLUTION SUBCUTANEOUS at 08:45

## 2019-04-22 NOTE — PROGRESS NOTES
Pharmacy Monitoring Warfarin Indication:   Atrial Fibrillation, Valve Repair, Stent placement INR Goal:  3 - 3.5 (per Dr. Romulo Cardoza note on 4/21) DDIs: 
Drugs that may increase INR: Amiodarone and Macrolides phenytoin, allopurinol Drugs that may decrease INR: Phenytoin Other current anticoagulants/ drugs that may increase bleeding risk: Aspirin Recent Labs  
  04/22/19 
0449 04/21/19 
0437 04/20/19 
0536 HGB 11.9* 11.9* 12.3* INR 3.7* 2.9* 2.8* Daily PT/INR order in place?: YES Daily dose ordered: 5 mg PO daily Recommendation:  
- Consider lowering dose to 4 mg PO daily Florentino Quinn, PHARMD

## 2019-04-22 NOTE — ROUTINE PROCESS
1920  Bedside and Verbal shift change report given to Corbin Mayen (oncoming nurse) by Courtney Rico (offgoing nurse). Report included the following information SBAR, Kardex, Intake/Output and MAR. Received pt yelling from room about wanting to go home. Educated pt on current need for o2 and likely discharge in the AM once home o2 can be aquired. Pt seems to be very upset, repeating, \"I'm so tired of this. \"  
 
0040  Pt A flutter sustaining in 130s and up to 150s at times. Dr. Ruby Welsh paged. Orders received for one time dose of Cardizem CD 180mg and to increase daily dose to 240mg 0715  Bedside and Verbal shift change report given to [facility] (oncoming nurse) by Corbin Mayen (offgoing nurse). Report included the following information SBAR, Kardex, Intake/Output and MAR.

## 2019-04-22 NOTE — PROGRESS NOTES
Simple Pulmonary Stress Test 
Time Rest/Walk Spo2 Comment(s) Baseline Resting           95 % 5L  
      4 min Resting           74 % Rm Air  
          min            %   
          min            % Recovery-Oxygen applied during exertion Time LPM SpO2 Comment(s) 10 min 4L   
90     %   
         min           %

## 2019-04-22 NOTE — PROGRESS NOTES
RENAL PROGRESS NOTE Yossi Escobedo Assessment/Plan: · KATHE (secondary to reduced renal perfusion in a setting of A.fib with rvr and COPD exacerbation). Improving. 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 Exact baseline is unclear. · A.fib. Rate is better controlled. Card on the case. On ac with coumadin. · HFrEF. Appears fairly euvolemic. No need for diuresis at this time. · COPD exacerbation. Improving. If d/meghann- patient is to f/u with nephrology at the [place].  
                                                                                                                            
Subjective:Patient complaints off: Feels better, sob is less but hasn't ambulated yet. No CP/N/V. Appetite is good. Wants to go home. Patient Active Problem List  
Diagnosis Code  Acute respiratory distress M53.00  
 Systolic heart failure (HCC) I50.20  A-fib ([facility] I48.91  
 Acute-on-chronic kidney injury ([facility] N17.9, N18.9  Acute exacerbation of chronic obstructive pulmonary disease (COPD) ([facility] J44.1 Current Facility-Administered Medications Medication Dose Route Frequency Provider Last Rate Last Dose  dilTIAZem CD (CARDIZEM CD) capsule 240 mg  240 mg Oral DAILY Victorino Dao MD   240 mg at 04/22/19 2155  azithromycin (ZITHROMAX) tablet 500 mg  500 mg Oral DAILY Richardean Space H, DO   500 mg at 04/21/19 2119  
 guaiFENesin ER (MUCINEX) tablet 1,200 mg  1,200 mg Oral BID Richardean Space H, DO   1,200 mg at 04/21/19 1734  pantoprazole (PROTONIX) tablet 40 mg  40 mg Oral ACB Richardean Space H, DO   40 mg at 04/22/19 0844  
 metoprolol (LOPRESSOR) injection 5 mg  5 mg IntraVENous PRN Richardean Space H, DO   5 mg at 04/21/19 2318  benzonatate (TESSALON) capsule 100 mg  100 mg Oral TID PRN Victorino Dao MD   100 mg at 04/22/19 0100  
 amiodarone (NEXTERONE) 360 mg in dextrose 200 mL (1.8 mg/mL) infusion  1 mg/min IntraVENous TITRATE Rendell Rancher H, DO   Stopped at 04/20/19 1140  
 albuterol-ipratropium (DUO-NEB) 2.5 MG-0.5 MG/3 ML  3 mL Nebulization Q4H PRN Rendell Rancher H, DO   3 mL at 04/20/19 1757  metoprolol tartrate (LOPRESSOR) tablet 100 mg  100 mg Oral Q12H Mauricio DAMON PA-C   100 mg at 04/22/19 5090  insulin glargine (LANTUS) injection 5 Units  5 Units SubCUTAneous DAILY MartaglAnthony amin PA   5 Units at 04/22/19 0845  
 glucose chewable tablet 16 g  4 Tab Oral PRN Leeanne Botello PA-C      
 glucagon (GLUCAGEN) injection 1 mg  1 mg IntraMUSCular PRN Faviola DAMON PA-C      
 dextrose (D50) infusion 12.5-25 g  25-50 mL IntraVENous PRN LoxtermJose hamilton PA-C      
 acetaminophen (TYLENOL) tablet 650 mg  650 mg Oral Q4H PRN Faviola DAMON PA-C      
 ondansetron (ZOFRAN ODT) tablet 4 mg  4 mg Oral Q6H PRN Jose Black PA-C      
 arformoterol (BROVANA) neb solution 15 mcg  15 mcg Nebulization BID RT Leeanne Botello PA-C   15 mcg at 04/21/19 2118  budesonide (PULMICORT) 500 mcg/2 ml nebulizer suspension  500 mcg Nebulization BID RT Leeanne Botello PA-C   500 mcg at 04/21/19 2118  allopurinol (ZYLOPRIM) tablet 100 mg  100 mg Oral DAILY Leeanne Botello PA-C   100 mg at 04/22/19 7466  aspirin delayed-release tablet 81 mg  81 mg Oral DAILY Leeanne Botello PA-C   81 mg at 04/22/19 0844  
 atorvastatin (LIPITOR) tablet 40 mg  40 mg Oral DAILY Leeanne Botello PA-C   40 mg at 04/22/19 1588  cholecalciferol (VITAMIN D3) tablet 2,000 Units  2,000 Units Oral DAILY Leeanne Botello PA-C   2,000 Units at 04/22/19 2956  phenytoin ER (DILANTIN ER) ER capsule 300 mg  300 mg Oral QHS Leeanne Botello PA-C   300 mg at 04/21/19 2119  tamsulosin (FLOMAX) capsule 0.4 mg  0.4 mg Oral DAILY Leeanne Botello PA-C   0.4 mg at 04/22/19 0861  
 methylPREDNISolone (PF) (SOLU-MEDROL) injection 60 mg  60 mg IntraVENous Q6H Han Douglas PA-C   60 mg at 04/22/19 3468  WARFARIN INFORMATION NOTE (COUMADIN)   Other Rx Dosing/Monitoring Shae MAJOR PA-C      
 insulin lispro (HUMALOG) injection   SubCUTAneous AC&HS Gorgeleander Hilario, DO   2 Units at 04/22/19 9397  warfarin (COUMADIN) tablet 5 mg  5 mg Oral DAILY Raularlen FLORES, DO   5 mg at 04/21/19 1734 Objective Vitals:  
 04/22/19 0100 04/22/19 0437 04/22/19 0721 04/22/19 1124 BP:  151/82 147/80 144/82 Pulse: (!) 130 99 93 (!) 101 Resp:  20 18 18 Temp:  97.5 °F (36.4 °C) 97.7 °F (36.5 °C) 97.6 °F (36.4 °C) SpO2:  95% 93% 92% Weight:      
Height:      
 
 
 
Intake/Output Summary (Last 24 hours) at 4/22/2019 1159 Last data filed at 4/22/2019 1049 Gross per 24 hour Intake 720 ml Output 1000 ml Net -280 ml Admission weight: Weight: 81.6 kg (180 lb) (04/18/19 1154) Last Weight Metrics: 
Weight Loss Metrics 4/20/2019 4/18/2019 Today's Wt 185 lb -  
BMI - 28.98 kg/m2 Physical Assessment:  
 
General: NAD, alert and oriented. Neck: No jvd. LUNGS: loud bl exp wheezes. CVS EXM: S1, S2, irregular. Abdomen: soft, non tender. Lower Extremities:  trace edema. Lab CBC w/Diff Recent Labs  
  04/22/19 
0449 04/21/19 
0437 04/20/19 
0536 WBC 9.1 11.8 14.4*  
RBC 4.46* 4.52* 4.57* HGB 11.9* 11.9* 12.3*  
HCT 38.4 39.2 39.5  215 219 Chemistry Recent Labs  
  04/22/19 
0449 04/21/19 
0648 04/20/19 
1539 04/20/19 
0536 * 160* 127* 152*  143 140 138  
K 4.5 4.3 4.4 4.3 * 110* 107 106 CO2 24 22 24 22 BUN 97* 95* 85* 75* CREA 2.63* 3.10* 3.19* 3.33* CA 8.0* 8.3* 8.3* 8.2* AGAP 8 11 9 10 BUCR 37* 31* 27* 23* ALB 2.6* 2.7*  --  2.8* PHOS 3.5 4.8  --  5.6* No results found for: IRON, FE, TIBC, IBCT, PSAT, FERR Lab Results Component Value Date/Time  Calcium 8.0 (L) 04/22/2019 04:49 AM  
 Phosphorus 3.5 04/22/2019 04:49 AM  
  
 
 Dede Aleman M.D. [facility] Phone [phone] Pager [phone]

## 2019-04-22 NOTE — DISCHARGE SUMMARY
2 [facility]  Hospitalist Division    Discharge Summary    Patient: Makayla Lau MRN: [id number]  CSN: [id number]    YOB: 1945  Age: 69 y.o. Sex: male    DOA: 4/18/2019 LOS:  LOS: 4 days   Discharge Date:      Admission Diagnoses: Acute respiratory distress [R06.03]  Acute respiratory distress [R06.03]    Discharge Diagnoses:    Problem List as of 4/22/2019 Never Reviewed          Codes Class Noted - Resolved    * (Principal) Acute exacerbation of chronic obstructive pulmonary disease (COPD) ([facility].) ICD-10-CM: J44.1  ICD-9-CM: 491.21  4/20/2019 - Present        A-fib ([facility] ICD-10-CM: I48.91  ICD-9-CM: 427.31  4/18/2019 - Present        Systolic heart failure ([facility] ICD-10-CM: I50.20  ICD-9-CM: 428.20  4/18/2019 - Present        Acute-on-chronic kidney injury ([facility] ICD-10-CM: N17.9, N18.9  ICD-9-CM: 584.9, 585.9  4/19/2019 - Present        Acute respiratory distress ICD-10-CM: R06.03  ICD-9-CM: 518.82  4/18/2019 - Present              Discharge Condition: Stable    Discharge To: Home    Consults: Cardiology and Nephrology    Hospital Course: Per H&P, \"Lowell Arana is a 69 y. o. male with a PMHx of COPD, MI, Afib, and Per ED notes (HF, valve replacement), who presented to the ED with one week of worsening shortness of breath, and a productive cough.  Recently saw his PCP approx 2 days ago and was given steroids.  Patient did not improve and came to the ED. [facility] is a chronic smoker, denies chest pains, palpations, edema, weight gain.  He appears sicker than he is stating with his minimal complaints of only SOB and cough     In the ED he was found to be in afib, cardizem ggt started, elevated BNP, he was transferred to step-down, cardiology consulted. [facility] will be admitted for further eval\"     Cardizem gtt weaned off overnight, Amiodarone gtt started. PO metoprolol and diltiazem started. Echo results below. Nephrology consulted for kidney injury. Lasix x1 given.  Renal US results below. Transferred to floor 4/20. Respiratory status significantly improved, although patient is still needing supp O2. Saturation at rest was 85% on room air. Home O2 ordered. VSS for discharge. 4/22: Walk test required to be re-done on 4L before ABC will release the oxygen. Baseline 95% on 5L. Resting for 4 min on room air 74%. He recovered to 90% at 10min on 4L. Physical Exam:  General appearance: alert, cooperative, no distress, appears stated age  Head: Normocephalic, without obvious abnormality, atraumatic  Lungs: clear to auscultation bilaterally  Heart: regular rate and rhythm, S1, S2 normal, no murmur, click, rub or gallop  Abdomen: soft, non-tender. Bowel sounds normal. No masses,  no organomegaly  Extremities: extremities normal, atraumatic, no cyanosis or edema  Skin: Skin color, texture, turgor normal. No rashes or lesions  Neurologic: Grossly normal      Significant Diagnostic Studies:     BMP:   Lab Results   Component Value Date/Time     04/22/2019 04:49 AM    K 4.5 04/22/2019 04:49 AM     (H) 04/22/2019 04:49 AM    CO2 24 04/22/2019 04:49 AM    AGAP 8 04/22/2019 04:49 AM     (H) 04/22/2019 04:49 AM    BUN 97 (H) 04/22/2019 04:49 AM    CREA 2.63 (H) 04/22/2019 04:49 AM    GFRAA 29 (L) 04/22/2019 04:49 AM    GFRNA 24 (L) 04/22/2019 04:49 AM     CBC:   Lab Results   Component Value Date/Time    WBC 9.1 04/22/2019 04:49 AM    HGB 11.9 (L) 04/22/2019 04:49 AM    HCT 38.4 04/22/2019 04:49 AM     04/22/2019 04:49 AM       Us Retroperitoneum Ltd    Result Date: 4/20/2019  EXAM: Limited retroperitoneal ultrasound INDICATION: Acute renal insufficiency COMPARISON: None. _______________ FINDINGS: Study is limited secondary to bowel gas, patient body habitus and patient's inability to hold breath. RIGHT KIDNEY: 9.7 cm in length. 9 mm cyst is present in the midpole region. No hydronephrosis or obvious calculi. Increased cortical echogenicity.  LEFT KIDNEY: 9.2 cm in length. There is an exophytic cyst arising from the lower pole measuring 2.8 x 1.8 x 1.9 cm. No solid mass or hydronephrosis. Increased cortical echogenicity. OTHER: None. _______________     IMPRESSION: 1. Kidneys are bilaterally echogenic suggesting medical renal disease. 2. No evidence of hydronephrosis. 3. Bilateral simple cysts. Xr Chest Port    Result Date: 4/18/2019  --------------------------------------------------------------------------- <<<<<<<<<           [facility]           >>>>>>>>> --------------------------------------------------------------------------- CLINICAL HISTORY:  Wheezing, cough. COMPARISON EXAMINATIONS:  None. ---  SINGLE FRONTAL VIEW OF THE CHEST  --- Mild increase in pulmonary interstitial markings. No evidence of consolidation or pleural fluid. Mild cardiomegaly. Prior sternotomy and cardiac valve replacement. Atherosclerotic arterial calcification. --------------    Impression: -------------- 1. Mild increase in pulmonary interstitial markings, with differential including interstitial pulmonary edema and atypical infection. No evidence of consolidation. 2. Cardiomegaly. Discharge Medications:         Current Discharge Medication List      START taking these medications    Details   dilTIAZem CD (CARDIZEM CD) 240 mg ER capsule Take 1 Cap by mouth daily. Qty: 30 Cap, Refills: 0      predniSONE (DELTASONE) 50 mg tablet Take 1 Tab by mouth daily. Qty: 5 Tab, Refills: 0      azithromycin (ZITHROMAX) 500 mg tab Take 1 Tab by mouth daily. Qty: 2 Tab, Refills: 0         CONTINUE these medications which have NOT CHANGED    Details   allopurinol (ZYLOPRIM) 100 mg tablet Take 100 mg by mouth daily. aspirin delayed-release 81 mg tablet Take 81 mg by mouth daily. atorvastatin (LIPITOR) 40 mg tablet Take 40 mg by mouth daily. cholecalciferol, vitamin D3, 2,000 unit tab Take 2,000 Units by mouth daily.       ferrous sulfate 325 mg (65 mg iron) tablet Take 65 mg by mouth daily. finasteride (PROSCAR) 5 mg tablet Take 5 mg by mouth daily. levothyroxine (SYNTHROID) 112 mcg tablet Take 112 mcg by mouth Daily (before breakfast). metoprolol tartrate (LOPRESSOR) 100 mg IR tablet Take 100 mg by mouth two (2) times a day. Omeprazole delayed release (PRILOSEC D/R) 20 mg tablet Take 20 mg by mouth daily. phenytoin ER (DILANTIN ER) 100 mg ER capsule Take 300 mg by mouth nightly. Take 3 (100mg) tablets every day at bedtime      tamsulosin (FLOMAX) 0.4 mg capsule Take 0.4 mg by mouth daily. benzonatate (TESSALON PERLES) 100 mg capsule Take 100 mg by mouth three (3) times daily as needed for Cough. albuterol (VENTOLIN HFA) 90 mcg/actuation inhaler Take 2 Puffs by inhalation every four (4) hours as needed for Wheezing. warfarin (COUMADIN) 1 mg tablet Take 5 mg by mouth daily.          STOP taking these medications       dilTIAZem ER (CARDIZEM LA) 120 mg tablet Comments:   Reason for Stopping:                   Activity: Activity as tolerated    Diet: Cardiac Diet    Wound Care: None needed    Follow-up: 1 week with PCP, 2-3 weeks with cardiology, pulmonology for PFTs, nephrology at the [place] - patient to arrange    Discharge time: >35 minutes    [facility]  Office:  [phone]  Pager: [phone]      4/22/2019, 4:09 PM

## 2019-04-22 NOTE — PROGRESS NOTES
Home oxygen orders, and walk test have been faxed and signed by the CARMELO Beard. Awaiting [facility] medicals response. Spoke with Marta from [facility] patient needs a walk / rest test on 4 lpm before she will release oxygen. Updated walk/ rest test has been faxed to [facility]. 
 
Call [facility] they have received the updated walk / res ttest and is reviewing it at this time.

## 2019-04-22 NOTE — PROGRESS NOTES
I was contacted by Ana De Anda at [facility] that patient needed new sheet for walk test. She stated patient does not have o2 saturation goal set by MD that qualifys 6 l o2 for home. I discussed with Janine Chen. PA signed new walk test sheet and new orders and PA / MD noted in order to keep sats 90% or higher. Demographic sheet, lavern zaldivar, home care consult, walk test and new order faxed to after hours fax number [phone] at [facility]. I got confirmation that fax went thru. I went and talked with pt who said he wants to go home and wanted to know why he wasn't going today. I discussed with him. Case Management to follow. Homa Shepard. Braden Slaughter RN, BSN [facility] [phone], Pager [phone]

## 2019-04-22 NOTE — ANCILLARY DISCHARGE INSTRUCTIONS
Patient and/or next of kin has been given the [facility] Important Message From Medicare About Your Rights\" letter and all questions were answered. Paper copy.

## 2019-04-22 NOTE — PROGRESS NOTES
conducted a Follow up consultation and Spiritual Assessment for Daphne Felipe, who is a 69 y. o.,male. The  provided the following Interventions: 
Continued the relationship of care and support. Listened empathically. Offered prayer and assurance of continued prayer on patients behalf. Chart reviewed. The following outcomes were achieved: 
Patient expressed gratitude for 's visit. Assessment: 
There are no spiritual or Protestant issues which require intervention at this time. Plan: 
Chaplains will continue to follow and will provide pastoral care on an as needed/requested basis.  recommends bedside caregivers page  on duty if patient shows signs of acute spiritual or emotional distress. Fransisca Porras M.Div. , [address] [address] [facility]: [phone]/VYF: [phone]

## 2019-04-22 NOTE — PROGRESS NOTES
[facility]  -  Progress Note Patient: Gisell Beck MRN: [id number]  SSN: [id number] YOB: 1945  Age: 69 y.o. Sex: male Admit Date: 4/18/2019 Assessment:  
 
Hospital Problems  Never Reviewed Codes Class Noted POA * (Principal) Acute exacerbation of chronic obstructive pulmonary disease (COPD) ([facility] ICD-10-CM: J44.1 ICD-9-CM: 491.21  4/20/2019 Unknown Acute-on-chronic kidney injury ([facility] ICD-10-CM: N17.9, N18.9 ICD-9-CM: 584.9, 585.9  4/19/2019 Unknown Acute respiratory distress ICD-10-CM: R06.03 
ICD-9-CM: 518.82  4/18/2019 Unknown Systolic heart failure (HCC) ICD-10-CM: I50.20 ICD-9-CM: 428.20  4/18/2019 A-fib [facility]) ICD-10-CM: I48.91 
ICD-9-CM: 427.31  4/18/2019 Unknown  
   
  
 
-Pt presented due to wheezing, shortness of breath.  Noted history of COPD from hospital/ER visit several years ago but not on ongoing COPD treatment by PCP at [facility]. 
-Atrial fibrillation with rapid ventricular response.  Chronic afib per patient, on Coumadin for anticoagulation. 
-Hx valve repair in 2011 
-Hx CAD, s/p stent placement 2006 ([facility]) 
-Hx CVA 2002 
-Hx hyperlipidemia, on Lipitor Plan: On tele monitor today appers sinus rhythm Continue current cardiac medication regimen. His target INR is around 3.0-3.5. Increase activity as tolerated. Heart rate overall is better and hopefully will continue to get better with the treatment of COPD and oxygen No further cardiac work up planned at this time unless clinical status changes. Call us back if needed. Will be available as needed. Will need to follow up in cardiology clinic in 2-3 weeks after discharge. Thank you. Subjective: No new complaints. He wants to go home. He is upset that he is in [facility] 
 
Objective:  
  
Patient Vitals for the past 8 hrs: 
 Temp Pulse Resp BP SpO2  
04/22/19 1215     93 % 04/22/19 1124 97.6 °F (36.4 °C) (!) 101 18 144/82 92 % 04/22/19 0721 97.7 °F (36.5 °C) 93 18 147/80 93 % Patient Vitals for the past 96 hrs: 
 Weight 04/20/19 1409 185 lb (83.9 kg) 04/20/19 1337 178 lb 9.2 oz (81 kg) 04/19/19 1051 180 lb (81.6 kg) Intake/Output Summary (Last 24 hours) at 4/22/2019 1502 Last data filed at 4/22/2019 1457 Gross per 24 hour Intake 840 ml Output 1000 ml Net -160 ml Physical Exam: 
General:  alert, cooperative, no distress, appears older than stated age Neck:  no JVD Lungs:  Mild wheezing otherwise no rales Heart:  regular rate and rhythm Abdomen:  no guarding or rigidity Extremities:  no edema Data Review:  
 
Labs: Results:  
   
Chemistry Recent Labs  
  04/22/19 0449 04/21/19 
0437 04/20/19 
1539 04/20/19 
0536 * 160* 127* 152*  143 140 138  
K 4.5 4.3 4.4 4.3 * 110* 107 106 CO2 24 22 24 22 BUN 97* 95* 85* 75* CREA 2.63* 3.10* 3.19* 3.33* CA 8.0* 8.3* 8.3* 8.2* PHOS 3.5 4.8  --  5.6* AGAP 8 11 9 10 BUCR 37* 31* 27* 23* ALB 2.6* 2.7*  --  2.8* CBC w/Diff Recent Labs  
  04/22/19 0449 04/21/19 
0437 04/20/19 
0536 WBC 9.1 11.8 14.4*  
RBC 4.46* 4.52* 4.57* HGB 11.9* 11.9* 12.3*  
HCT 38.4 39.2 39.5  215 219 Cardiac Enzymes No results found for: CPK, CK, CKMMB, CKMB, RCK3, CKMBT, CKNDX, CKND1, GLADYS, TROPT, TROIQ, TIKA, TROPT, TNIPOC, BNP, BNPP Coagulation Recent Labs  
  04/22/19 0449 04/21/19 
0437 PTP 37.0* 30.8* INR 3.7* 2.9* Lipid Panel No results found for: CHOL, CHOLPOCT, CHOLX, CHLST, CHOLV, 157446, HDL, LDL, LDLC, DLDLP, 939319, VLDLC, VLDL, TGLX, TRIGL, TRIGP, TGLPOCT, CHHD, CHHDX  
BNP No results found for: BNP, BNPP, XBNPT Liver Enzymes Recent Labs  
  04/22/19 0449 ALB 2.6* Digoxin Thyroid Studies Lab Results Component Value Date/Time  TSH 0.41 04/18/2019 10:00 AM

## 2019-04-23 ENCOUNTER — HOME HEALTH ADMISSION (OUTPATIENT)
Dept: HOME HEALTH SERVICES | Facility: HOME HEALTH | Age: 74
End: 2019-04-23

## 2019-04-23 VITALS
OXYGEN SATURATION: 96 % | BODY MASS INDEX: 26.01 KG/M2 | HEIGHT: 67 IN | RESPIRATION RATE: 18 BRPM | DIASTOLIC BLOOD PRESSURE: 89 MMHG | HEART RATE: 107 BPM | SYSTOLIC BLOOD PRESSURE: 138 MMHG | WEIGHT: 165.7 LBS | TEMPERATURE: 97.6 F

## 2019-04-23 LAB
ALBUMIN SERPL-MCNC: 2.6 G/DL (ref 3.4–5)
ANION GAP SERPL CALC-SCNC: 8 MMOL/L (ref 3–18)
BUN SERPL-MCNC: 86 MG/DL (ref 7–18)
BUN/CREAT SERPL: 41 (ref 12–20)
CALCIUM SERPL-MCNC: 8.5 MG/DL (ref 8.5–10.1)
CHLORIDE SERPL-SCNC: 110 MMOL/L (ref 100–108)
CO2 SERPL-SCNC: 24 MMOL/L (ref 21–32)
CREAT SERPL-MCNC: 2.11 MG/DL (ref 0.6–1.3)
ERYTHROCYTE [DISTWIDTH] IN BLOOD BY AUTOMATED COUNT: 16.4 % (ref 11.6–14.5)
GLUCOSE BLD STRIP.AUTO-MCNC: 195 MG/DL (ref 70–110)
GLUCOSE BLD STRIP.AUTO-MCNC: 300 MG/DL (ref 70–110)
GLUCOSE SERPL-MCNC: 173 MG/DL (ref 74–99)
HCT VFR BLD AUTO: 36.8 % (ref 36–48)
HGB BLD-MCNC: 11.5 G/DL (ref 13–16)
INR PPP: 5 (ref 0.8–1.2)
MCH RBC QN AUTO: 26.8 PG (ref 24–34)
MCHC RBC AUTO-ENTMCNC: 31.3 G/DL (ref 31–37)
MCV RBC AUTO: 85.8 FL (ref 74–97)
PHOSPHATE SERPL-MCNC: 3.7 MG/DL (ref 2.5–4.9)
PLATELET # BLD AUTO: 189 K/UL (ref 135–420)
PMV BLD AUTO: 9.4 FL (ref 9.2–11.8)
POTASSIUM SERPL-SCNC: 4.8 MMOL/L (ref 3.5–5.5)
PROTHROMBIN TIME: 47.7 SEC (ref 11.5–15.2)
RBC # BLD AUTO: 4.29 M/UL (ref 4.7–5.5)
SODIUM SERPL-SCNC: 142 MMOL/L (ref 136–145)
WBC # BLD AUTO: 10.1 K/UL (ref 4.6–13.2)

## 2019-04-23 PROCEDURE — 36415 COLL VENOUS BLD VENIPUNCTURE: CPT

## 2019-04-23 PROCEDURE — 74011250637 HC RX REV CODE- 250/637: Performed by: HOSPITALIST

## 2019-04-23 PROCEDURE — 85027 COMPLETE CBC AUTOMATED: CPT

## 2019-04-23 PROCEDURE — 94761 N-INVAS EAR/PLS OXIMETRY MLT: CPT

## 2019-04-23 PROCEDURE — 85610 PROTHROMBIN TIME: CPT

## 2019-04-23 PROCEDURE — 74011250636 HC RX REV CODE- 250/636: Performed by: PHYSICIAN ASSISTANT

## 2019-04-23 PROCEDURE — 74011250637 HC RX REV CODE- 250/637: Performed by: PHYSICIAN ASSISTANT

## 2019-04-23 PROCEDURE — 82962 GLUCOSE BLOOD TEST: CPT

## 2019-04-23 PROCEDURE — 80069 RENAL FUNCTION PANEL: CPT

## 2019-04-23 PROCEDURE — 94640 AIRWAY INHALATION TREATMENT: CPT

## 2019-04-23 PROCEDURE — 74011000250 HC RX REV CODE- 250: Performed by: PHYSICIAN ASSISTANT

## 2019-04-23 PROCEDURE — 77010033678 HC OXYGEN DAILY

## 2019-04-23 RX ADMIN — METHYLPREDNISOLONE SODIUM SUCCINATE 60 MG: 40 INJECTION, POWDER, FOR SOLUTION INTRAMUSCULAR; INTRAVENOUS at 09:11

## 2019-04-23 RX ADMIN — METHYLPREDNISOLONE SODIUM SUCCINATE 60 MG: 40 INJECTION, POWDER, FOR SOLUTION INTRAMUSCULAR; INTRAVENOUS at 04:41

## 2019-04-23 RX ADMIN — ATORVASTATIN CALCIUM 40 MG: 40 TABLET, FILM COATED ORAL at 09:12

## 2019-04-23 RX ADMIN — VITAMIN D, TAB 1000IU (100/BT) 2000 UNITS: 25 TAB at 09:12

## 2019-04-23 RX ADMIN — ARFORMOTEROL TARTRATE 15 MCG: 15 SOLUTION RESPIRATORY (INHALATION) at 08:43

## 2019-04-23 RX ADMIN — BUDESONIDE 500 MCG: 0.5 INHALANT RESPIRATORY (INHALATION) at 08:43

## 2019-04-23 RX ADMIN — DILTIAZEM HYDROCHLORIDE 240 MG: 120 CAPSULE, COATED, EXTENDED RELEASE ORAL at 09:12

## 2019-04-23 RX ADMIN — ASPIRIN 81 MG: 81 TABLET, COATED ORAL at 09:11

## 2019-04-23 RX ADMIN — GUAIFENESIN 1200 MG: 600 TABLET, EXTENDED RELEASE ORAL at 09:12

## 2019-04-23 RX ADMIN — METOPROLOL TARTRATE 100 MG: 50 TABLET ORAL at 09:11

## 2019-04-23 RX ADMIN — TAMSULOSIN HYDROCHLORIDE 0.4 MG: 0.4 CAPSULE ORAL at 09:12

## 2019-04-23 RX ADMIN — ALLOPURINOL 100 MG: 100 TABLET ORAL at 09:12

## 2019-04-23 RX ADMIN — PANTOPRAZOLE SODIUM 40 MG: 40 TABLET, DELAYED RELEASE ORAL at 09:12

## 2019-04-23 NOTE — PROGRESS NOTES
Care Management Interventions PCP Verified by CM: Yes(He told me Oct 2018) Mode of Transport at Discharge: Self Transition of Care Consult (CM Consult): Discharge Planning MyChart Signup: No 
Discharge Durable Medical Equipment: No 
Physical Therapy Consult: No 
Occupational Therapy Consult: No 
Speech Therapy Consult: No 
Current Support Network: Lives with Spouse, Relative's Home(He says its his daughter's home and she lives there also) Confirm Follow Up Transport: Self Plan discussed with Pt/Family/Caregiver: Yes Portable Oxygen tank delivered to patient's room along with oxygen education sheet What you should Know about Oxygen therapy. Per [facility] they will be out to patient's home to further educate patient. Spoke with patient and he stated that his Son in law will pick him up. Spoke with patient's son - in- and he has requested that when patient is ready to go to please call him and he will leave work to retrieve patient Brain [phone] Agreeable to [facility] COPD at this time, referral [facility] submitted and called to Keeley yang.

## 2019-04-23 NOTE — ROUTINE PROCESS
2240  HR sustaining in 130s-140s. Dr. Tejas bowser. Orders received for one time dose of 120mg Cardizem CD 
 
0000  HR now in 90s. Cardizem held at this time. 0730  Bedside and Verbal shift change report given to Viviana Mireles (oncoming nurse) by Wesly Adamson (offgoing nurse). Report included the following information SBAR, Kardex, Intake/Output and MAR.

## 2019-04-23 NOTE — PROGRESS NOTES
Pharmacy Monitoring Warfarin Indication:   Atrial Fibrillation, Valve Repair, Stent placement INR Goal:  3 - 3.5 (per Dr. Romulo Cardoza note on 4/21) DDIs: 
Drugs that may increase INR: Amiodarone and Macrolides phenytoin, allopurinol Drugs that may decrease INR: Phenytoin Other current anticoagulants/ drugs that may increase bleeding risk: Aspirin Recent Labs  
  04/23/19 
0555 04/22/19 
0449 04/21/19 
7548 HGB 11.5* 11.9* 11.9* INR 5.0* 3.7* 2.9* Daily PT/INR order in place?: YES Daily dose ordered: 5 mg PO daily Recommendation:  
- Consider holding Coumadin dose until INR becomes therapeutic 
- Daily INR levels are scheduled Thanks, 
Diamond Mac, PHARMD

## 2019-04-23 NOTE — HOME CARE
Referral for [facility] COPD received. Order noted. Will arrange. Marylou Noe RN, BSN 
[employer]

## 2019-04-23 NOTE — PROGRESS NOTES
Problem: Chronic Obstructive Pulmonary Disease (COPD) Goal: *Oxygen saturation during activity within specified parameters 4/23/2019 1137 by Robertson Hasten Outcome: Progressing Towards Goal 
4/23/2019 1046 by Robertson Hasten Outcome: Progressing Towards Goal

## 2019-04-23 NOTE — PROGRESS NOTES
Spoke with Leslie Lara at [place] [address] and she stated that they have every thing for oxygen  after  Multiple faxes and will now call the patient. Awaiting for [facility] medical to allow oxygen release.

## 2019-04-23 NOTE — DISCHARGE SUMMARY
[facility]    Discharge Summary    Patient: Makayla Lau MRN: [id number]  [facility]: [id number]    YOB: 1945  Age: 69 y.o. Sex: male    DOA: 4/18/2019 LOS:  LOS: 5 days   Discharge Date:      Admission Diagnoses: Acute respiratory distress [R06.03]  Acute respiratory distress [R06.03]    Discharge Diagnoses:    Problem List as of 4/23/2019 Never Reviewed          Codes Class Noted - Resolved    * (Principal) Acute exacerbation of chronic obstructive pulmonary disease (COPD) ([facility] ICD-10-CM: J44.1  ICD-9-CM: 491.21  4/20/2019 - Present        A-fib ([facility] ICD-10-CM: I48.91  ICD-9-CM: 427.31  4/18/2019 - Present        Systolic heart failure ([facility] ICD-10-CM: I50.20  ICD-9-CM: 428.20  4/18/2019 - Present        Acute-on-chronic kidney injury ([facility].) ICD-10-CM: N17.9, N18.9  ICD-9-CM: 584.9, 585.9  4/19/2019 - Present        Acute respiratory distress ICD-10-CM: R06.03  ICD-9-CM: 518.82  4/18/2019 - Present              Discharge Condition: Stable    Discharge To: Home    Consults: Cardiology and Nephrology    Hospital Course: Per H&P, \"Lowell Arana is a 69 y. o. male with a PMHx of COPD, MI, Afib, and Per ED notes (HF, valve replacement), who presented to the ED with one week of worsening shortness of breath, and a productive cough.  Recently saw his PCP approx 2 days ago and was given steroids.  Patient did not improve and came to the ED. [facility] is a chronic smoker, denies chest pains, palpations, edema, weight gain.  He appears sicker than he is stating with his minimal complaints of only SOB and cough     In the ED he was found to be in afib, cardizem ggt started, elevated BNP, he was transferred to step-down, cardiology consulted. [facility] will be admitted for further eval\"     Cardizem gtt weaned off overnight, Amiodarone gtt started. PO metoprolol and diltiazem started. Echo results below. Nephrology consulted for kidney injury. Lasix x1 given.  Renal US results below. Transferred to floor 4/20. Respiratory status significantly improved, although patient is still needing supp O2. Saturation at rest was 85% on room air. Home O2 ordered. VSS for discharge. 4/22: Walk test required to be re-done on 4L before ABC will release the oxygen. Baseline 95% on 5L. Resting for 4 min on room air 74%. He recovered to 90% at 10min on 4L.     4/23: Home O2 delivered to patient's room. VSS for discharge home. Course of azithromycin completed. Creatinine continues to trend down. Physical Exam:  General appearance: alert, cooperative, no distress, appears stated age  Head: Normocephalic, without obvious abnormality, atraumatic  Lungs: clear to auscultation bilaterally  Heart: regular rate and rhythm, S1, S2 normal, no murmur, click, rub or gallop  Abdomen: soft, non-tender. Bowel sounds normal. No masses,  no organomegaly  Extremities: extremities normal, atraumatic, no cyanosis or edema  Skin: Skin color, texture, turgor normal. No rashes or lesions  Neurologic: Grossly normal      Significant Diagnostic Studies:     BMP:   Lab Results   Component Value Date/Time     04/23/2019 05:55 AM    K 4.8 04/23/2019 05:55 AM     (H) 04/23/2019 05:55 AM    CO2 24 04/23/2019 05:55 AM    AGAP 8 04/23/2019 05:55 AM     (H) 04/23/2019 05:55 AM    BUN 86 (H) 04/23/2019 05:55 AM    CREA 2.11 (H) 04/23/2019 05:55 AM    GFRAA 38 (L) 04/23/2019 05:55 AM    GFRNA 31 (L) 04/23/2019 05:55 AM     CBC:   Lab Results   Component Value Date/Time    WBC 10.1 04/23/2019 05:55 AM    HGB 11.5 (L) 04/23/2019 05:55 AM    HCT 36.8 04/23/2019 05:55 AM     04/23/2019 05:55 AM       Us Retroperitoneum Ltd    Result Date: 4/20/2019  EXAM: Limited retroperitoneal ultrasound INDICATION: Acute renal insufficiency COMPARISON: None. _______________ FINDINGS: Study is limited secondary to bowel gas, patient body habitus and patient's inability to hold breath. RIGHT KIDNEY: 9.7 cm in length.  9 mm cyst is present in the midpole region. No hydronephrosis or obvious calculi. Increased cortical echogenicity. LEFT KIDNEY: 9.2 cm in length. There is an exophytic cyst arising from the lower pole measuring 2.8 x 1.8 x 1.9 cm. No solid mass or hydronephrosis. Increased cortical echogenicity. OTHER: None. _______________     IMPRESSION: 1. Kidneys are bilaterally echogenic suggesting medical renal disease. 2. No evidence of hydronephrosis. 3. Bilateral simple cysts. Xr Chest Port    Result Date: 4/18/2019  --------------------------------------------------------------------------- <<<<<<<<<           [facility]           >>>>>>>>> --------------------------------------------------------------------------- CLINICAL HISTORY:  Wheezing, cough. COMPARISON EXAMINATIONS:  None. ---  SINGLE FRONTAL VIEW OF THE CHEST  --- Mild increase in pulmonary interstitial markings. No evidence of consolidation or pleural fluid. Mild cardiomegaly. Prior sternotomy and cardiac valve replacement. Atherosclerotic arterial calcification. --------------    Impression: -------------- 1. Mild increase in pulmonary interstitial markings, with differential including interstitial pulmonary edema and atypical infection. No evidence of consolidation. 2. Cardiomegaly. Discharge Medications:         Current Discharge Medication List      START taking these medications    Details   dilTIAZem CD (CARDIZEM CD) 240 mg ER capsule Take 1 Cap by mouth daily. Qty: 30 Cap, Refills: 0      predniSONE (DELTASONE) 50 mg tablet Take 1 Tab by mouth daily. Qty: 5 Tab, Refills: 0         CONTINUE these medications which have NOT CHANGED    Details   allopurinol (ZYLOPRIM) 100 mg tablet Take 100 mg by mouth daily. aspirin delayed-release 81 mg tablet Take 81 mg by mouth daily. atorvastatin (LIPITOR) 40 mg tablet Take 40 mg by mouth daily.       cholecalciferol, vitamin D3, 2,000 unit tab Take 2,000 Units by mouth daily. ferrous sulfate 325 mg (65 mg iron) tablet Take 65 mg by mouth daily. finasteride (PROSCAR) 5 mg tablet Take 5 mg by mouth daily. levothyroxine (SYNTHROID) 112 mcg tablet Take 112 mcg by mouth Daily (before breakfast). metoprolol tartrate (LOPRESSOR) 100 mg IR tablet Take 100 mg by mouth two (2) times a day. Omeprazole delayed release (PRILOSEC D/R) 20 mg tablet Take 20 mg by mouth daily. phenytoin ER (DILANTIN ER) 100 mg ER capsule Take 300 mg by mouth nightly. Take 3 (100mg) tablets every day at bedtime      tamsulosin (FLOMAX) 0.4 mg capsule Take 0.4 mg by mouth daily. benzonatate (TESSALON PERLES) 100 mg capsule Take 100 mg by mouth three (3) times daily as needed for Cough. albuterol (VENTOLIN HFA) 90 mcg/actuation inhaler Take 2 Puffs by inhalation every four (4) hours as needed for Wheezing. warfarin (COUMADIN) 1 mg tablet Take 5 mg by mouth daily.          STOP taking these medications       dilTIAZem ER (CARDIZEM LA) 120 mg tablet Comments:   Reason for Stopping:                 Activity: Activity as tolerated    Diet: Cardiac Diet    Wound Care: None needed    Follow-up: 1 week with PCP, 2-3 weeks with cardiology, pulmonology for PFTs, nephrology at the [place] - patient to arrange    Discharge time: >35 minutes    NOAH [facility]  Office:  [phone]  Pager: [phone]      4/23/2019, 4:09 PM

## 2019-04-23 NOTE — PROGRESS NOTES
Discharge instructions reviewed with pt on behalf of primary nurse Han Castillo. Reiterated education on need for home oxygen at all times. Provided pt written prescription for cardizem and prednisone for pt to have filled through the [place]. Pt dressed and peripheral IV's removed. Pt son notified pt is ready for discharge and will be here to pick him up.

## 2019-04-24 ENCOUNTER — HOME CARE VISIT (OUTPATIENT)
Dept: SCHEDULING | Facility: HOME HEALTH | Age: 74
End: 2019-04-24

## 2019-04-24 PROCEDURE — G0299 HHS/HOSPICE OF RN EA 15 MIN: HCPCS

## 2020-01-27 ENCOUNTER — TELEPHONE (OUTPATIENT)
Dept: FAMILY MEDICINE CLINIC | Age: 75
End: 2020-01-27

## 2020-01-27 ENCOUNTER — OFFICE VISIT (OUTPATIENT)
Dept: FAMILY MEDICINE CLINIC | Age: 75
End: 2020-01-27

## 2020-01-27 VITALS
SYSTOLIC BLOOD PRESSURE: 110 MMHG | WEIGHT: 179 LBS | BODY MASS INDEX: 28.09 KG/M2 | HEART RATE: 68 BPM | DIASTOLIC BLOOD PRESSURE: 64 MMHG | TEMPERATURE: 96 F | OXYGEN SATURATION: 100 % | HEIGHT: 67 IN | RESPIRATION RATE: 14 BRPM

## 2020-01-27 DIAGNOSIS — Z13.39 SCREENING FOR ALCOHOLISM: ICD-10-CM

## 2020-01-27 DIAGNOSIS — Z13.31 SCREENING FOR DEPRESSION: ICD-10-CM

## 2020-01-27 DIAGNOSIS — E03.9 ACQUIRED HYPOTHYROIDISM: ICD-10-CM

## 2020-01-27 DIAGNOSIS — H91.93 DECREASED HEARING OF BOTH EARS: ICD-10-CM

## 2020-01-27 DIAGNOSIS — D50.8 IRON DEFICIENCY ANEMIA SECONDARY TO INADEQUATE DIETARY IRON INTAKE: ICD-10-CM

## 2020-01-27 DIAGNOSIS — E55.9 VITAMIN D DEFICIENCY: ICD-10-CM

## 2020-01-27 DIAGNOSIS — Z00.00 MEDICARE ANNUAL WELLNESS VISIT, SUBSEQUENT: Primary | ICD-10-CM

## 2020-01-27 DIAGNOSIS — E78.2 MIXED HYPERLIPIDEMIA: ICD-10-CM

## 2020-01-27 DIAGNOSIS — Z79.899 LONG TERM CURRENT USE OF DIURETIC: ICD-10-CM

## 2020-01-27 DIAGNOSIS — N18.9 CHRONIC KIDNEY DISEASE, UNSPECIFIED CKD STAGE: ICD-10-CM

## 2020-01-27 PROBLEM — N17.9 ACUTE-ON-CHRONIC KIDNEY INJURY (HCC): Status: RESOLVED | Noted: 2019-04-19 | Resolved: 2020-01-27

## 2020-01-27 PROBLEM — Z98.890 HISTORY OF HEART VALVE REPAIR: Status: ACTIVE | Noted: 2020-01-27

## 2020-01-27 PROBLEM — D64.9 ANEMIA: Status: ACTIVE | Noted: 2019-04-27

## 2020-01-27 PROBLEM — R56.9 SEIZURES (HCC): Status: ACTIVE | Noted: 2020-01-27

## 2020-01-27 PROBLEM — J44.9 CHRONIC OBSTRUCTIVE PULMONARY DISEASE (HCC): Status: ACTIVE | Noted: 2019-04-20

## 2020-01-27 PROBLEM — R06.03 ACUTE RESPIRATORY DISTRESS: Status: RESOLVED | Noted: 2019-04-18 | Resolved: 2020-01-27

## 2020-01-27 RX ORDER — DILTIAZEM HYDROCHLORIDE EXTENDED-RELEASE TABLETS 360 MG/1
360 TABLET, EXTENDED RELEASE ORAL DAILY
COMMUNITY
End: 2021-03-17 | Stop reason: DRUGHIGH

## 2020-01-27 RX ORDER — FUROSEMIDE 40 MG/1
20 TABLET ORAL EVERY OTHER DAY
COMMUNITY
End: 2022-02-15

## 2020-01-27 RX ORDER — MIDODRINE HYDROCHLORIDE 10 MG/1
10 TABLET ORAL
COMMUNITY
Start: 2019-05-09 | End: 2021-03-17 | Stop reason: DRUGHIGH

## 2020-01-27 RX ORDER — TERAZOSIN 2 MG/1
2 CAPSULE ORAL
COMMUNITY
Start: 2019-05-09

## 2020-01-27 RX ORDER — METOPROLOL TARTRATE 50 MG/1
75 TABLET ORAL 2 TIMES DAILY
COMMUNITY
End: 2021-05-05 | Stop reason: DRUGHIGH

## 2020-01-27 RX ORDER — POTASSIUM CHLORIDE 750 MG/1
10 CAPSULE, EXTENDED RELEASE ORAL 2 TIMES DAILY
COMMUNITY
Start: 2022-01-28 | End: 2022-01-28

## 2020-01-27 RX ORDER — PHENYTOIN SODIUM 100 MG/1
100 CAPSULE, EXTENDED RELEASE ORAL 3 TIMES DAILY
COMMUNITY
End: 2020-01-27

## 2020-01-27 NOTE — PROGRESS NOTES
Kassidy Rodrigues               [phone]    This is the Subsequent Medicare Annual Wellness Exam, performed 12 months or more after the Initial AWV or the last Subsequent AWV    I have reviewed the patient's medical history in detail and updated the computerized patient record. History     Patient Active Problem List   Diagnosis Code    Systolic heart failure (HCC) I50.20    A-fib (HCC) I48.91    Chronic obstructive pulmonary disease (HCC) J44.9    Seizures (HCC) R56.9    Anemia D64.9    CKD (chronic kidney disease) N18.9    Hypothyroid E03.9    History of heart valve repair Z98.890    Mixed hyperlipidemia E78.2     Past Medical History:   Diagnosis Date    A-fib [facility])     [facility]     MI [facility]myocardial infarction) ([facility]     Seizures ([facility]     2002    Stroke ([facility]     2002      Past Surgical History:   Procedure Laterality Date    CARDIAC SURG PROCEDURE UNLIST       Current Outpatient Medications   Medication Sig Dispense Refill    furosemide (LASIX) 40 mg tablet Take 40 mg by mouth.  terazosin (HYTRIN) 2 mg capsule Take 2 mg by mouth.  midodrine (PROAMITINE) 10 mg tablet Take 10 mg by mouth.  potassium chloride SA (MICRO-K) 10 mEq capsule Take 10 mEq by mouth two (2) times a day.  dilTIAZem ER (CARDIZEM LA) 360 mg Tb24 tablet Take 360 mg by mouth daily.  metoprolol tartrate (LOPRESSOR) 50 mg tablet Take 75 mg by mouth two (2) times a day.  edoxaban (SAVAYSA) 30 mg tablet Take 30 mg by mouth daily.  allopurinol (ZYLOPRIM) 100 mg tablet Take 100 mg by mouth daily.  aspirin delayed-release 81 mg tablet Take 81 mg by mouth daily.  atorvastatin (LIPITOR) 40 mg tablet Take 40 mg by mouth daily.  cholecalciferol, vitamin D3, 2,000 unit tab Take 2,000 Units by mouth daily.  ferrous sulfate 325 mg (65 mg iron) tablet Take 65 mg by mouth daily.       levothyroxine (SYNTHROID) 112 mcg tablet Take 112 mcg by mouth Daily (before breakfast).  Omeprazole delayed release (PRILOSEC D/R) 20 mg tablet Take 20 mg by mouth daily.  phenytoin ER (DILANTIN ER) 100 mg ER capsule Take 300 mg by mouth nightly. Take 3 (100mg) tablets every day at bedtime      albuterol (VENTOLIN HFA) 90 mcg/actuation inhaler Take 2 Puffs by inhalation every four (4) hours as needed for Wheezing. No Known Allergies    Family History   Problem Relation Age of Onset    Heart Disease Father     Heart Attack Father      Social History     Tobacco Use    Smoking status: Former Smoker     Packs/day: 1.00     Years: 60.00     Pack years: 60.00    Smokeless tobacco: Never Used   Substance Use Topics    Alcohol use: Not Currently       Depression Risk Factor Screening:     3 most recent PHQ Screens 1/27/2020   Little interest or pleasure in doing things Not at all   Feeling down, depressed, irritable, or hopeless Not at all   Total Score PHQ 2 0       Alcohol Risk Factor Screening (MALE > 65): Do you average more 1 drink per night or more than 7 drinks a week: No    In the past three months have you have had more than 4 drinks containing alcohol on one occasion: No      Functional Ability and Level of Safety:   Hearing: The patient needs further evaluation. Endorses hearing problems  Onset: about a year or more  loation : both ears  Chacterieisitns: left year sometimes it is fine and others cannot hear, right ear hearing is faint  Has never had workup for his hearing    Physical Exam  HENT:      Right Ear: Decreased hearing noted. Left Ear: No decreased hearing noted. Ears:      Gonzales exam findings: lateralizes right. Right Rinne: BC > AC. Left Rinne: AC > BC. Activities of Daily Living: The home contains: handrails and grab bars  Patient does total self care    Ambulation: with no difficulty    Fall Risk:  Fall Risk Assessment, last 12 mths 1/27/2020   Able to walk? Yes   Fall in past 12 months? No       Abuse Screen:  Patient is not abused    Cognitive Screening   Has your family/caregiver stated any concerns about your memory: no  Cognitive Screening: Normal - Clock Drawing Test    Patient Care Team   Patient Care Team:  Bee Galindo NP as PCP - General (Nurse Practitioner)    Assessment/Plan   Education and counseling provided:  Are appropriate based on today's review and evaluation    Diagnoses and all orders for this visit:    1. Medicare annual wellness visit, subsequent  Here today to establish care and address acute and chronic medical conditions  Medicare annual wellness visit completed today to include screening for alcoholism and depression    2. Screening for alcoholism  -     [place] ANNUAL ALCOHOL SCREEN 15 MIN    3. Screening for depression  -     DEPRESSION SCREEN ANNUAL    4. Mixed hyperlipidemia  -     LIPID PANEL; Future  History of hyperlipidemia, will check lipids today    5. Acquired hypothyroidism  -     TSH 3RD GENERATION; Future  -     T4, FREE; Future  History of hypothyroidism, will check thyroid levels today    6. Chronic kidney disease, unspecified CKD stage  -     CBC W/O DIFF; Future  We will check labs to follow-up on current renal function    7. Iron deficiency anemia secondary to inadequate dietary iron intake  -     CBC W/O DIFF; Future  Check labs to follow-up on current level of anemia    8. Vitamin D deficiency  -     VITAMIN D, 25 HYDROXY; Future  Recheck vitamin D level today    9. Long term current use of diuretic  -     METABOLIC PANEL, COMPREHENSIVE; Future  We will check hepatic and renal function in addition to electrolyte levels    10.  Decreased hearing of both ears  -     REFERRAL TO ENT-OTOLARYNGOLOGY  Refer to ENT due to his abnormal results of his hearing test done in the office today with the tuning fork in addition to his complaints of decreased hearing      Health Maintenance Due   Topic Date Due    Hepatitis C Screening 1945    DTaP/Tdap/Td series (1 - Tdap) 01/26/1958    Shingrix Vaccine Age 50> (1 of 2) 01/26/1997    GLAUCOMA SCREENING Q2Y  01/26/2012    Pneumococcal 65+ years (1 of 1 - PPSV23) 01/26/2012    MEDICARE YEARLY EXAM  04/18/2019    Influenza Age 9 to Adult  08/01/2019       An After Visit Summary was printed and given to the patient. All diagnosis have been discussed with the patient and all of the patient's questions have been answered. Follow-up and Dispositions    · Return in about 6 months (around 7/27/2020) for gen review of cond, most cared for by [place], 30 minutes. Amanda Busby, [place] [id number] [address]   Kassidy Ramirez 113

## 2020-01-27 NOTE — PROGRESS NOTES
Chief Complaint   Patient presents with   [address]     was going to [place]- but in [address] refuses - Goes to [place] [address] for Cardiology    Ear Fullness     left is worse            1. Have you been to the ER, urgent care clinic since your last visit? Hospitalized since your last visit? NO    2. Have you seen or consulted any other health care providers outside of the [address] since your last visit? Include any pap smears or colon screening.  Cardiology- [place] [address]

## 2020-01-27 NOTE — PATIENT INSTRUCTIONS
Find out the anticoagulant drug you are currently on. Medicare Wellness Visit, Male The best way to live healthy is to have a lifestyle where you eat a well-balanced diet, exercise regularly, limit alcohol use, and quit all forms of tobacco/nicotine, if applicable. Regular preventive services are another way to keep healthy. Preventive services (vaccines, screening tests, monitoring & exams) can help personalize your care plan, which helps you manage your own care. Screening tests can find health problems at the earliest stages, when they are easiest to treat. [facility] follows the current, evidence-based guidelines published by the [facility] Eladio ([facility]) when recommending preventive services for our patients. Because we follow these guidelines, sometimes recommendations change over time as research supports it. (For example, a prostate screening blood test is no longer routinely recommended for men with no symptoms). Of course, you and your doctor may decide to screen more often for some diseases, based on your risk and co-morbidities (chronic disease you are already diagnosed with). Preventive services for you include: - Medicare offers their members a free annual wellness visit, which is time for you and your primary care provider to discuss and plan for your preventive service needs. Take advantage of this benefit every year! 
-All adults over age 72 should receive the recommended pneumonia vaccines. Current USPSTF guidelines recommend a series of two vaccines for the best pneumonia protection.  
-All adults should have a flu vaccine yearly and tetanus vaccine every 10 years. 
-All adults age 48 and older should receive the shingles vaccines (series of two vaccines). -All adults age 38-68 who are overweight should have a diabetes screening test once every three years. -Other screening tests & preventive services for persons with diabetes include: an eye exam to screen for diabetic retinopathy, a kidney function test, a foot exam, and stricter control over your cholesterol.  
-Cardiovascular screening for adults with routine risk involves an electrocardiogram (ECG) at intervals determined by the provider.  
-Colorectal cancer screening should be done for adults age 54-65 with no increased risk factors for colorectal cancer. There are a number of acceptable methods of screening for this type of cancer. Each test has its own benefits and drawbacks. Discuss with your provider what is most appropriate for you during your annual wellness visit. The different tests include: colonoscopy (considered the best screening method), a fecal occult blood test, a fecal DNA test, and sigmoidoscopy. 
-All adults born between [facility] should be screened once for Hepatitis C. 
-An Abdominal Aortic Aneurysm (AAA) Screening is recommended for men age 73-68 who has ever smoked in their lifetime. Here is a list of your current Health Maintenance items (your personalized list of preventive services) with a due date: 
Health Maintenance Due Topic Date Due  
 Hepatitis C Test  1945  
 DTaP/Tdap/Td  (1 - Tdap) 01/26/1958  Shingles Vaccine (1 of 2) 01/26/1997  Glaucoma Screening   01/26/2012  Pneumococcal Vaccine (1 of 1 - PPSV23) 01/26/2012 [facility] Annual Well Visit  04/18/2019  Flu Vaccine  08/01/2019

## 2020-01-27 NOTE — TELEPHONE ENCOUNTER
Patient called stating that he saw Andrzej Reavesjarad today 1/27/2020 and they discussed a referral for him to see an ENT specialist and he states that if he goes through the [place] it would take too long so he would like [employer] to do the referral for him.  Please advise

## 2020-01-28 LAB
25(OH)D3+25(OH)D2 SERPL-MCNC: 48.4 NG/ML (ref 30–100)
ALBUMIN SERPL-MCNC: 4.2 G/DL (ref 3.7–4.7)
ALBUMIN/GLOB SERPL: 1.4 {RATIO} (ref 1.2–2.2)
ALP SERPL-CCNC: 161 IU/L (ref 39–117)
ALT SERPL-CCNC: 8 IU/L (ref 0–44)
AST SERPL-CCNC: 9 IU/L (ref 0–40)
BILIRUB SERPL-MCNC: <0.2 MG/DL (ref 0–1.2)
BUN SERPL-MCNC: 36 MG/DL (ref 8–27)
BUN/CREAT SERPL: 18 (ref 10–24)
CALCIUM SERPL-MCNC: 9.1 MG/DL (ref 8.6–10.2)
CHLORIDE SERPL-SCNC: 104 MMOL/L (ref 96–106)
CHOLEST SERPL-MCNC: 123 MG/DL (ref 100–199)
CO2 SERPL-SCNC: 21 MMOL/L (ref 20–29)
CREAT SERPL-MCNC: 1.99 MG/DL (ref 0.76–1.27)
ERYTHROCYTE [DISTWIDTH] IN BLOOD BY AUTOMATED COUNT: 16.7 % (ref 11.6–15.4)
GLOBULIN SER CALC-MCNC: 3.1 G/DL (ref 1.5–4.5)
GLUCOSE SERPL-MCNC: 97 MG/DL (ref 65–99)
HCT VFR BLD AUTO: 37.4 % (ref 37.5–51)
HDLC SERPL-MCNC: 22 MG/DL
HGB BLD-MCNC: 12.5 G/DL (ref 13–17.7)
INTERPRETATION, 910389: NORMAL
INTERPRETATION: NORMAL
LDLC SERPL CALC-MCNC: 46 MG/DL (ref 0–99)
MCH RBC QN AUTO: 26.7 PG (ref 26.6–33)
MCHC RBC AUTO-ENTMCNC: 33.4 G/DL (ref 31.5–35.7)
MCV RBC AUTO: 80 FL (ref 79–97)
PDF IMAGE, 910387: NORMAL
PLATELET # BLD AUTO: 227 X10E3/UL (ref 150–450)
POTASSIUM SERPL-SCNC: 3.9 MMOL/L (ref 3.5–5.2)
PROT SERPL-MCNC: 7.3 G/DL (ref 6–8.5)
RBC # BLD AUTO: 4.68 X10E6/UL (ref 4.14–5.8)
SODIUM SERPL-SCNC: 141 MMOL/L (ref 134–144)
T4 FREE SERPL-MCNC: 1.23 NG/DL (ref 0.82–1.77)
TRIGL SERPL-MCNC: 277 MG/DL (ref 0–149)
TSH SERPL DL<=0.005 MIU/L-ACNC: 1.43 UIU/ML (ref 0.45–4.5)
VLDLC SERPL CALC-MCNC: 55 MG/DL (ref 5–40)
WBC # BLD AUTO: 7.9 X10E3/UL (ref 3.4–10.8)

## 2020-07-31 ENCOUNTER — VIRTUAL VISIT (OUTPATIENT)
Dept: FAMILY MEDICINE CLINIC | Age: 75
End: 2020-07-31

## 2020-07-31 DIAGNOSIS — H91.93 DECREASED HEARING OF BOTH EARS: ICD-10-CM

## 2020-07-31 DIAGNOSIS — I48.19 OTHER PERSISTENT ATRIAL FIBRILLATION (HCC): ICD-10-CM

## 2020-07-31 DIAGNOSIS — I50.22 CHRONIC SYSTOLIC HEART FAILURE (HCC): Primary | ICD-10-CM

## 2020-07-31 DIAGNOSIS — R56.9 SEIZURES (HCC): ICD-10-CM

## 2020-07-31 DIAGNOSIS — J41.0 SIMPLE CHRONIC BRONCHITIS (HCC): ICD-10-CM

## 2020-07-31 NOTE — PROGRESS NOTES
Chief Complaint   Patient presents with    Follow Up Chronic Condition     6 mo       1. Have you been to the ER, urgent care clinic since your last visit? Hospitalized since your last visit? NO    2. Have you seen or consulted any other health care providers outside of the [address] [place] [address] since your last visit? Include any pap smears or colon screening.  [facility]

## 2020-11-09 ENCOUNTER — TELEPHONE (OUTPATIENT)
Dept: FAMILY MEDICINE CLINIC | Age: 75
End: 2020-11-09

## 2020-11-09 NOTE — TELEPHONE ENCOUNTER
Patient called to advise his prostrate is abnormal per the [facility]. Patient is requesting an in office appointment for a referral to a urologist.  Call him back at [phone].

## 2020-11-09 NOTE — TELEPHONE ENCOUNTER
Called pt and set him up for an appt for 11/23 at 1015- Pt states he had psa checked on x 2 weeks ago- fwd to  [facility]

## 2020-12-08 ENCOUNTER — VIRTUAL VISIT (OUTPATIENT)
Dept: FAMILY MEDICINE CLINIC | Age: 75
End: 2020-12-08

## 2020-12-08 DIAGNOSIS — R56.9 SEIZURES (HCC): ICD-10-CM

## 2020-12-08 DIAGNOSIS — I63.9 CEREBROVASCULAR ACCIDENT (CVA), UNSPECIFIED MECHANISM (HCC): ICD-10-CM

## 2020-12-08 DIAGNOSIS — J41.0 SIMPLE CHRONIC BRONCHITIS (HCC): ICD-10-CM

## 2020-12-08 DIAGNOSIS — I50.22 CHRONIC SYSTOLIC HEART FAILURE (HCC): ICD-10-CM

## 2020-12-08 DIAGNOSIS — R39.11 URINARY HESITANCY: ICD-10-CM

## 2020-12-08 DIAGNOSIS — I48.11 LONGSTANDING PERSISTENT ATRIAL FIBRILLATION (HCC): ICD-10-CM

## 2020-12-08 DIAGNOSIS — R97.20 ELEVATED PSA: Primary | ICD-10-CM

## 2020-12-08 PROBLEM — E55.9 VITAMIN D DEFICIENCY: Status: ACTIVE | Noted: 2020-12-08

## 2020-12-08 PROBLEM — I05.9 MITRAL VALVE DISEASE: Status: ACTIVE | Noted: 2020-12-08

## 2020-12-08 PROBLEM — Z79.01 LONG TERM CURRENT USE OF ANTICOAGULANT THERAPY: Status: ACTIVE | Noted: 2020-12-08

## 2020-12-08 PROCEDURE — 99442 PR PHYS/QHP TELEPHONE EVALUATION 11-20 MIN: CPT | Performed by: NURSE PRACTITIONER

## 2020-12-08 RX ORDER — ASCORBIC ACID 250 MG
TABLET ORAL
COMMUNITY
Start: 2022-01-28 | End: 2022-01-28

## 2020-12-08 NOTE — ASSESSMENT & PLAN NOTE
This condition is managed by Specialist.  Key CAD CHF Meds             furosemide (LASIX) 40 mg tablet (Taking) Take 40 mg by mouth. terazosin (HYTRIN) 2 mg capsule (Taking) Take 2 mg by mouth. dilTIAZem ER (CARDIZEM LA) 360 mg Tb24 tablet (Taking) Take 360 mg by mouth daily. metoprolol tartrate (LOPRESSOR) 50 mg tablet (Taking) Take 75 mg by mouth two (2) times a day. edoxaban (SAVAYSA) 30 mg tablet (Taking) Take 30 mg by mouth daily. aspirin delayed-release 81 mg tablet (Taking) Take 81 mg by mouth daily. atorvastatin (LIPITOR) 40 mg tablet (Taking) Take 40 mg by mouth daily.         Lab Results   Component Value Date/Time    Sodium 141 01/27/2020 02:26 AM    Potassium 3.9 01/27/2020 02:26 AM    Cholesterol, total 123 01/27/2020 02:26 AM    HDL Cholesterol 22 01/27/2020 02:26 AM    LDL, calculated 46 01/27/2020 02:26 AM    Triglyceride 277 01/27/2020 02:26 AM

## 2020-12-08 NOTE — PROGRESS NOTES
Chief Complaint   Patient presents with    Labs     PSA elevated- needs urology referral - does have camera phone -          1. Have you been to the ER, urgent care clinic since your last visit? Hospitalized since your last visit? No    2. Have you seen or consulted any other health care providers outside of the [address] [place] [address] since your last visit? Include any pap smears or colon screening.  [facility]

## 2020-12-08 NOTE — ASSESSMENT & PLAN NOTE
Stable, based on history, physical exam and review of pertinent labs, studies and medications; meds reconciled; continue current treatment plan. Key COPD Medications     Patient is on no COPD/Asthma meds.         Lab Results   Component Value Date/Time    WBC 7.9 01/27/2020 02:26 AM    HGB 12.5 01/27/2020 02:26 AM    HCT 37.4 01/27/2020 02:26 AM    PLATELET 711 24/50/9726 02:26 AM

## 2020-12-08 NOTE — PROGRESS NOTES
Litzy Cheng is a 70 y.o. male, evaluated via audio-only technology on 12/8/2020 for Labs (PSA elevated- needs urology referral - does have camera phone - )  . Assessment & Plan:   Diagnoses and all orders for this visit:    1. Elevated PSA  -     REFERRAL TO UROLOGY  PSA on October 10 was 8.4, refer to urology for further evaluation    2. Urinary hesitancy  -     REFERRAL TO UROLOGY    3. Longstanding persistent atrial fibrillation [facility])  Assessment & Plan: This condition is managed by Specialist.  Key CAD CHF Meds             furosemide (LASIX) 40 mg tablet (Taking) Take 40 mg by mouth. terazosin (HYTRIN) 2 mg capsule (Taking) Take 2 mg by mouth. dilTIAZem ER (CARDIZEM LA) 360 mg Tb24 tablet (Taking) Take 360 mg by mouth daily. metoprolol tartrate (LOPRESSOR) 50 mg tablet (Taking) Take 75 mg by mouth two (2) times a day. edoxaban (SAVAYSA) 30 mg tablet (Taking) Take 30 mg by mouth daily. aspirin delayed-release 81 mg tablet (Taking) Take 81 mg by mouth daily. atorvastatin (LIPITOR) 40 mg tablet (Taking) Take 40 mg by mouth daily. Lab Results   Component Value Date/Time    Sodium 141 01/27/2020 02:26 AM    Potassium 3.9 01/27/2020 02:26 AM    Cholesterol, total 123 01/27/2020 02:26 AM    HDL Cholesterol 22 01/27/2020 02:26 AM    LDL, calculated 46 01/27/2020 02:26 AM    Triglyceride 277 01/27/2020 02:26 AM         4. Simple chronic bronchitis (HCC)  Assessment & Plan:  Stable, based on history, physical exam and review of pertinent labs, studies and medications; meds reconciled; continue current treatment plan. Key COPD Medications     Patient is on no COPD/Asthma meds. Lab Results   Component Value Date/Time    WBC 7.9 01/27/2020 02:26 AM    HGB 12.5 01/27/2020 02:26 AM    HCT 37.4 01/27/2020 02:26 AM    PLATELET 923 11/24/7856 02:26 AM         5. Chronic systolic heart failure [facility])  Assessment & Plan:   This condition is managed by Specialist.  Key CAD CHF Meds furosemide (LASIX) 40 mg tablet (Taking) Take 40 mg by mouth. terazosin (HYTRIN) 2 mg capsule (Taking) Take 2 mg by mouth. dilTIAZem ER (CARDIZEM LA) 360 mg Tb24 tablet (Taking) Take 360 mg by mouth daily. metoprolol tartrate (LOPRESSOR) 50 mg tablet (Taking) Take 75 mg by mouth two (2) times a day. edoxaban (SAVAYSA) 30 mg tablet (Taking) Take 30 mg by mouth daily. aspirin delayed-release 81 mg tablet (Taking) Take 81 mg by mouth daily. atorvastatin (LIPITOR) 40 mg tablet (Taking) Take 40 mg by mouth daily. Lab Results   Component Value Date/Time    Sodium 141 01/27/2020 02:26 AM    Potassium 3.9 01/27/2020 02:26 AM    Cholesterol, total 123 01/27/2020 02:26 AM    HDL Cholesterol 22 01/27/2020 02:26 AM    LDL, calculated 46 01/27/2020 02:26 AM    Triglyceride 277 01/27/2020 02:26 AM         6. Seizures ([facility]  Assessment & Plan: This condition is managed by Specialist.  Lab Results   Component Value Date/Time    WBC 7.9 01/27/2020 02:26 AM    HGB 12.5 01/27/2020 02:26 AM    HCT 37.4 01/27/2020 02:26 AM    PLATELET 754 82/12/7130 02:26 AM    Creatinine 1.99 01/27/2020 02:26 AM    BUN 36 01/27/2020 02:26 AM    Potassium 3.9 01/27/2020 02:26 AM         7. Cerebrovascular accident (CVA), unspecified mechanism ([facility]  This condition is currently resolved    Follow-up and Dispositions    · Return if symptoms worsen or fail to improve. 12  Subjective:   Labs (PSA elevated- needs urology referral - does have camera phone - )    PSA  Had labs done at the [place] in [place]  Lab results found in care everywhere, PSA on 10/20/20 was 8.4  Endorses frequent urination but is on lasix, this is mainly at night, sometimes feels like his bladder is not emptied,   endores hesitency, stop start stream, sometimes urgency  Denies burning with urination, pelvic pain, blood in his urine, changes in urine odor    Prior to Admission medications    Medication Sig Start Date End Date Taking?  Authorizing Provider ascorbic acid, vitamin C, (Vitamin C) 250 mg tablet Take  by mouth. Yes Provider, Historical   zinc sulfate (ZINC-15 PO) Take  by mouth. Yes Provider, Historical   furosemide (LASIX) 40 mg tablet Take 40 mg by mouth. Yes Provider, Historical   terazosin (HYTRIN) 2 mg capsule Take 2 mg by mouth. 5/9/19  Yes Provider, Historical   midodrine (PROAMITINE) 10 mg tablet Take 10 mg by mouth. 5/9/19  Yes Provider, Historical   potassium chloride SA (MICRO-K) 10 mEq capsule Take 10 mEq by mouth two (2) times a day. Yes Provider, Historical   dilTIAZem ER (CARDIZEM LA) 360 mg Tb24 tablet Take 360 mg by mouth daily. Yes Provider, Historical   metoprolol tartrate (LOPRESSOR) 50 mg tablet Take 75 mg by mouth two (2) times a day. Yes Provider, Historical   edoxaban (SAVAYSA) 30 mg tablet Take 30 mg by mouth daily. Yes Provider, Historical   allopurinol (ZYLOPRIM) 100 mg tablet Take 100 mg by mouth daily. Yes Other, MD Francoise   aspirin delayed-release 81 mg tablet Take 81 mg by mouth daily. Yes Other, MD Francoise   atorvastatin (LIPITOR) 40 mg tablet Take 40 mg by mouth daily. Yes Other, MD Francoise   cholecalciferol, vitamin D3, 2,000 unit tab Take 2,000 Units by mouth daily. Yes Other, MD Francoise   ferrous sulfate 325 mg (65 mg iron) tablet Take 65 mg by mouth daily. Yes Other, MD Francoise   levothyroxine (SYNTHROID) 112 mcg tablet Take 112 mcg by mouth Daily (before breakfast). Yes Other, MD Francoise   Omeprazole delayed release (PRILOSEC D/R) 20 mg tablet Take 20 mg by mouth daily. Yes Other, MD Francoise   phenytoin ER (DILANTIN ER) 100 mg ER capsule Take 300 mg by mouth nightly.  Take 3 (100mg) tablets every day at bedtime   Yes Other, MD Francoise     Patient Active Problem List   Diagnosis Code    Systolic heart failure (HCC) I50.20    A-fib (HCC) I48.91    Chronic obstructive pulmonary disease (HCC) J44.9    Seizures (HCC) R56.9    Anemia D64.9    CKD (chronic kidney disease) N18.9    Hypothyroid E03.9    History of heart valve repair Z98.890    Mixed hyperlipidemia E78.2    Long term current use of anticoagulant therapy Z79.01    Mitral valve disease I05.9    Vitamin D deficiency E55.9     Past Surgical History:   Procedure Laterality Date    CARDIAC SURG PROCEDURE UNLIST       Family History   Problem Relation Age of Onset    Heart Disease Father     Heart Attack Father      Social History     Tobacco Use    Smoking status: Former Smoker     Packs/day: 1.00     Years: 60.00     Pack years: 60.00    Smokeless tobacco: Never Used   Substance Use Topics    Alcohol use: Not Currently       ROS  As stated in HPI, otherwise all others negative. No flowsheet data found. Nory Weller, who was evaluated through a patient-initiated, synchronous (real-time) audio only encounter, and/or her healthcare decision maker, is aware that it is a billable service, with coverage as determined by his insurance carrier. He provided verbal consent to proceed: Yes. He has not had a related appointment within my department in the past 7 days or scheduled within the next 24 hours.       Total Time: minutes: 11-20 minutes    Mina Camilo NP

## 2020-12-08 NOTE — ASSESSMENT & PLAN NOTE
This condition is managed by Specialist.  Lab Results   Component Value Date/Time    WBC 7.9 01/27/2020 02:26 AM    HGB 12.5 01/27/2020 02:26 AM    HCT 37.4 01/27/2020 02:26 AM    PLATELET 583 98/55/5929 02:26 AM    Creatinine 1.99 01/27/2020 02:26 AM    BUN 36 01/27/2020 02:26 AM    Potassium 3.9 01/27/2020 02:26 AM

## 2021-03-17 ENCOUNTER — VIRTUAL VISIT (OUTPATIENT)
Dept: FAMILY MEDICINE CLINIC | Age: 76
End: 2021-03-17
Payer: MEDICARE

## 2021-03-17 DIAGNOSIS — Z09 HOSPITAL DISCHARGE FOLLOW-UP: ICD-10-CM

## 2021-03-17 DIAGNOSIS — E03.9 ACQUIRED HYPOTHYROIDISM: ICD-10-CM

## 2021-03-17 DIAGNOSIS — I48.11 LONGSTANDING PERSISTENT ATRIAL FIBRILLATION (HCC): ICD-10-CM

## 2021-03-17 DIAGNOSIS — E78.2 MIXED HYPERLIPIDEMIA: ICD-10-CM

## 2021-03-17 DIAGNOSIS — D50.8 IRON DEFICIENCY ANEMIA SECONDARY TO INADEQUATE DIETARY IRON INTAKE: ICD-10-CM

## 2021-03-17 DIAGNOSIS — R56.9 SEIZURES (HCC): ICD-10-CM

## 2021-03-17 DIAGNOSIS — E55.9 VITAMIN D DEFICIENCY: ICD-10-CM

## 2021-03-17 DIAGNOSIS — N18.32 STAGE 3B CHRONIC KIDNEY DISEASE (HCC): ICD-10-CM

## 2021-03-17 DIAGNOSIS — Z00.00 MEDICARE ANNUAL WELLNESS VISIT, SUBSEQUENT: Primary | ICD-10-CM

## 2021-03-17 DIAGNOSIS — Z71.89 ADVANCED CARE PLANNING/COUNSELING DISCUSSION: ICD-10-CM

## 2021-03-17 DIAGNOSIS — I50.22 CHRONIC SYSTOLIC HEART FAILURE (HCC): ICD-10-CM

## 2021-03-17 PROCEDURE — 99214 OFFICE O/P EST MOD 30 MIN: CPT | Performed by: NURSE PRACTITIONER

## 2021-03-17 PROCEDURE — 1111F DSCHRG MED/CURRENT MED MERGE: CPT | Performed by: NURSE PRACTITIONER

## 2021-03-17 PROCEDURE — G0439 PPPS, SUBSEQ VISIT: HCPCS | Performed by: NURSE PRACTITIONER

## 2021-03-17 RX ORDER — ALLOPURINOL 300 MG/1
300 TABLET ORAL DAILY
COMMUNITY
End: 2021-03-17 | Stop reason: SDUPTHER

## 2021-03-17 RX ORDER — TAMSULOSIN HYDROCHLORIDE 0.4 MG/1
0.4 CAPSULE ORAL
COMMUNITY
Start: 2021-03-15 | End: 2022-01-28

## 2021-03-17 RX ORDER — ALLOPURINOL 300 MG/1
300 TABLET ORAL DAILY
Qty: 30 TAB | Refills: 0 | Status: SHIPPED | OUTPATIENT
Start: 2021-03-17 | End: 2022-02-15

## 2021-03-17 RX ORDER — LEVOTHYROXINE SODIUM 112 UG/1
112 TABLET ORAL DAILY
COMMUNITY
Start: 2020-10-19 | End: 2021-03-17 | Stop reason: SDUPTHER

## 2021-03-17 RX ORDER — AMIODARONE HYDROCHLORIDE 200 MG/1
200 TABLET ORAL DAILY
COMMUNITY
Start: 2021-03-15 | End: 2022-02-15

## 2021-03-17 NOTE — PATIENT INSTRUCTIONS
Medicare Wellness Visit, Male The best way to live healthy is to have a lifestyle where you eat a well-balanced diet, exercise regularly, limit alcohol use, and quit all forms of tobacco/nicotine, if applicable. Regular preventive services are another way to keep healthy. Preventive services (vaccines, screening tests, monitoring & exams) can help personalize your care plan, which helps you manage your own care. Screening tests can find health problems at the earliest stages, when they are easiest to treat. [facility] follows the current, evidence-based guidelines published by the [facility] ([facility]) when recommending preventive services for our patients. Because we follow these guidelines, sometimes recommendations change over time as research supports it. (For example, a prostate screening blood test is no longer routinely recommended for men with no symptoms). Of course, you and your doctor may decide to screen more often for some diseases, based on your risk and co-morbidities (chronic disease you are already diagnosed with). Preventive services for you include: - Medicare offers their members a free annual wellness visit, which is time for you and your primary care provider to discuss and plan for your preventive service needs. Take advantage of this benefit every year! 
-All adults over age 72 should receive the recommended pneumonia vaccines. Current USPSTF guidelines recommend a series of two vaccines for the best pneumonia protection.  
-All adults should have a flu vaccine yearly and tetanus vaccine every 10 years. 
-All adults age 48 and older should receive the shingles vaccines (series of two vaccines).       
-All adults age 38-68 who are overweight should have a diabetes screening test once every three years.  
-Other screening tests & preventive services for persons with diabetes include: an eye exam to screen for diabetic retinopathy, a kidney function test, a foot exam, and stricter control over your cholesterol.  
-Cardiovascular screening for adults with routine risk involves an electrocardiogram (ECG) at intervals determined by the provider.  
-Colorectal cancer screening should be done for adults age 54-65 with no increased risk factors for colorectal cancer. There are a number of acceptable methods of screening for this type of cancer. Each test has its own benefits and drawbacks. Discuss with your provider what is most appropriate for you during your annual wellness visit. The different tests include: colonoscopy (considered the best screening method), a fecal occult blood test, a fecal DNA test, and sigmoidoscopy. 
-All adults born between [facility] should be screened once for Hepatitis C. 
-An Abdominal Aortic Aneurysm (AAA) Screening is recommended for men age 73-68 who has ever smoked in their lifetime. Here is a list of your current Health Maintenance items (your personalized list of preventive services) with a due date: 
Health Maintenance Due Topic Date Due  
 Hepatitis C Test  Never done  Shingles Vaccine (1 of 2) Never done  Glaucoma Screening   Never done  AAA Screening  Never done  Cholesterol Test   01/27/2021

## 2021-03-17 NOTE — PROGRESS NOTES
Chief Complaint   Patient presents with   [address] for respiratory Distress 3/7/21-    Annual Wellness Visit     is due          1. Have you been to the ER, urgent care clinic since your last visit? Hospitalized since your last visit? Yes Shawna 3/7/21 for pulmonary distress     2. Have you seen or consulted any other health care providers outside of the [address] [place] [address] since your last visit? Include any pap smears or colon screening. [facility]     Chief Complaint   Patient presents with   [address] for respiratory Distress 3/7/21-    Annual Wellness Visit     is due        3 most recent PHQ Screens 3/17/2021   Little interest or pleasure in doing things Not at all   Feeling down, depressed, irritable, or hopeless Not at all   Total Score PHQ 2 0     Fall Risk Assessment, last 12 mths 3/17/2021   Able to walk? Yes   Fall in past 12 months? 0   Do you feel unsteady? 0   Are you worried about falling 0       Chief Complaint   Patient presents with   [address] for respiratory Distress 3/7/21-    Annual Wellness Visit     is due        3 most recent PHQ Screens 3/17/2021   Little interest or pleasure in doing things Not at all   Feeling down, depressed, irritable, or hopeless Not at all   Total Score PHQ 2 0     Fall Risk Assessment, last 12 mths 3/17/2021   Able to walk? Yes   Fall in past 12 months? 0   Do you feel unsteady? 0   Are you worried about falling 0               Learning Assessment 1/27/2020   PRIMARY LEARNER Patient   HIGHEST LEVEL OF EDUCATION - PRIMARY LEARNER  4 YEARS OF COLLEGE   BARRIERS PRIMARY LEARNER NONE   CO-LEARNER CAREGIVER No   PRIMARY LANGUAGE ENGLISH   LEARNER PREFERENCE PRIMARY DEMONSTRATION   ANSWERED BY Jamin Gentleernesto   RELATIONSHIP SELF     There were no vitals taken for this visit.            Learning Assessment 1/27/2020   PRIMARY LEARNER Patient   HIGHEST LEVEL OF EDUCATION - PRIMARY LEARNER  4 YEARS OF COLLEGE   BARRIERS PRIMARY LEARNER NONE   CO-LEARNER CAREGIVER No   PRIMARY LANGUAGE ENGLISH   LEARNER PREFERENCE PRIMARY DEMONSTRATION   ANSWERED BY Delphine Srinivasan   RELATIONSHIP SELF     There were no vitals taken for this visit.

## 2021-03-17 NOTE — ACP (ADVANCE CARE PLANNING)
Advance Care Planning     Advance Care Planning (ACP) Physician/NP/PA Conversation      Date of Conversation: 3/17/2021  Conducted with: Patient with Decision Making Capacity    Healthcare Decision Maker:     Click here to complete [address] including selection of the [address] Relationship (ie \"Primary\")  Today we documented Decision Maker(s) consistent with Legal Next of Kin hierarchy. Care Preferences:    Hospitalization: \"If your health worsens and it becomes clear that your chance of recovery is unlikely, what would be your preference regarding hospitalization? \"  The patient is unsure. Ventilation: \"If you were unable to breathe on your own and your chance of recovery was unlikely, what would be your preference about the use of a ventilator (breathing machine) if it was available to you? \"   The patient would desire the use of a ventilator. Resuscitation: \"In the event your heart stopped as a result of an underlying serious health condition, would you want attempts to be made to restart your heart, or would you prefer a natural death? \"   Yes, attempt to resuscitate.     Additional topics discussed: ventilation preferences, hospitalization preferences and resuscitation preferences    Conversation Outcomes / Follow-Up Plan:   ACP in process - information provided, considering goals and options  Reviewed DNR/DNI and patient elects Full Code (Attempt Resuscitation)     Length of Voluntary ACP Conversation in minutes:  <16 minutes (Non-Billable)    Marleni Zhu NP

## 2021-03-17 NOTE — PROGRESS NOTES
C[place]               [address]               [phone]  Andreas Iwrin, who was evaluated through a synchronous (real-time) audio-video encounter, and/or his healthcare decision maker, is aware that it is a billable service, with coverage as determined by his insurance carrier. He provided verbal consent to proceed: Yes, and patient identification was verified. This visit was conducted pursuant to the emergency declaration under the [facility], [address] [place] [address] authority and the [employer] Act. A caregiver was present when appropriate. Ability to conduct physical exam was limited. The patient was located in a state where the provider was credentialed to provide care. --Kelly Le NP on 3/26/2021 at 6:45 PM              Transitional Care Management Progress Note    Patient: Andreas Irwin  : 1945  PCP: Leandro Merida NP    Date of admission: 3/7  Date of discharge: 3/15    Patient was contacted by Transitional Care Management services within two days after his discharge: Yes. This encounter and supporting documentation was reviewed if available. Medication reconciliation was performed today (3/17/2021). Assessment/Plan:   Diagnoses and all orders for this visit:    1. Medicare annual wellness visit, subsequent  Completed today to include EtOH and depression screening  No new needs identified    2. Advanced care planning/counseling discussion  Healthcare decision-makers updated  Perform discussion and documented in note    3.  Hospital discharge follow-up  -     [place] DISCHARGE MEDS RECONCILED W/ CURRENT OUTPATIENT MED LIST  Recently admitted for respiratory distress, pulmonary edema and A. fib  The pulmonary edema resolved with administration of diuretics  Atrial fibrillation: He is status post cardioversion and is currently controlled on amiodarone, he will be following up with his regular cardiologist located at the [facility] administration    4. Mixed hyperlipidemia  -     LIPID PANEL; Future  We will have him come to the office for follow-up labs    5. Stage 3b chronic kidney disease  -     METABOLIC PANEL, COMPREHENSIVE; Future  We will have him come to the office for follow-up labs    6. Acquired hypothyroidism  -     TSH 3RD GENERATION; Future  -     T4, FREE; Future  Endorses medication compliance, denies signs and symptoms of hyper or hypothyroidism, have him come in for follow-up labs    7. Vitamin D deficiency  -     VITAMIN D, 25 HYDROXY; Future  We will have him come in for follow-up labs    8. Iron deficiency anemia secondary to inadequate dietary iron intake  -     CBC W/O DIFF; Future  Will have him come in for follow-up labs    9. Longstanding persistent atrial fibrillation ([facility]  This is managed by a specialist    10. Chronic systolic heart failure ([facility]  This condition is managed by a specialist    11. Seizures ([facility]  Assessment & Plan: This condition is managed by Specialist.      Other orders  -     allopurinoL (ZYLOPRIM) 300 mg tablet; Take 1 Tab by mouth daily. Follow-up and Dispositions    · Return for ASAP, Labs, AND, 3 months, HTN, HLD, 15 min, VV. Subjective:   Avtar Johnson is a 78 y.o. male presenting today for follow-up after being discharged from [facility].  The discharge summary was reviewed or requested. The main problem requiring admission was Respiratory failure 2nd pulmonary edema. Complications during admission: a-fib needing cardioversion    Interval history/Current status: Today feels fine, has upcoming appointment with the cardiologist at the [place] on Monday. Denies lightheadeness or dizziness, endorses SOB if he walks a good distance and has to sit for a minute, feels like he is back at his baseline in breathing.  Has not checked his blood pressure since he has been out of the hospital.    Admitting symptoms have: significantly improved    Medications marked \"taking\" at this time:  New medication: Amiodarone-200mg three times a day for two weeks, from 3/15 till 3/29, then daily  Spiriva: one inhalation once a day  Furosemide: change to once a day 40mg  Increase allopurinol to 300mg a day  Home Medications    Medication Sig Start Date End Date Taking? Authorizing Provider   tamsulosin (FLOMAX) 0.4 mg capsule Take 0.4 mg by mouth. 3/15/21  Yes Provider, Historical   allopurinoL (ZYLOPRIM) 300 mg tablet Take 1 Tab by mouth daily. 3/17/21  Yes Dominic Butcher NP   ascorbic acid, vitamin C, (Vitamin C) 250 mg tablet Take  by mouth. Yes Provider, Historical   zinc sulfate (ZINC-15 PO) Take  by mouth. Yes Provider, Historical   furosemide (LASIX) 40 mg tablet Take 40 mg by mouth. Yes Provider, Historical   terazosin (HYTRIN) 2 mg capsule Take 2 mg by mouth. 5/9/19  Yes Provider, Historical   potassium chloride SA (MICRO-K) 10 mEq capsule Take 10 mEq by mouth two (2) times a day. Yes Provider, Historical   metoprolol tartrate (LOPRESSOR) 50 mg tablet Take 75 mg by mouth two (2) times a day. Yes Provider, Historical   edoxaban (SAVAYSA) 30 mg tablet Take 30 mg by mouth daily. Yes Provider, Historical   aspirin delayed-release 81 mg tablet Take 81 mg by mouth daily. Yes Other, MD Francoise   atorvastatin (LIPITOR) 40 mg tablet Take 40 mg by mouth daily. Yes Other, MD Francoise   cholecalciferol, vitamin D3, 2,000 unit tab Take 2,000 Units by mouth daily. Yes Other, MD Francoise   ferrous sulfate 325 mg (65 mg iron) tablet Take 65 mg by mouth daily. Yes Other, MD Francoise   levothyroxine (SYNTHROID) 112 mcg tablet Take 112 mcg by mouth Daily (before breakfast). Yes Other, MD Francoise   Omeprazole delayed release (PRILOSEC D/R) 20 mg tablet Take 20 mg by mouth daily. Yes Other, MD Francoise   phenytoin ER (DILANTIN ER) 100 mg ER capsule Take 300 mg by mouth nightly.  Take 3 (100mg) tablets every day at bedtime   Yes Other, MD Francoise   amiodarone (CORDARONE) 200 mg tablet Take 200 mg by mouth. 3/15/21   Provider, Historical        Review of Systems:  Negative except what is listed in HPI       Patient Active Problem List   Diagnosis Code    Systolic heart failure (HCC) I50.20    A-fib (HCC) I48.91    Chronic obstructive pulmonary disease (HCC) J44.9    Seizures (HCC) R56.9    Anemia D64.9    CKD (chronic kidney disease) N18.9    Hypothyroid E03.9    History of heart valve repair Z98.890    Mixed hyperlipidemia E78.2    Long term current use of anticoagulant therapy Z79.01    Mitral valve disease I05.9    Vitamin D deficiency E55.9     Past Surgical History:   Procedure Laterality Date    [place] CARDIAC SURG PROCEDURE UNLIST       Family History   Problem Relation Age of Onset    Heart Disease Father     Heart Attack Father      Social History     Tobacco Use    Smoking status: Former Smoker     Packs/day: 1.00     Years: 60.00     Pack years: 60.00    Smokeless tobacco: Never Used   Substance Use Topics    Alcohol use: Not Currently          Objective:   No flowsheet data found. General: alert, cooperative, no distress   Mental  status: normal mood, behavior, speech, dress, motor activity, and thought processes, able to follow commands   HENT: NCAT   Neck: no visualized mass   Resp: no respiratory distress   Neuro: no gross deficits   Skin: no discoloration or lesions of concern on visible areas   Psychiatric: normal affect, consistent with stated mood, no evidence of hallucinations     Additional exam findings: We discussed the expected course, resolution and complications of the diagnosis(es) in detail. Medication risks, benefits, costs, interactions, and alternatives were discussed as indicated. I advised him to contact the office if his condition worsens, changes or fails to improve as anticipated. He expressed understanding with the diagnosis(es) and plan.        Veronica Ambrose Rommel Gann, who was evaluated through a synchronous (real-time) audio-video encounter and/or his healthcare decision maker, is aware that it is a billable service, with coverage as determined by his insurance carrier. He provided verbal consent to proceed: Yes, and patient identification was verified. It was conducted pursuant to the emergency declaration under the [facility], [facility] authority and the [employer] [facility] and [employer] Act. A caregiver was present when appropriate. Ability to conduct physical exam was limited. I was in the office. The patient was at home. Negro York NP       This is the Subsequent Medicare Annual Wellness Exam, performed 12 months or more after the Initial AWV or the last Subsequent AWV    I have reviewed the patient's medical history in detail and updated the computerized patient record. Depression Risk Factor Screening:     3 most recent PHQ Screens 3/17/2021   Little interest or pleasure in doing things Not at all   Feeling down, depressed, irritable, or hopeless Not at all   Total Score PHQ 2 0       Alcohol Risk Screen    Do you average more than 1 drink per night or more than 7 drinks a week: No    In the past three months have you have had more than 4 drinks containing alcohol on one occasion: No        Functional Ability and Level of Safety:    Hearing: states he feels like he needs his hearing checked, states he will get this done throught the [place]      Activities of Daily Living: The home contains: grab bars and shower seat  Patient does total self care      Ambulation: with no difficulty     Fall Risk:  Fall Risk Assessment, last 12 mths 3/17/2021   Able to walk? Yes   Fall in past 12 months? 0   Do you feel unsteady?  0   Are you worried about falling 0      Abuse Screen:  Patient is not abused       Cognitive Screening    Has your family/caregiver stated any concerns about your memory: no    Cognitive Screening: Normal - three word recall: 3/3    Assessment/Plan   Education and counseling provided:  Are appropriate based on today's review and evaluation  End-of-Life planning (with patient's consent)    Diagnoses and all orders for this visit:    1. Medicare annual wellness visit, subsequent    2. Advanced care planning/counseling discussion    3. Hospital discharge follow-up  -     [place] DISCHARGE MEDS RECONCILED W/ CURRENT OUTPATIENT MED LIST    4. Mixed hyperlipidemia  -     LIPID PANEL; Future    5. Stage 3b chronic kidney disease  -     METABOLIC PANEL, COMPREHENSIVE; Future    6. Acquired hypothyroidism  -     TSH 3RD GENERATION; Future  -     T4, FREE; Future    7. Vitamin D deficiency  -     VITAMIN D, 25 HYDROXY; Future    8. Iron deficiency anemia secondary to inadequate dietary iron intake  -     CBC W/O DIFF; Future    9. Longstanding persistent atrial fibrillation ([address]    10. Chronic systolic heart failure ([address]    11. Seizures ([address]  Assessment & Plan: This condition is managed by Specialist.      Other orders  -     allopurinoL (ZYLOPRIM) 300 mg tablet; Take 1 Tab by mouth daily.       Health Maintenance Due     Health Maintenance Due   Topic Date Due    Hepatitis C Screening  Never done    Shingrix Vaccine Age 49> (1 of 2) Never done    AAA Screening 73-67 YO Male Smoking Patients  Never done    Lipid Screen  01/27/2021       Patient Care Team   Patient Care Team:  Ra Ojeda NP as PCP - General (Nurse Practitioner)  Ra Ojeda NP as PCP - [facility] Provider    History     Patient Active Problem List   Diagnosis Code    Systolic heart failure (HCC) I50.20    A-fib ([address] I48.91    Chronic obstructive pulmonary disease ([address] J44.9    Seizures ([address] R56.9    Anemia D64.9    CKD (chronic kidney disease) N18.9    Hypothyroid E03.9    History of heart valve repair Z98.890    Mixed hyperlipidemia E78.2    Long term current use of anticoagulant therapy Z79.01    Mitral valve disease I05.9    Vitamin D deficiency E55.9     Past Medical History:   Diagnosis Date    A-fib [facility])     [facility] pulmonary disease [facility]     Hypertension     MI (myocardial infarction) ([facility]     Seizures ([facility]     2002    Stroke ([facility]     2002      Past Surgical History:   Procedure Laterality Date    [place] CARDIAC SURG PROCEDURE UNLIST       Current Outpatient Medications   Medication Sig Dispense Refill    tamsulosin (FLOMAX) 0.4 mg capsule Take 0.4 mg by mouth.  allopurinoL (ZYLOPRIM) 300 mg tablet Take 1 Tab by mouth daily. 30 Tab 0    ascorbic acid, vitamin C, (Vitamin C) 250 mg tablet Take  by mouth.  zinc sulfate (ZINC-15 PO) Take  by mouth.  furosemide (LASIX) 40 mg tablet Take 40 mg by mouth.  terazosin (HYTRIN) 2 mg capsule Take 2 mg by mouth.  potassium chloride SA (MICRO-K) 10 mEq capsule Take 10 mEq by mouth two (2) times a day.  metoprolol tartrate (LOPRESSOR) 50 mg tablet Take 75 mg by mouth two (2) times a day.  edoxaban (SAVAYSA) 30 mg tablet Take 30 mg by mouth daily.  aspirin delayed-release 81 mg tablet Take 81 mg by mouth daily.  atorvastatin (LIPITOR) 40 mg tablet Take 40 mg by mouth daily.  cholecalciferol, vitamin D3, 2,000 unit tab Take 2,000 Units by mouth daily.  ferrous sulfate 325 mg (65 mg iron) tablet Take 65 mg by mouth daily.  levothyroxine (SYNTHROID) 112 mcg tablet Take 112 mcg by mouth Daily (before breakfast).  Omeprazole delayed release (PRILOSEC D/R) 20 mg tablet Take 20 mg by mouth daily.  phenytoin ER (DILANTIN ER) 100 mg ER capsule Take 300 mg by mouth nightly. Take 3 (100mg) tablets every day at bedtime      amiodarone (CORDARONE) 200 mg tablet Take 200 mg by mouth.        Allergies   Allergen Reactions    Ace Inhibitors Angioedema    Lisinopril Angioedema       Family History   Problem Relation Age of Onset    Heart Disease Father     Heart Attack Father Social History     Tobacco Use    Smoking status: Former Smoker     Packs/day: 1.00     Years: 60.00     Pack years: 60.00    Smokeless tobacco: Never Used   Substance Use Topics    Alcohol use: Not Currently       Avtar Johnson, who was evaluated through a synchronous (real-time) audio-video encounter, and/or his healthcare decision maker, is aware that it is a billable service, with coverage as determined by his insurance carrier. He provided verbal consent to proceed: Yes, and patient identification was verified. It was conducted pursuant to the emergency declaration under the [facility], [address] [place] [address] authority and the [employer] Act. A caregiver was present when appropriate. Ability to conduct physical exam was limited. I was in the office. The patient was at home.     Tenzin Feliz NP

## 2021-04-07 ENCOUNTER — VIRTUAL VISIT (OUTPATIENT)
Dept: FAMILY MEDICINE CLINIC | Age: 76
End: 2021-04-07
Payer: MEDICARE

## 2021-04-07 DIAGNOSIS — I48.11 LONGSTANDING PERSISTENT ATRIAL FIBRILLATION (HCC): ICD-10-CM

## 2021-04-07 DIAGNOSIS — J96.01 ACUTE RESPIRATORY FAILURE WITH HYPOXIA (HCC): ICD-10-CM

## 2021-04-07 DIAGNOSIS — J41.0 SIMPLE CHRONIC BRONCHITIS (HCC): Primary | ICD-10-CM

## 2021-04-07 DIAGNOSIS — I50.22 CHRONIC SYSTOLIC HEART FAILURE (HCC): ICD-10-CM

## 2021-04-07 PROBLEM — N40.0 BENIGN PROSTATIC HYPERPLASIA: Status: ACTIVE | Noted: 2021-03-17

## 2021-04-07 PROCEDURE — 99214 OFFICE O/P EST MOD 30 MIN: CPT | Performed by: NURSE PRACTITIONER

## 2021-04-07 PROCEDURE — 3017F COLORECTAL CA SCREEN DOC REV: CPT | Performed by: NURSE PRACTITIONER

## 2021-04-07 PROCEDURE — 1101F PT FALLS ASSESS-DOCD LE1/YR: CPT | Performed by: NURSE PRACTITIONER

## 2021-04-07 PROCEDURE — G8427 DOCREV CUR MEDS BY ELIG CLIN: HCPCS | Performed by: NURSE PRACTITIONER

## 2021-04-07 PROCEDURE — G8432 DEP SCR NOT DOC, RNG: HCPCS | Performed by: NURSE PRACTITIONER

## 2021-04-07 RX ORDER — CARVEDILOL 12.5 MG/1
TABLET ORAL
COMMUNITY
Start: 2022-01-28 | End: 2022-01-28

## 2021-04-07 RX ORDER — ALBUTEROL SULFATE 90 UG/1
AEROSOL, METERED RESPIRATORY (INHALATION)
COMMUNITY
Start: 2021-03-22

## 2021-04-07 RX ORDER — LEVETIRACETAM 250 MG/1
250 TABLET ORAL 2 TIMES DAILY
COMMUNITY
End: 2021-05-05 | Stop reason: DRUGHIGH

## 2021-04-07 RX ORDER — HYDRALAZINE HYDROCHLORIDE 25 MG/1
TABLET, FILM COATED ORAL
COMMUNITY
Start: 2022-01-28 | End: 2022-01-28

## 2021-04-07 NOTE — PATIENT INSTRUCTIONS
DASH Diet: Care Instructions Your Care Instructions The DASH diet is an eating plan that can help lower your blood pressure. DASH stands for Dietary Approaches to Stop Hypertension. Hypertension is high blood pressure. The DASH diet focuses on eating foods that are high in calcium, potassium, and magnesium. These nutrients can lower blood pressure. The foods that are highest in these nutrients are fruits, vegetables, low-fat dairy products, nuts, seeds, and legumes. But taking calcium, potassium, and magnesium supplements instead of eating foods that are high in those nutrients does not have the same effect. The DASH diet also includes whole grains, fish, and poultry. The DASH diet is one of several lifestyle changes your doctor may recommend to lower your high blood pressure. Your doctor may also want you to decrease the amount of sodium in your diet. Lowering sodium while following the DASH diet can lower blood pressure even further than just the DASH diet alone. Follow-up care is a key part of your treatment and safety. Be sure to make and go to all appointments, and call your doctor if you are having problems. It's also a good idea to know your test results and keep a list of the medicines you take. How can you care for yourself at home? Following the DASH diet · Eat 4 to 5 servings of fruit each day. A serving is 1 medium-sized piece of fruit, ½ cup chopped or canned fruit, 1/4 cup dried fruit, or 4 ounces (½ cup) of fruit juice. Choose fruit more often than fruit juice. · Eat 4 to 5 servings of vegetables each day. A serving is 1 cup of lettuce or raw leafy vegetables, ½ cup of chopped or cooked vegetables, or 4 ounces (½ cup) of vegetable juice. Choose vegetables more often than vegetable juice. · Get 2 to 3 servings of low-fat and fat-free dairy each day. A serving is 8 ounces of milk, 1 cup of yogurt, or 1 ½ ounces of cheese. · Eat 6 to 8 servings of grains each day.  A serving is 1 slice of bread, 1 ounce of dry cereal, or ½ cup of cooked rice, pasta, or cooked cereal. Try to choose whole-grain products as much as possible. · Limit lean meat, poultry, and fish to 2 servings each day. A serving is 3 ounces, about the size of a deck of cards. · Eat 4 to 5 servings of nuts, seeds, and legumes (cooked dried beans, lentils, and split peas) each week. A serving is 1/3 cup of nuts, 2 tablespoons of seeds, or ½ cup of cooked beans or peas. · Limit fats and oils to 2 to 3 servings each day. A serving is 1 teaspoon of vegetable oil or 2 tablespoons of salad dressing. · Limit sweets and added sugars to 5 servings or less a week. A serving is 1 tablespoon jelly or jam, ½ cup sorbet, or 1 cup of lemonade. · Eat less than 2,300 milligrams (mg) of sodium a day. If you limit your sodium to 1,500 mg a day, you can lower your blood pressure even more. Tips for success · Start small. Do not try to make dramatic changes to your diet all at once. You might feel that you are missing out on your favorite foods and then be more likely to not follow the plan. Make small changes, and stick with them. Once those changes become habit, add a few more changes. · Try some of the following: ? Make it a goal to eat a fruit or vegetable at every meal and at snacks. This will make it easy to get the recommended amount of fruits and vegetables each day. ? Try yogurt topped with fruit and nuts for a snack or healthy dessert. ? Add lettuce, tomato, cucumber, and onion to sandwiches. ? Combine a ready-made pizza crust with low-fat mozzarella cheese and lots of vegetable toppings. Try using tomatoes, squash, spinach, broccoli, carrots, cauliflower, and onions. ? Have a variety of cut-up vegetables with a low-fat dip as an appetizer instead of chips and dip. ? Sprinkle sunflower seeds or chopped almonds over salads. Or try adding chopped walnuts or almonds to cooked vegetables.  
? Try some vegetarian meals using beans and peas. Add garbanzo or kidney beans to salads. Make burritos and tacos with mashed jiang beans or black beans. Where can you learn more? Go to http://www.gray.com/ Enter Q914 in the search box to learn more about \"DASH Diet: Care Instructions. \" Current as of: December 16, 2019               Content Version: 12.6 © 9671-6267 [employer]. Care instructions adapted under license by "[employer] (which disclaims liability or warranty for this information). If you have questions about a medical condition or this instruction, always ask your healthcare professional. [address] any warranty or liability for your use of this information.

## 2021-04-07 NOTE — PROGRESS NOTES
[address] [place] [address]               [phone]      Trinity Marin is a 78 y.o. male who was seen by synchronous (real-time) audio-video technology on 4/7/2021. Consent: Trinity Marin, who was seen by synchronous (real-time) audio-video technology, and/or his healthcare decision maker, is aware that this patient-initiated, Telehealth encounter on 4/7/2021 is a billable service, with coverage as determined by his insurance carrier. He is aware that he may receive a bill and has provided verbal consent to proceed: Yes. Assessment & Plan:     Diagnoses and all orders for this visit:    1. Simple chronic bronchitis (HCC)  -     REFERRAL TO PULMONARY DISEASE    2. Chronic systolic heart failure (HCC)  -     REFERRAL TO CARDIOLOGY    3. Acute respiratory failure with hypoxia (HCC)  -     REFERRAL TO PULMONARY DISEASE    4. Longstanding persistent atrial fibrillation ([facility] [address]  -     REFERRAL TO CARDIOLOGY    Reviewed with Mr. Daphne Wagner and his daughter today his past 2 hospitalizations over the past 30 days, his first hospitalization was for COPD exacerbation and acute on chronic heart failure, he currently does not have a pulmonary physician, referral placed, he is in agreement to see one of the pulmonary doctors in the [facility] group, I advised him I will call and try to get him in as soon as possible to begin management of his ongoing lung disease and prevent another hospitalization. The patient and daughter are in agreement with this plan of care    Currently Mr. Daphne Wagner is managed by cardiologist in the [place] area through the [facility] administration, he would like to change to a cardiologist closer to home, he is in agreement to see one of the cardiologist in the [facility] group. Referral has been placed.   All of their questions have been answered and they were educated on the reasoning for the hospitalizations, medication reconciliation was completed    Follow-up and Dispositions    · Return in about 4 weeks (around 5/5/2021) for chf, htn, hld, copd, 30 min, office only. 712  Subjective:     Health Maintenance Due   Topic Date Due    Hepatitis C Screening  Never done    Shingrix Vaccine Age 50> (1 of 2) Never done    AAA Screening 73-69 YO Male Smoking Patients  Never done    COVID-19 Vaccine (2 - Rico Morelle 2-dose series) 04/13/2021             Shea Cruz is a 78 y.o. male who was seen for   Hospital Follow Up [facility] 3-24-21 to 3-31-21 for Acute Respiratory distress with [address] 4-2-21 to 4-3-21 for facial droop)    Present on this visit is his daughter Maulik Philip, he lives with her. Hospitalization summary; Hospitalized X2  1st: 3/24/21-3/31/21  PTA: he was walking and called out to his daughter due to severe SOB, EMS was called and determined he was in resp distress, he was subsequently admitted for COPD exacerbation, chronic systolic chf and Afib  Hospital summary  KATHE: consult with nephrology, as outpt CKD managed by [facility] neprology, had lasix decreased from 40bid to daily PTA. Cardiac consult: sinus tonio with prolonged QTc. Admited 3/7 for afib with cardoversion and amio  Discharged to home with continued home O2 and home health care    1 day after discharge: daughter noticed right facial droop, took back to [place], admitted: from 4/2-3/21  MRI: negative for acute CVA  told he did not have stroke, medications adjusted  Discharged home on endoxaban for antcoagulation: has been on this for about a year.  Was on warfarin prior and was having problems with this, difficulty with levels    Test results:  MRI head, MRA neck/head, Echo w/ stroke protocol  No significant abnormalities    Date of face to face: 4/7/21 via video    Medication reconciliation performed: Yes    Medications added/changed/discontinued: no medication changes    Review discharge instructions: Yes    Need for follow up on in hospital testing: No    Need for follow up with specialist: Yes  Name of specialist: pulmonary, cardiology    Does patient have help at home: Yes  Name: daughter    Barriers to obtaining medications: No    Patient/family educated on cause of hospitalization: Yes  Told to stay with 2000mg NA diet and 50ml fluid  Mr yoder: saw cardio in hosp the last time he was in, states he was told the heart muscle has gotten better    Patient/family able to repeat back cause of hospitalization, disease process, medication changes, and when to seek help: Yes    Patient Past Records were reviewed:  yes  Prior to Admission medications    Medication Sig Start Date End Date Taking? Authorizing Provider   levETIRAcetam (KEPPRA) 250 mg tablet Take 250 mg by mouth two (2) times a day. Yes Provider, Historical   tiotropium (Spiriva with HandiHaler) 18 mcg inhalation capsule Take 1 Cap by inhalation daily. Yes Provider, Historical   albuterol (PROVENTIL HFA, VENTOLIN HFA, PROAIR HFA) 90 mcg/actuation inhaler INHALE 2 PUFFS BY MOUTH EVERY 4 HOURS AS NEEDED FOR SHORTNESS OF BREATH. SHAKE WELL PRIOR TO USE. RINSE INHALER AND LET DRY AFTER EACH USE  AS NEEDED FOR WHEEZING FOR SHORTNESS OF BREATH. SHAKE WELL PRIOR TO USE. RINSE INHALER AND LET DRY AFTER EACH USE  AS NEEDED FOR WHEEZING 3/22/21  Yes Provider, Historical   amiodarone (CORDARONE) 200 mg tablet Take 200 mg by mouth. 3/15/21  Yes Provider, Historical   ascorbic acid, vitamin C, (Vitamin C) 250 mg tablet Take  by mouth. Yes Provider, Historical   zinc sulfate (ZINC-15 PO) Take  by mouth. Yes Provider, Historical   furosemide (LASIX) 40 mg tablet Take 40 mg by mouth. Yes Provider, Historical   terazosin (HYTRIN) 2 mg capsule Take 2 mg by mouth. 5/9/19  Yes Provider, Historical   edoxaban (SAVAYSA) 30 mg tablet Take 30 mg by mouth daily. Yes Provider, Historical   atorvastatin (LIPITOR) 40 mg tablet Take 40 mg by mouth daily.    Yes Other, MD Francoise cholecalciferol, vitamin D3, 2,000 unit tab Take 2,000 Units by mouth daily. Yes Other, MD Francoise   ferrous sulfate 325 mg (65 mg iron) tablet Take 65 mg by mouth daily. Yes Other, MD Francoise   levothyroxine (SYNTHROID) 112 mcg tablet Take 112 mcg by mouth Daily (before breakfast). Yes Other, MD Francoise   Omeprazole delayed release (PRILOSEC D/R) 20 mg tablet Take 20 mg by mouth daily. Yes Other, MD Francoise   carvediloL (COREG) 12.5 mg tablet carvedilol 12.5 mg tablet   TAKE 1 TABLET BY MOUTH TWICE DAILY WITH MEALS    Provider, Historical   hydrALAZINE (APRESOLINE) 25 mg tablet hydralazine 25 mg tablet   TAKE 1 TABLET BY MOUTH THREE TIMES DAILY    Provider, Historical   tamsulosin (FLOMAX) 0.4 mg capsule Take 0.4 mg by mouth. 3/15/21   Provider, Historical   allopurinoL (ZYLOPRIM) 300 mg tablet Take 1 Tab by mouth daily. 3/17/21   Radha Parrish NP   potassium chloride SA (MICRO-K) 10 mEq capsule Take 10 mEq by mouth two (2) times a day. Provider, Historical   metoprolol tartrate (LOPRESSOR) 50 mg tablet Take 75 mg by mouth two (2) times a day. Provider, Historical   aspirin delayed-release 81 mg tablet Take 81 mg by mouth daily. Other, MD Francoise   phenytoin ER (DILANTIN ER) 100 mg ER capsule Take 300 mg by mouth nightly.  Take 3 (100mg) tablets every day at bedtime    Other, MD Francoise     Allergies   Allergen Reactions    Ace Inhibitors Angioedema    Lisinopril Angioedema       Patient Active Problem List   Diagnosis Code    Systolic heart failure (HCC) I50.20    A-fib (HCC) I48.91    Chronic obstructive pulmonary disease (HCC) J44.9    Seizures (HCC) R56.9    Anemia D64.9    CKD (chronic kidney disease) N18.9    Hypothyroid E03.9    History of heart valve repair Z98.890    Mixed hyperlipidemia E78.2    Long term current use of anticoagulant therapy Z79.01    Mitral valve disease I05.9    Vitamin D deficiency E55.9    Benign prostatic hyperplasia N40.0     Past Surgical History: Procedure Laterality Date    DC CARDIAC SURG PROCEDURE UNLIST       Family History   Problem Relation Age of Onset    Heart Disease Father     Heart Attack Father      Social History     Tobacco Use    Smoking status: Former Smoker     Packs/day: 1.00     Years: 60.00     Pack years: 60.00    Smokeless tobacco: Never Used   Substance Use Topics    Alcohol use: Not Currently       ROS  As stated in HPI, otherwise all others negative. States his breathing is ok as long as he does not do a whole lot of exertion, is uins inhalers if he gets winded      Objective: There were no vitals taken for this visit. General: alert, cooperative, no distress   Mental  status: normal mood, behavior, speech, dress, motor activity, and thought processes, able to follow commands   HENT: NCAT   Neck: no visualized mass   Resp: no respiratory distress   Neuro: no gross deficits   Skin: no discoloration or lesions of concern on visible areas   Psychiatric: normal affect, consistent with stated mood, no evidence of hallucinations     Additional exam findings: We discussed the expected course, resolution and complications of the diagnosis(es) in detail. Medication risks, benefits, costs, interactions, and alternatives were discussed as indicated. I advised him to contact the office if his condition worsens, changes or fails to improve as anticipated. He expressed understanding with the diagnosis(es) and plan. Leigha Moreau is a 78 y.o. male who was evaluated by a video visit encounter for concerns as above. Patient identification was verified prior to start of the visit. A caregiver was present when appropriate. Due to this being a TeleHealth encounter (During [facility]- public health emergency), evaluation of the following organ systems was limited: Vitals/Constitutional/EENT/Resp/CV/GI//MS/Neuro/Skin/Heme-Lymph-Imm.   Pursuant to the emergency declaration under the [facility], [facility] authority and the [employer] Act, this Virtual  Visit was conducted, with patient's (and/or legal guardian's) consent, to reduce the patient's risk of exposure to COVID-19 and provide necessary medical care. Services were provided through a video synchronous discussion virtually to substitute for in-person clinic visit. Patient and provider were located at their individual homes. An After Visit Summary was printed and given to the patient. All diagnosis have been discussed with the patient and all of the patient's questions have been answered. Follow-up and Dispositions    · Return in about 4 weeks (around 5/5/2021) for chf, htn, hld, copd, 30 min, office only. Jazmyn Vasquez [place] [id number] [address]   Kassidy Ramirez

## 2021-04-07 NOTE — PROGRESS NOTES
1. Have you been to the ER, urgent care clinic since your last visit? Hospitalized since your last visit? Yes [facility] 3-24-21 to 3-31-21 [facility] facial droop    2. Have you seen or consulted any other health care providers outside of the [address] [place] [address] since your last visit? Include any pap smears or colon screening.  No     Health Maintenance Due   Topic Date Due    Hepatitis C Screening  Never done    Shingrix Vaccine Age 49> (1 of 2) Never done    AAA Screening 73-69 YO Male Smoking Patients  Never done    Lipid Screen  01/27/2021

## 2021-04-09 ENCOUNTER — TELEPHONE (OUTPATIENT)
Dept: FAMILY MEDICINE CLINIC | Age: 76
End: 2021-04-09

## 2021-04-09 NOTE — TELEPHONE ENCOUNTER
Returned call to Fritz Augustin. The phone number is not taking calls. Reached out to main office. They will call me back.

## 2021-04-09 NOTE — TELEPHONE ENCOUNTER
Reji Adkins returned call. States he has PT/OT/ and speech ordered. Is requesting skilled nursing for med management and COPD and telehealth monitoring  Informed Reji Adkins this is OK and I am in agreement with this plan of care.

## 2021-05-05 ENCOUNTER — VIRTUAL VISIT (OUTPATIENT)
Dept: FAMILY MEDICINE CLINIC | Age: 76
End: 2021-05-05
Payer: MEDICARE

## 2021-05-05 DIAGNOSIS — R97.20 ELEVATED PSA: ICD-10-CM

## 2021-05-05 DIAGNOSIS — R56.9 SEIZURES (HCC): ICD-10-CM

## 2021-05-05 DIAGNOSIS — K92.2 GASTROINTESTINAL HEMORRHAGE, UNSPECIFIED GASTROINTESTINAL HEMORRHAGE TYPE: Primary | ICD-10-CM

## 2021-05-05 DIAGNOSIS — I50.22 CHRONIC SYSTOLIC HEART FAILURE (HCC): ICD-10-CM

## 2021-05-05 PROCEDURE — G8432 DEP SCR NOT DOC, RNG: HCPCS | Performed by: NURSE PRACTITIONER

## 2021-05-05 PROCEDURE — 3017F COLORECTAL CA SCREEN DOC REV: CPT | Performed by: NURSE PRACTITIONER

## 2021-05-05 PROCEDURE — 99214 OFFICE O/P EST MOD 30 MIN: CPT | Performed by: NURSE PRACTITIONER

## 2021-05-05 PROCEDURE — 1101F PT FALLS ASSESS-DOCD LE1/YR: CPT | Performed by: NURSE PRACTITIONER

## 2021-05-05 PROCEDURE — G8427 DOCREV CUR MEDS BY ELIG CLIN: HCPCS | Performed by: NURSE PRACTITIONER

## 2021-05-05 RX ORDER — METOPROLOL TARTRATE 25 MG/1
25 TABLET, FILM COATED ORAL 2 TIMES DAILY
COMMUNITY
Start: 2021-04-28 | End: 2022-02-15

## 2021-05-05 RX ORDER — OMEPRAZOLE 40 MG/1
40 CAPSULE, DELAYED RELEASE ORAL 2 TIMES DAILY
COMMUNITY
Start: 2021-04-28 | End: 2022-01-28

## 2021-05-05 RX ORDER — DILTIAZEM HYDROCHLORIDE 60 MG/1
CAPSULE, EXTENDED RELEASE ORAL
COMMUNITY
Start: 2021-05-01 | End: 2021-05-05 | Stop reason: SDUPTHER

## 2021-05-05 RX ORDER — MIDODRINE HYDROCHLORIDE 10 MG/1
10 TABLET ORAL
COMMUNITY
Start: 2021-04-28 | End: 2021-06-21 | Stop reason: SDUPTHER

## 2021-05-05 RX ORDER — DILTIAZEM HYDROCHLORIDE 60 MG/1
60 CAPSULE, EXTENDED RELEASE ORAL 2 TIMES DAILY
Qty: 60 CAP | Refills: 1 | Status: SHIPPED | OUTPATIENT
Start: 2021-05-05 | End: 2021-06-21 | Stop reason: SDUPTHER

## 2021-05-05 RX ORDER — SODIUM BICARBONATE 650 MG/1
650 TABLET ORAL 3 TIMES DAILY
COMMUNITY
Start: 2021-04-28 | End: 2021-10-07

## 2021-05-05 RX ORDER — LEVETIRACETAM 500 MG/1
250 TABLET ORAL EVERY 12 HOURS
COMMUNITY
Start: 2021-04-03 | End: 2021-05-05

## 2021-05-05 RX ORDER — LEVETIRACETAM 250 MG/1
250 TABLET ORAL 2 TIMES DAILY
Qty: 180 TAB | Refills: 1 | Status: SHIPPED | OUTPATIENT
Start: 2021-05-05

## 2021-05-05 NOTE — PROGRESS NOTES
Jimmy               Kassidy Ramirez [address]               [phone]      Elaina Lombard is a 78 y.o. male who was seen by synchronous (real-time) audio-video technology on 5/5/2021. Consent: Elaina Lombard, who was seen by synchronous (real-time) audio-video technology, and/or his healthcare decision maker, is aware that this patient-initiated, Telehealth encounter on 5/5/2021 is a billable service, with coverage as determined by his insurance carrier. He is aware that he may receive a bill and has provided verbal consent to proceed: Yes. Assessment & Plan:   Diagnoses and all orders for this visit:    1. Gastrointestinal hemorrhage, unspecified gastrointestinal hemorrhage type  On April 17 he went to the emergency room for low blood pressure, at that time his hemoglobin was found to be 6.3, he was subsequently admitted and had a work-up for GI bleed, he had an EGD done which revealed esophagitis with no bleeding, I a medium size hiatal hernia and a nonbleeding diverticulum was found in the second portion of the duodenum. He received 2 units of packed RBCs and was discharged on May 1 in stable condition. At today's visit, as seen on video, he appears as well and in no distress  His discharge instructions were reviewed with him and his daughter and his medications were updated and verified  2. Seizures (HCC)  -     levETIRAcetam (KEPPRA) 250 mg tablet; Take 1 Tab by mouth two (2) times a day. During this hospitalization he was changed from Dilantin to Black Drumm for seizure control, refill provided  3. Chronic systolic heart failure (HCC)  -     dilTIAZem ER (CARDIZEM SR) 60 mg capsule; Take 1 Cap by mouth two (2) times a day. Refill provided  4. Elevated PSA  -     PSA, DIAGNOSTIC (PROSTATE SPECIFIC AG);  Future  We will follow-up on his elevated PSA level    Follow-up and Dispositions    · Return for keep upcoming appt 5/21.             712  Subjective:     Health Maintenance Due Topic Date Due    Hepatitis C Screening  Never done    Shingrix Vaccine Age 50> (1 of 2) Never done    AAA Screening 73-69 YO Male Smoking Patients  Never done    COVID-19 Vaccine (2 - Moderna 2-dose series) 04/13/2021             Mouna Marcelino is a 78 y.o. male who was seen for   Hospital Follow Up [employer] 4/17/21-5/1/21)    Referral follow up  Pulmonary and cardiology follow up: was admitted before Cardiology visit on 4/22 and pulmonary visit is June 28. F/u nephrology: May 4th, daughter will check on appt, she thought it was next week  F/u with urology: last visit 2/2/21-ordered MRI due to increasing PSA, f/u requested in 3 months around 5/2 with PSA prior  MRI: 2/15/21: no highly suspicioius prostate nodules or masses, overall PI-RADS assessment category: 2-PI-RADS 2 - Low (clinically significant cancer is unlikely to be present    Pulse Ox at home today: 88-89% at rest while sleeping and while awake 96%  B/p today 145/70, pulse 91      Receiving home health through [place]  Has been seen by speech, waiting on starting PT/OT/skilled nursing and COPD telehealth    Admitted 4/17 GI Bleed  Discharged 5/1/21     Start med: diltiazem 60mg BID, metoprolol 25mg every day, midodrine 10mg TID, sodium bicarbonate 650mg TID  Continue: lasix 40mg every day and omeprazole 40mg BID  Stop: carvedilol  Told to stop Iron but it is on discarge list, told prilosec and iron don not mix well, informed the daughter and patient the prilosec may decrease the absorption of the iron so take them 2 hours apart, but it is very important for him to have both medicaitons    Copied from chart    Hospital Course:  Patient admitted with above, s/p prbc, egd, stable in house and tolerating ac now, eager to go home, denies sob. Overall prognosis is guarded. Patient is felt to have achieved maximum benefit from inpatient stay and is in stable health to be discharged.  Discharge instructions and outpatient f/u and workup was explained to the patient and patient voices understanding. Problems Managed During Hospitalization:  Acute hypoxic resp failure suspect related to CHF/volume overload, possible COPD component  -CXR and CT + edema  -diuresis with IV lasix - cont for today  -wean down solumedrol to 20 mg BID today  -now 92% on 2 LPM - wean as we can   H/o CHFPEF with exacerbation  -diuresis as above  -ECHO earlier this month noted  GIB, likely UGIB with melena; acute blood loss anemia on anemia of chronic kidney disease  -Status post 2 units of blood transfusion   -Status post EGD-report reviewed no active bleeding  -s/p protonix - now on po prilosec  -As per GI, ac resumed  -Hgb stable at 9   Hypotension secondary to blood loss- resolved  -Status post 2 units of blood transfusion  -now off midodrine  Paroxysmal a.fib on ac, with RVR In setting of GIB- improved  -HR better  -cont amio, metoprolol, cardizem  -on endoxaban pta resumed as per GI on 4/26/21   KATHE over CKD IV  -cr 2.5, near his baseline- watch with diuresis  -renal following  H/o CVA  -holding asa for now  -resume ac with PAF  -cont lipitor   DM 2  -corrective insulin   H/o seizure  -cont keppra  VTE ppx   -now back on edoxapan    Follow up: PCP 7 days, Nephrology    EGD:  Findings:  LA Grade B (one or more mucosal breaks greater than 5 mm, not extending between the tops of two mucosal folds) esophagitis with no bleeding was found 36 cm from the incisors. A medium-sized hiatal hernia was present. No gross lesions were noted in the entire examined stomach. A greater than 50 mm non-bleeding diverticulum was found in the second portion of the duodenum. Post-Operative Diagnosis:  - LA Grade B reflux esophagitis. - Medium-sized hiatal hernia.  - No gross lesions in the stomach. - Non-bleeding duodenal diverticulum.  - No specimens collected. Prior to Admission medications    Medication Sig Start Date End Date Taking?  Authorizing Provider   midodrine (PROAMATINE) 10 mg tablet Take 10 mg by mouth three (3) times daily as needed. 4/28/21  Yes Provider, Historical   sodium bicarbonate 650 mg tablet Take 650 mg by mouth three (3) times daily. 4/28/21  Yes Provider, Historical   metoprolol tartrate (LOPRESSOR) 25 mg tablet Take 25 mg by mouth two (2) times a day. 4/28/21  Yes Provider, Historical   omeprazole (PRILOSEC) 40 mg capsule Take 40 mg by mouth two (2) times a day. 4/28/21  Yes Provider, Historical   dilTIAZem ER (CARDIZEM SR) 60 mg capsule Take 1 Cap by mouth two (2) times a day. 5/5/21  Yes Merissa Jensen NP   levETIRAcetam (KEPPRA) 250 mg tablet Take 1 Tab by mouth two (2) times a day. 5/5/21  Yes Merissa Jensen NP   tiotropium (Spiriva with HandiHaler) 18 mcg inhalation capsule Take 1 Cap by inhalation daily. Yes Provider, Historical   albuterol (PROVENTIL HFA, VENTOLIN HFA, PROAIR HFA) 90 mcg/actuation inhaler INHALE 2 PUFFS BY MOUTH EVERY 4 HOURS AS NEEDED FOR SHORTNESS OF BREATH. SHAKE WELL PRIOR TO USE. RINSE INHALER AND LET DRY AFTER EACH USE  AS NEEDED FOR WHEEZING FOR SHORTNESS OF BREATH. SHAKE WELL PRIOR TO USE. RINSE INHALER AND LET DRY AFTER EACH USE  AS NEEDED FOR WHEEZING 3/22/21  Yes Provider, Historical   amiodarone (CORDARONE) 200 mg tablet Take 200 mg by mouth daily. 3/15/21  Yes Provider, Historical   edoxaban (SAVAYSA) 30 mg tablet Take 30 mg by mouth daily. Yes Provider, Historical   atorvastatin (LIPITOR) 40 mg tablet Take 40 mg by mouth daily. Yes Other, MD Francoise   cholecalciferol, vitamin D3, 2,000 unit tab Take 2,000 Units by mouth daily. Yes Other, MD Francoise   levothyroxine (SYNTHROID) 112 mcg tablet Take 112 mcg by mouth Daily (before breakfast).    Yes Other, MD Francoise   carvediloL (COREG) 12.5 mg tablet carvedilol 12.5 mg tablet   TAKE 1 TABLET BY MOUTH TWICE DAILY WITH MEALS    Provider, Historical   hydrALAZINE (APRESOLINE) 25 mg tablet hydralazine 25 mg tablet   TAKE 1 TABLET BY MOUTH THREE TIMES DAILY    Provider, Historical   tamsulosin (FLOMAX) 0.4 mg capsule Take 0.4 mg by mouth. 3/15/21   Provider, Historical   allopurinoL (ZYLOPRIM) 300 mg tablet Take 1 Tab by mouth daily. 3/17/21   Harpreet Bull NP   ascorbic acid, vitamin C, (Vitamin C) 250 mg tablet Take  by mouth. Provider, Historical   zinc sulfate (ZINC-15 PO) Take  by mouth. Provider, Historical   furosemide (LASIX) 40 mg tablet Take 40 mg by mouth. Provider, Historical   terazosin (HYTRIN) 2 mg capsule Take 2 mg by mouth nightly. 5/9/19   Provider, Historical   potassium chloride SA (MICRO-K) 10 mEq capsule Take 10 mEq by mouth two (2) times a day. Provider, Historical   aspirin delayed-release 81 mg tablet Take 81 mg by mouth daily. Other, MD Francoise   ferrous sulfate 325 mg (65 mg iron) tablet Take 65 mg by mouth daily.     Other, MD Francoise     Allergies   Allergen Reactions    Ace Inhibitors Angioedema    Lisinopril Angioedema       Patient Active Problem List   Diagnosis Code    Systolic heart failure (HCC) I50.20    A-fib (HCC) I48.91    Chronic obstructive pulmonary disease (HCC) J44.9    Seizures ([facility]) R56.9    Anemia D64.9    CKD (chronic kidney disease) N18.9    Hypothyroid E03.9    History of heart valve repair Z98.890    Mixed hyperlipidemia E78.2    Long term current use of anticoagulant therapy Z79.01    Mitral valve disease I05.9    Vitamin D deficiency E55.9    Benign prostatic hyperplasia N40.0    GIB (gastrointestinal bleeding) K92.2     Past Surgical History:   Procedure Laterality Date    [place] CARDIAC SURG PROCEDURE UNLIST       Family History   Problem Relation Age of Onset    Heart Disease Father     Heart Attack Father      Social History     Tobacco Use    Smoking status: Former Smoker     Packs/day: 1.00     Years: 60.00     Pack years: 60.00    Smokeless tobacco: Never Used   Substance Use Topics    Alcohol use: Not Currently       Review of Systems   Constitutional: Negative for diaphoresis and malaise/fatigue. Respiratory: Positive for shortness of breath. SOB with activity, he is walking to increase stamina  He can continut to talk in full sentences   Cardiovascular: Positive for leg swelling. Negative for chest pain, palpitations and orthopnea. Eating a low sodium diet and monitoring H2O intake   Gastrointestinal: Negative for abdominal pain, blood in stool, constipation, diarrhea, heartburn, melena, nausea and vomiting. Neurological: Negative for dizziness. Objective: There were no vitals taken for this visit. General: alert, cooperative, no distress   Mental  status: normal mood, behavior, speech, dress, motor activity, and thought processes, able to follow commands   HENT: NCAT   Neck: no visualized mass   Resp: no respiratory distress   Neuro: no gross deficits   Skin: no discoloration or lesions of concern on visible areas   Psychiatric: normal affect, consistent with stated mood, no evidence of hallucinations     Additional exam findings: about 2+ pitting edema bilat      We discussed the expected course, resolution and complications of the diagnosis(es) in detail. Medication risks, benefits, costs, interactions, and alternatives were discussed as indicated. I advised him to contact the office if his condition worsens, changes or fails to improve as anticipated. He expressed understanding with the diagnosis(es) and plan. Rubina Edwards is a 78 y.o. male who was evaluated by a video visit encounter for concerns as above. Patient identification was verified prior to start of the visit. A caregiver was present when appropriate. Due to this being a TeleHealth encounter (During ITXBO-07 public health emergency), evaluation of the following organ systems was limited: Vitals/Constitutional/EENT/Resp/CV/GI//MS/Neuro/Skin/Heme-Lymph-Imm.   Pursuant to the emergency declaration under the [facility], 1135 waiver authority and the Coronavirus Preparedness and Response Supplemental Appropriations Act, this Virtual  Visit was conducted, with patient's (and/or legal guardian's) consent, to reduce the patient's risk of exposure to COVID-19 and provide necessary medical care. Services were provided through a video synchronous discussion virtually to substitute for in-person clinic visit. Patient and provider were located at their individual homes. An After Visit Summary was printed and given to the patient. All diagnosis have been discussed with the patient and all of the patient's questions have been answered. Follow-up and Dispositions    · Return for keep upcoming appt 5/21. Charla Bansal [place] [id number] [address]   Kassidy Ramirez

## 2021-05-05 NOTE — PROGRESS NOTES
Called patient and left message for her to return call to review clinical information for VV appointment.

## 2021-05-05 NOTE — PROGRESS NOTES
For virtual visit patient would like to receive link via TEXT: [phone]    Art Black is a 78 y.o. male (: 1945) evaluated via telephone on 2021 to address:    Chief Complaint   Patient presents with   [facility] Follow Up     Annmarie Titus 21-21       Patient-Reported Vitals 2021   Patient-Reported Pulse 91   Patient-Reported SpO2 89   Patient-Reported Systolic  044   Patient-Reported Diastolic 70        Allergies Reviewed. Learning Assessment:     Learning Assessment 2020   PRIMARY LEARNER Patient   HIGHEST LEVEL OF EDUCATION - PRIMARY LEARNER  4 YEARS OF COLLEGE   BARRIERS PRIMARY LEARNER NONE   CO-LEARNER CAREGIVER No   PRIMARY LANGUAGE ENGLISH   LEARNER PREFERENCE PRIMARY DEMONSTRATION   ANSWERED BY Saundra Jimenez   RELATIONSHIP SELF     Depression Screening:     3 most recent PHQ Screens 2021   Little interest or pleasure in doing things Not at all   Feeling down, depressed, irritable, or hopeless Not at all   Total Score PHQ 2 0     Fall Risk Assessment:     Fall Risk Assessment, last 12 mths 2021   Able to walk? Yes   Fall in past 12 months? 0   Do you feel unsteady? 0   Are you worried about falling 0     Abuse Screening:     Abuse Screening Questionnaire 2021   Do you ever feel afraid of your partner? N   Are you in a relationship with someone who physically or mentally threatens you? N   Is it safe for you to go home? Y     ADL Assessment:   No flowsheet data found. Coordination of Care Questionaire:   1. Have you been to the ER, urgent care clinic since your last visit? Hospitalized since your last visit? YES Annmarie Titus 21-21    2. Have you seen or consulted any other health care providers outside of the [address] [place] [address] since your last visit? Include any pap smears or colon screening. NO    Advanced Directive:   1. Do you have an Advanced Directive? NO    2. Would you like information on Advanced Directives?  NO

## 2021-05-10 PROBLEM — K92.2 GIB (GASTROINTESTINAL BLEEDING): Status: ACTIVE | Noted: 2021-04-17

## 2021-05-13 ENCOUNTER — TELEPHONE (OUTPATIENT)
Dept: FAMILY MEDICINE CLINIC | Age: 76
End: 2021-05-13

## 2021-05-13 DIAGNOSIS — R56.9 SEIZURES (HCC): Primary | ICD-10-CM

## 2021-05-13 NOTE — TELEPHONE ENCOUNTER
Returned call. Had virtual visit with [place] PCP 5/10/21. Dr. Mica Elias advised patient to find a local neurologist to manage his seizures, especially since there has been a change in his seizure medications from dilantin to keppra. Referral placed. All diagnosis have been discussed with the patient and all of the patient's questions have been answered.

## 2021-05-13 NOTE — TELEPHONE ENCOUNTER
Received a call from [facility] care taker requesting a referral for the neurologist. Patient caretaker is requesting a follow of call at [phone]

## 2021-05-15 ENCOUNTER — TELEPHONE (OUTPATIENT)
Dept: FAMILY MEDICINE CLINIC | Age: 76
End: 2021-05-15

## 2021-05-15 NOTE — TELEPHONE ENCOUNTER
Called and spoke with Mr. Connie Hurd to inform him his hemoglobin and hematocrit had dropped significantly and I felt like he could either still be bleeding or could have a new bleed therefore I felt he should go to the emergency department. He verbalized understanding and asked me to call his daughter Sherry Alvarez and gave me her number. Called Manjula at [phone], I explained to her the labs drawn yesterday showed a significant drop in his hemoglobin and hematocrit and I felt he should go back to the emergency department. She informed me that she has been checking his blood pressure regularly and noticed that it had been lower than usual and she did give him a dose of midodrine. She stated she will be taking him to [facility] emergency department and asked if I could call ahead. Informed her I would try to call and let them know that she would be arriving with him in the situation that is going on. Informed her either she or anyone at the hospital can call me back at  my personal number if need be.     [facility] emergency department gave a brief report on the patient and informed him that he would be arriving in about an hour

## 2021-06-21 ENCOUNTER — TELEPHONE (OUTPATIENT)
Dept: FAMILY MEDICINE CLINIC | Age: 76
End: 2021-06-21

## 2021-06-21 ENCOUNTER — OFFICE VISIT (OUTPATIENT)
Dept: FAMILY MEDICINE CLINIC | Age: 76
End: 2021-06-21
Payer: MEDICARE

## 2021-06-21 VITALS
HEART RATE: 75 BPM | BODY MASS INDEX: 27.94 KG/M2 | TEMPERATURE: 98.5 F | RESPIRATION RATE: 18 BRPM | OXYGEN SATURATION: 98 % | DIASTOLIC BLOOD PRESSURE: 67 MMHG | SYSTOLIC BLOOD PRESSURE: 124 MMHG | WEIGHT: 178 LBS | HEIGHT: 67 IN

## 2021-06-21 DIAGNOSIS — I50.22 CHRONIC SYSTOLIC HEART FAILURE (HCC): ICD-10-CM

## 2021-06-21 DIAGNOSIS — K92.2 GASTROINTESTINAL HEMORRHAGE, UNSPECIFIED GASTROINTESTINAL HEMORRHAGE TYPE: Primary | ICD-10-CM

## 2021-06-21 DIAGNOSIS — H91.91 DECREASED HEARING OF RIGHT EAR: ICD-10-CM

## 2021-06-21 DIAGNOSIS — G89.29 CHRONIC PAIN OF LEFT KNEE: ICD-10-CM

## 2021-06-21 DIAGNOSIS — E55.9 VITAMIN D DEFICIENCY: ICD-10-CM

## 2021-06-21 DIAGNOSIS — D50.8 IRON DEFICIENCY ANEMIA SECONDARY TO INADEQUATE DIETARY IRON INTAKE: ICD-10-CM

## 2021-06-21 DIAGNOSIS — E78.2 MIXED HYPERLIPIDEMIA: ICD-10-CM

## 2021-06-21 DIAGNOSIS — R42 ORTHOSTATIC DIZZINESS: ICD-10-CM

## 2021-06-21 DIAGNOSIS — M25.562 CHRONIC PAIN OF LEFT KNEE: ICD-10-CM

## 2021-06-21 DIAGNOSIS — N18.4 CHRONIC KIDNEY DISEASE, STAGE IV (SEVERE) (HCC): ICD-10-CM

## 2021-06-21 DIAGNOSIS — E03.9 ACQUIRED HYPOTHYROIDISM: ICD-10-CM

## 2021-06-21 PROBLEM — M17.12 ARTHRITIS OF KNEE, LEFT: Status: ACTIVE | Noted: 2021-06-21

## 2021-06-21 PROCEDURE — 3017F COLORECTAL CA SCREEN DOC REV: CPT | Performed by: NURSE PRACTITIONER

## 2021-06-21 PROCEDURE — G8427 DOCREV CUR MEDS BY ELIG CLIN: HCPCS | Performed by: NURSE PRACTITIONER

## 2021-06-21 PROCEDURE — G8536 NO DOC ELDER MAL SCRN: HCPCS | Performed by: NURSE PRACTITIONER

## 2021-06-21 PROCEDURE — 1101F PT FALLS ASSESS-DOCD LE1/YR: CPT | Performed by: NURSE PRACTITIONER

## 2021-06-21 PROCEDURE — G8419 CALC BMI OUT NRM PARAM NOF/U: HCPCS | Performed by: NURSE PRACTITIONER

## 2021-06-21 PROCEDURE — 99214 OFFICE O/P EST MOD 30 MIN: CPT | Performed by: NURSE PRACTITIONER

## 2021-06-21 PROCEDURE — G8432 DEP SCR NOT DOC, RNG: HCPCS | Performed by: NURSE PRACTITIONER

## 2021-06-21 RX ORDER — MIDODRINE HYDROCHLORIDE 10 MG/1
10 TABLET ORAL
Qty: 270 TABLET | Refills: 0 | Status: SHIPPED | OUTPATIENT
Start: 2021-06-21

## 2021-06-21 RX ORDER — DILTIAZEM HYDROCHLORIDE 60 MG/1
60 CAPSULE, EXTENDED RELEASE ORAL 2 TIMES DAILY
Qty: 60 CAPSULE | Refills: 1 | Status: SHIPPED | OUTPATIENT
Start: 2021-06-21 | End: 2022-02-15

## 2021-06-21 NOTE — PROGRESS NOTES
[address] [place] [address]               [phone]      Mouna Marcelino is a 78 y.o. male and presents with No chief complaint on file. Assessment/Plan:    Diagnoses and all orders for this visit:    1. Gastrointestinal hemorrhage, unspecified gastrointestinal hemorrhage type  Follow-up for his hospitalization, this is his second hospitalization for a GI bleed the first was in April and this 1 was in May subsequently after the most recent hospitalization he went to rehab. Reviewed his discharge instructions from both the hospitalization and rehab, medication reconciliation's were performed, he has appointments with his specialist set with the exception of nephrologist.  I will reach out to him and his daughter to find out if he has a nephrologist either at [facility] or locally, no notes can be found in the available medical records  Currently he denies any signs and symptoms of a GI bleed, he does have an appointment scheduled with gastroenterology for further follow-up and work-up to try and find the source of his bleed  2. Chronic pain of left knee  -     REFERRAL TO ORTHOPEDICS  Endorses a chronic problem with pain in his left knee, he has been told in the past it is osteoarthritis, per his request a referral to orthopedics has been placed  3. Chronic kidney disease, stage IV (severe) ([facility] [address]  Assessment & Plan:   monitored by specialist. No acute findings meriting change in the plan    Orders:  -     METABOLIC PANEL, COMPREHENSIVE; Future  -     CBC W/O DIFF; Future  After this most recent hospitalization he has progressed from stage III to stage IV kidney disease, his most recent creatinine was 2.9 with a decrease in his GFR  We will follow-up to assure he has an appointment with the nephrologist and continue to monitor  4.  Decreased hearing of right ear  -     REFERRAL TO ENT-OTOLARYNGOLOGY  He has an ongoing problem with decreased hearing, he was referred to audiology in January 2020 however he was unable to go due to the pandemic, new referral placed  5. Chronic systolic heart failure (HCC)  -     dilTIAZem ER (CARDIZEM SR) 60 mg capsule; Take 1 Capsule by mouth two (2) times a day. -     METABOLIC PANEL, COMPREHENSIVE; Future  His heart failure is managed by cardiology at the [facility], he is currently working on finding a cardiologist locally  [address] [place] [address] provided, follow-up labs ordered  6. Orthostatic dizziness  -     midodrine (PROAMATINE) 10 mg tablet; Take 1 Tablet by mouth three (3) times daily (with meals). -     METABOLIC PANEL, COMPREHENSIVE; Future  Refill provided  Follow-up labs ordered  7. Iron deficiency anemia secondary to inadequate dietary iron intake  -     CBC W/O DIFF; Future  He endorses taking his iron and vitamin C daily, follow-up labs ordered  8. Acquired hypothyroidism  -     TSH 3RD GENERATION; Future  -     T4, FREE; Future  Follow-up labs ordered  9. Mixed hyperlipidemia  -     LIPID PANEL; Future  Follow-up labs ordered  10. Vitamin D deficiency  -     VITAMIN D, 25 HYDROXY; Future  Follow-up labs ordered    Follow-up and Dispositions    · Return in about 3 months (around 9/21/2021) for hypothyroid, anemia, hypertension, 30 min, office only, AND, labs prior. Subjective:    Decreased hearing  C/o decreased hearing to both ears    In hospital from 5/15-28/ 21 Upper GI bleed  Went to rehab at [facility] 5/28-6/12/21  Started home health  6/17/21  He is doing home exercises from [facility] course  (copied from chart)  Hospital Course:     Recurrent GI bleed on anticoagulation  Acute blood loss anemia   - Guiac positive stool on admission  - GI followed.  Repeat EGD 5/17 with small hiatal hernia, nonobstructing Schatzki ring, large second duodenal diverticulum.    -Colonoscopy on 5/18 with 1 3 mm polyp of mid ascending colon, 1 3 mm polyp at anus, diverticulosis.  GI recommends  outpatient capsule endoscopy.  Recommend holding anticoagulation but okay to start on  aspirin.  -Bleeding scan reviewed with no active bleeding  - Status post 3 units of PRBC.  Hemoglobin stable currently. Will keep hemoglobin more than 7  -PPI. -Tolerating diet  -Iron studies noted.  IV Venofer.     Acute hypoxic respiratory failure- new   Acute on chronic Diastolic congestive heart failure  Small right pleural effusion  -Status post IV fluid  - No oxygen use at baseline. -CXR noted    -Continue Lasix 40 mg bid per nephrology.  Appreciate assistance with volume status management. -Monitor I's and O's.  -Last echo from 4/2/2021 reviewed     Acute on chronic hypotension likely due to GI blood loss  -BP better now  -s/p IVF.  Monitor closely while on IV lasix  -Continue midodrine as needed     CKD stage IV  Hypernatremia-mild  -Baseline creatinine around 3  -Monitor BMP closely while on diuresis  - continue sodium bicarb  - f/up renal as outpt      Paroxysmal A. fib on anticoagulation  -Hold anticoagulation for now per GI recs, f/up GI to restart   -Continue amiodarone, metoprolol and Cardizem for heart rate control.        History of CVA  -ok to resume aspirin per GI .  Continue statin     Diabetes  -Glucose checks before meals and at bedtime.  SSI  -Last A1c 6     History of seizures  -Continue Keppra       Med rec  discarge from hospital only medication changes were to stop ASA and edoxaban, they were instructed to hold until seen by neurology  Admitted to [address] [place] [address] from 5/28-6/12/21  Med rec from [facility] completed, no changes to medication regimen    He is currently managed by the [employer] for most of his medfical conditions  Referrals to neuro, cardio and pulmonary have already been placed  He has not been able to make any of the visits due to being in the hospital  States he has an appointment with urology and nephrology, he does not know why he is going to nephrology    Left knee pain  Long standing- 5+ years  Has pain when the weather is humid, raining or cold  Does not take any medication to relieve the pain  Has seen ortho in the past  X-rays on 3/24/21    Nephrology  They have been told his kidneys are failing  This is from all the hospitalizations  [facility]    Going [facility] for follow up to find source of bleed      ROS:     ROS  As stated in HPI, otherwise all others negative. The problem list was updated as a part of today's visit. Patient Active Problem List   Diagnosis Code    Systolic heart failure (HCC) I50.20    A-fib (HCC) I48.91    Chronic obstructive pulmonary disease (HCC) J44.9    Seizures (HCC) R56.9    Anemia D64.9    Chronic kidney disease, stage IV (severe) (HCC) N18.4    Hypothyroid E03.9    History of heart valve repair Z98.890    Mixed hyperlipidemia E78.2    Long term current use of anticoagulant therapy Z79.01    Mitral valve disease I05.9    Vitamin D deficiency E55.9    Benign prostatic hyperplasia N40.0    GIB (gastrointestinal bleeding) K92.2    Arthritis of knee, left M17.12       The PSH, FH were reviewed. SH:  Social History     Tobacco Use    Smoking status: Former Smoker     Packs/day: 1.00     Years: 54.00     Pack years: 54.00     Quit date: 2019     Years since quittin.0    Smokeless tobacco: Never Used   Vaping Use    Vaping Use: Never used   Substance Use Topics    Alcohol use: Not Currently    Drug use: Not on file       Medications/Allergies:  Current Outpatient Medications on File Prior to Visit   Medication Sig Dispense Refill    metoprolol tartrate (LOPRESSOR) 25 mg tablet Take 25 mg by mouth two (2) times a day.  levETIRAcetam (KEPPRA) 250 mg tablet Take 1 Tab by mouth two (2) times a day. 180 Tab 1    tiotropium (Spiriva with HandiHaler) 18 mcg inhalation capsule Take 1 Cap by inhalation daily.  amiodarone (CORDARONE) 200 mg tablet Take 200 mg by mouth daily.  zinc sulfate (ZINC-15 PO) Take  by mouth.       furosemide (LASIX) 40 mg tablet Take 40 mg by mouth.  terazosin (HYTRIN) 2 mg capsule Take 2 mg by mouth nightly.  atorvastatin (LIPITOR) 40 mg tablet Take 40 mg by mouth daily.  cholecalciferol, vitamin D3, 2,000 unit tab Take 2,000 Units by mouth daily.  ferrous sulfate 325 mg (65 mg iron) tablet Take 65 mg by mouth daily.  levothyroxine (SYNTHROID) 112 mcg tablet Take 112 mcg by mouth Daily (before breakfast).  sodium bicarbonate 650 mg tablet Take 650 mg by mouth three (3) times daily.  omeprazole (PRILOSEC) 40 mg capsule Take 40 mg by mouth two (2) times a day.  carvediloL (COREG) 12.5 mg tablet carvedilol 12.5 mg tablet   TAKE 1 TABLET BY MOUTH TWICE DAILY WITH MEALS      hydrALAZINE (APRESOLINE) 25 mg tablet hydralazine 25 mg tablet   TAKE 1 TABLET BY MOUTH THREE TIMES DAILY      albuterol (PROVENTIL HFA, VENTOLIN HFA, PROAIR HFA) 90 mcg/actuation inhaler INHALE 2 PUFFS BY MOUTH EVERY 4 HOURS AS NEEDED FOR SHORTNESS OF BREATH. SHAKE WELL PRIOR TO USE. RINSE INHALER AND LET DRY AFTER EACH USE  AS NEEDED FOR WHEEZING FOR SHORTNESS OF BREATH. SHAKE WELL PRIOR TO USE. RINSE INHALER AND LET DRY AFTER EACH USE  AS NEEDED FOR WHEEZING      tamsulosin (FLOMAX) 0.4 mg capsule Take 0.4 mg by mouth. (Patient not taking: Reported on 6/28/2021)      allopurinoL (ZYLOPRIM) 300 mg tablet Take 1 Tab by mouth daily. 30 Tab 0    ascorbic acid, vitamin C, (Vitamin C) 250 mg tablet Take  by mouth.  potassium chloride SA (MICRO-K) 10 mEq capsule Take 10 mEq by mouth two (2) times a day. (Patient not taking: Reported on 6/28/2021)      aspirin delayed-release 81 mg tablet Take 81 mg by mouth daily. (Patient not taking: Reported on 6/28/2021)       No current facility-administered medications on file prior to visit.         Allergies   Allergen Reactions    Ace Inhibitors Angioedema    Lisinopril Angioedema       Objective:  Visit Vitals  /67   Pulse 75   Temp 98.5 °F (36.9 °C) (Temporal)   Resp 18 Ht 5' 7\" (1.702 m)   Wt 178 lb (80.7 kg)   SpO2 98%   BMI 27.88 kg/m²    Body mass index is 27.88 kg/m². Physical assessment  Physical Exam  Vitals and nursing note reviewed. Eyes:      Conjunctiva/sclera: Conjunctivae normal.      Pupils: Pupils are equal, round, and reactive to light. Neck:      Vascular: No JVD. Cardiovascular:      Rate and Rhythm: Normal rate and regular rhythm. Heart sounds: Normal heart sounds. No murmur heard. No friction rub. No gallop. Pulmonary:      Effort: Pulmonary effort is normal.      Breath sounds: Normal breath sounds. Musculoskeletal:         General: Normal range of motion. Cervical back: Normal range of motion. Right knee: Crepitus present. Left knee: Crepitus present. No swelling, deformity or effusion. Normal range of motion. Skin:     General: Skin is warm and dry. Neurological:      Mental Status: He is alert and oriented to person, place, and time.    Psychiatric:         Mood and Affect: Affect normal.         Cognition and Memory: Memory normal.         Judgment: Judgment normal.           Labwork and Ancillary Studies:    CBC w/Diff  Lab Results   Component Value Date/Time    WBC 7.1 05/14/2021 09:37 AM    HGB 6.5 (LL) 05/14/2021 09:37 AM    PLATELET 623 78/13/8955 09:37 AM         Basic Metabolic Profile  Lab Results   Component Value Date/Time    Sodium 146 (H) 05/14/2021 09:37 AM    Potassium 4.6 05/14/2021 09:37 AM    Chloride 107 (H) 05/14/2021 09:37 AM    CO2 26 05/14/2021 09:37 AM    Anion gap 8 04/23/2019 05:55 AM    Glucose 101 (H) 05/14/2021 09:37 AM    BUN 47 (H) 05/14/2021 09:37 AM    Creatinine 2.97 (H) 05/14/2021 09:37 AM    BUN/Creatinine ratio 16 05/14/2021 09:37 AM    GFR est AA 23 (L) 05/14/2021 09:37 AM    GFR est non-AA 20 (L) 05/14/2021 09:37 AM    Calcium 8.3 (L) 05/14/2021 09:37 AM        Cholesterol  Lab Results   Component Value Date/Time    Cholesterol, total 77 (L) 05/14/2021 09:37 AM    HDL Cholesterol 27 (L) 05/14/2021 09:37 AM    LDL, calculated 33 05/14/2021 09:37 AM    LDL, calculated 46 01/27/2020 02:26 AM    Triglyceride 79 05/14/2021 09:37 AM       Health Maintenance:   Health Maintenance   Topic Date Due    Hepatitis C Screening  Never done    Shingrix Vaccine Age 50> (1 of 2) Never done    AAA Screening 73-67 YO Male Smoking Patients  Never done    Medicare Yearly Exam  03/18/2022    Lipid Screen  05/14/2022    DTaP/Tdap/Td series (2 - Td or Tdap) 05/23/2023    Colorectal Cancer Screening Combo  05/18/2031    Flu Vaccine  Completed    COVID-19 Vaccine  Completed    Pneumococcal 65+ yrs at Risk Vaccine  Completed       I have discussed the diagnosis with the patient and the intended plan as seen in the above orders. The patient has received an After-Visit Summary and questions were answered concerning future plans. An After Visit Summary was printed and given to the patient. All diagnosis have been discussed with the patient and all of the patient's questions have been answered. Follow-up and Dispositions    · Return in about 3 months (around 9/21/2021) for hypothyroid, anemia, hypertension, 30 min, office only, AND, labs prior. Taye Marlow, [facility]  [address] [facility]   [address] [facility] [address]  [address]

## 2021-06-21 NOTE — PROGRESS NOTES
Patients State's \"10 day's ago I was in rehab and 2 weeks before that I was in the hospital\"    Patient state's \"when I walk my left knee hurts not all the time but sometimes\"    1. Have you been to the ER, urgent care clinic since your last visit? Hospitalized since your last visit? Yes [facility]     2. Have you seen or consulted any other health care providers outside of the [address] [place] [address] since your last visit? Include any pap smears or colon screening.  Yes Blood work for kidneys at [place] [facility]       Health Maintenance Due   Topic Date Due    Hepatitis C Screening  Never done    Shingrix Vaccine Age 50> (1 of 2) Never done    AAA Screening 73-69 YO Male Smoking Patients  Never done

## 2021-06-21 NOTE — PATIENT INSTRUCTIONS
Neurology referral     You have been referred to:  [facility] [id number]  Phone: [phone]  Fax: [phone]    Cardiology  You have been referred to:  [facility] [id number]  Phone: [phone]  Fax: [phone]    Pulmonary     You have been referred to:  Francy Bustos  [address] [place] [address]  Phone: [phone]  Fax: [phone]

## 2021-06-21 NOTE — TELEPHONE ENCOUNTER
The patient's daughter Meet Richter called to inform NANI Dominique with an updated medication list. She would like to know if she should go to the cardiovasular doctor that NANI Dominique referred her dad too or the one she has picked for her dad. Ms. Dalila ortiz's would like for her father to see an hearing doctor and orthopedic's for the arthrist in his leg's.

## 2021-06-28 ENCOUNTER — OFFICE VISIT (OUTPATIENT)
Dept: PULMONOLOGY | Age: 76
End: 2021-06-28
Payer: MEDICARE

## 2021-06-28 VITALS
HEART RATE: 58 BPM | RESPIRATION RATE: 16 BRPM | SYSTOLIC BLOOD PRESSURE: 128 MMHG | WEIGHT: 176.4 LBS | DIASTOLIC BLOOD PRESSURE: 66 MMHG | OXYGEN SATURATION: 97 % | TEMPERATURE: 98.6 F | HEIGHT: 67 IN | BODY MASS INDEX: 27.69 KG/M2

## 2021-06-28 DIAGNOSIS — N18.9 CHRONIC KIDNEY DISEASE, UNSPECIFIED CKD STAGE: ICD-10-CM

## 2021-06-28 DIAGNOSIS — I50.20 HFREF (HEART FAILURE WITH REDUCED EJECTION FRACTION) (HCC): ICD-10-CM

## 2021-06-28 DIAGNOSIS — R06.00 DYSPNEA, UNSPECIFIED TYPE: Primary | ICD-10-CM

## 2021-06-28 DIAGNOSIS — I27.20 PULMONARY HTN (HCC): ICD-10-CM

## 2021-06-28 PROCEDURE — 3017F COLORECTAL CA SCREEN DOC REV: CPT | Performed by: INTERNAL MEDICINE

## 2021-06-28 PROCEDURE — G8432 DEP SCR NOT DOC, RNG: HCPCS | Performed by: INTERNAL MEDICINE

## 2021-06-28 PROCEDURE — 1101F PT FALLS ASSESS-DOCD LE1/YR: CPT | Performed by: INTERNAL MEDICINE

## 2021-06-28 PROCEDURE — G8419 CALC BMI OUT NRM PARAM NOF/U: HCPCS | Performed by: INTERNAL MEDICINE

## 2021-06-28 PROCEDURE — 99204 OFFICE O/P NEW MOD 45 MIN: CPT | Performed by: INTERNAL MEDICINE

## 2021-06-28 PROCEDURE — G8536 NO DOC ELDER MAL SCRN: HCPCS | Performed by: INTERNAL MEDICINE

## 2021-06-28 PROCEDURE — G8427 DOCREV CUR MEDS BY ELIG CLIN: HCPCS | Performed by: INTERNAL MEDICINE

## 2021-06-28 NOTE — PROGRESS NOTES
HISTORY OF PRESENT ILLNESS  Jaycob Traore is a 78 y.o. male referred for shortness of breath. Pt's symptoms started in 2021 with note of shortness of breath with mild exertion resulting in multiple hospital and ED visits. [facility] pt was sent to the ED with severe symptomatic anemia. Extensive workup included upper and lower endoscopies which revealed reflux esophagitis, hiatal hernia and non bleeding duodenal diverticulum. Coloscopy showed polyps and diverticulosis, no active bleeding. Pt was also noted to be hypoxemic with a CT chest significant for pulmonary edema, small pleural effusions, passive atelectasis, new ascites. Stress echo done earlier in the year did not yield evidence of CAD. Pt was treated for possible heart failure and lost ~30 lbs on discharge, wome of which he has gained back. He denies chest pain, fever or hemoptysis. Pt himself denies wheezing although he was told that his family hears him wheeze. Pt currently denies dyspnea, and a record of SpO2 done 3 times per day are all in the high 90's. He has noted pedal edema and increased abdominal girth over the past few months. His daughter has brought up concerns of sleep apnea although pt denies loud snoring, daytime hypersomnolence, headache on awakening or non refreshing sleep. PMH includes CVA in 2002 and an MI in 2006. Pt had a MV repair in [facility]. Review of Systems   Constitutional: Positive for weight loss. Negative for chills, diaphoresis and fever. Malaise/fatigue: resolved. HENT: Negative for congestion, ear discharge, ear pain, hearing loss, nosebleeds, sinus pain, sore throat and tinnitus. Eyes: Negative for blurred vision, double vision, photophobia, pain, discharge and redness. Respiratory: Negative for cough, hemoptysis, sputum production, wheezing and stridor. Shortness of breath: resolved.     Cardiovascular: Negative for chest pain, palpitations, orthopnea, claudication and PND. Gastrointestinal: Negative for abdominal pain, blood in stool, constipation, diarrhea, heartburn, melena, nausea and vomiting. Genitourinary: Negative for dysuria, flank pain, frequency, hematuria and urgency. Nocturia 2-3 times per night   Musculoskeletal: Negative for back pain, falls, joint pain, myalgias and neck pain. Skin: Negative for itching and rash. Neurological: Negative for tingling, tremors, sensory change, focal weakness, seizures, loss of consciousness, weakness and headaches. Dizziness: resolved. Speech change: chronic post CVA. Endo/Heme/Allergies: Negative for environmental allergies and polydipsia. Does not bruise/bleed easily. Psychiatric/Behavioral: Negative for depression, hallucinations, memory loss, substance abuse and suicidal ideas. The patient is not nervous/anxious and does not have insomnia. Past Medical History:   Diagnosis Date    A-fib [facility])     Chronic obstructive pulmonary disease ([address]     Hypertension     MI (myocardial infarction) ([address]     Seizures ([address]     2002    Stroke ([address]     2002     Past Surgical History:   Procedure Laterality Date    [place] CARDIAC SURG PROCEDURE UNLIST       Current Outpatient Medications on File Prior to Visit   Medication Sig Dispense Refill    dilTIAZem ER (CARDIZEM SR) 60 mg capsule Take 1 Capsule by mouth two (2) times a day. 60 Capsule 1    midodrine (PROAMATINE) 10 mg tablet Take 1 Tablet by mouth three (3) times daily (with meals). 270 Tablet 0    sodium bicarbonate 650 mg tablet Take 650 mg by mouth three (3) times daily.  metoprolol tartrate (LOPRESSOR) 25 mg tablet Take 25 mg by mouth two (2) times a day.  omeprazole (PRILOSEC) 40 mg capsule Take 40 mg by mouth two (2) times a day.  levETIRAcetam (KEPPRA) 250 mg tablet Take 1 Tab by mouth two (2) times a day.  180 Tab 1    carvediloL (COREG) 12.5 mg tablet carvedilol 12.5 mg tablet   TAKE 1 TABLET BY MOUTH TWICE DAILY WITH MEALS      hydrALAZINE (APRESOLINE) 25 mg tablet hydralazine 25 mg tablet   TAKE 1 TABLET BY MOUTH THREE TIMES DAILY      tiotropium (Spiriva with HandiHaler) 18 mcg inhalation capsule Take 1 Cap by inhalation daily.  albuterol (PROVENTIL HFA, VENTOLIN HFA, PROAIR HFA) 90 mcg/actuation inhaler INHALE 2 PUFFS BY MOUTH EVERY 4 HOURS AS NEEDED FOR SHORTNESS OF BREATH. SHAKE WELL PRIOR TO USE. RINSE INHALER AND LET DRY AFTER EACH USE  AS NEEDED FOR WHEEZING FOR SHORTNESS OF BREATH. SHAKE WELL PRIOR TO USE. RINSE INHALER AND LET DRY AFTER EACH USE  AS NEEDED FOR WHEEZING      amiodarone (CORDARONE) 200 mg tablet Take 200 mg by mouth daily.  allopurinoL (ZYLOPRIM) 300 mg tablet Take 1 Tab by mouth daily. 30 Tab 0    ascorbic acid, vitamin C, (Vitamin C) 250 mg tablet Take  by mouth.  zinc sulfate (ZINC-15 PO) Take  by mouth.  furosemide (LASIX) 40 mg tablet Take 40 mg by mouth.  terazosin (HYTRIN) 2 mg capsule Take 2 mg by mouth nightly.  atorvastatin (LIPITOR) 40 mg tablet Take 40 mg by mouth daily.  cholecalciferol, vitamin D3, 2,000 unit tab Take 2,000 Units by mouth daily.  ferrous sulfate 325 mg (65 mg iron) tablet Take 65 mg by mouth daily.  levothyroxine (SYNTHROID) 112 mcg tablet Take 112 mcg by mouth Daily (before breakfast).  tamsulosin (FLOMAX) 0.4 mg capsule Take 0.4 mg by mouth. (Patient not taking: Reported on 6/28/2021)      potassium chloride SA (MICRO-K) 10 mEq capsule Take 10 mEq by mouth two (2) times a day. (Patient not taking: Reported on 6/28/2021)      aspirin delayed-release 81 mg tablet Take 81 mg by mouth daily. (Patient not taking: Reported on 6/28/2021)       No current facility-administered medications on file prior to visit.      Allergies   Allergen Reactions    Ace Inhibitors Angioedema    Lisinopril Angioedema     Family History   Problem Relation Age of Onset    Heart Disease Father     Heart Attack Father     Alcohol abuse Father    Rosalba Whitlock Diabetes Maternal Grandfather      Social History     Socioeconomic History    Marital status:      Spouse name: Not on file    Number of children: Not on file    Years of education: Not on file    Highest education level: Not on file   Occupational History    Not on file   Tobacco Use    Smoking status: Former Smoker     Packs/day: 1.00     Years: 54.00     Pack years: 54.00     Quit date: 2019     Years since quittin.0    Smokeless tobacco: Never Used   Vaping Use    Vaping Use: Never used   Substance and Sexual Activity    Alcohol use: Not Currently    Drug use: Not on file    Sexual activity: Yes   Other Topics Concern    Not on file   Social History Narrative    Not on file     Social Determinants of Health     Financial Resource Strain:     Difficulty of Paying Living Expenses:    Food Insecurity:     Worried About Running Out of Food in the Last Year:     [address] in the Last Year:    Transportation Needs:     Lack of Transportation (Medical):  Lack of Transportation (Non-Medical):    Physical Activity:     Days of Exercise per Week:     Minutes of Exercise per Session:    Stress:     Feeling of Stress :    Social Connections:     Frequency of Communication with Friends and Family:     Frequency of Social Gatherings with Friends and Family:     Attends Taoism Services:     Active Member of Clubs or Organizations:     Attends Club or Organization Meetings:     Marital Status:    Intimate Partner Violence:     Fear of Current or Ex-Partner:     Emotionally Abused:     Physically Abused:     Sexually Abused:      Blood pressure 128/66, pulse (!) 58, temperature 98.6 °F (37 °C), temperature source Oral, resp. rate 16, height 5' 7\" (1.702 m), weight 80 kg (176 lb 6.4 oz), SpO2 97 %. Physical Exam  Constitutional:       General: He is not in acute distress. Appearance: Normal appearance. He is not ill-appearing, toxic-appearing or diaphoretic. HENT:      Head: Normocephalic and atraumatic. Left Ear: External ear normal.      Nose:      Comments: Deferred      Mouth/Throat:      Comments: Deferred   Eyes:      General: No scleral icterus. Right eye: No discharge. Left eye: No discharge. Extraocular Movements: Extraocular movements intact. Conjunctiva/sclera: Conjunctivae normal.      Pupils: Pupils are equal, round, and reactive to light. Neck:      Vascular: No carotid bruit. Cardiovascular:      Rate and Rhythm: Normal rate. Rhythm irregular. Pulses: Normal pulses. Heart sounds: Normal heart sounds. Murmur: systolic 2/6. No gallop. Pulmonary:      Effort: Pulmonary effort is normal. No respiratory distress. Breath sounds: Normal breath sounds. No stridor. No wheezing, rhonchi or rales. Chest:      Chest wall: No tenderness. Musculoskeletal:      Cervical back: No rigidity or tenderness. Lymphadenopathy:      Cervical: No cervical adenopathy. Neurological:      Mental Status: He is alert. 04/18/19    ECHO ADULT COMPLETE 04/19/2019 4/19/2019    Interpretation Summary  · Left ventricular mildly decreased systolic function. Estimated left ventricular ejection fraction is 41 - 45%. Visually measured ejection fraction. Left ventricular mild concentric hypertrophy. Left ventricular global hypokinesis. · Left atrial cavity size is severely dilated. · Right ventricular cavity size is mildly dilated. Right ventricular global systolic function is reduced. · Right atrial cavity size is mildly dilated. · Mild aortic valve sclerosis with no evidence of reduced excursion. · Mitral valve thickening and repaired with annuloplasty ring. Mild mitral valve stenosis. Mild mitral valve regurgitation. Signed by: Lanette Collado MD on 4/19/2019 12:50 PM      ASSESSMENT and PLAN  Encounter Diagnoses   Name Primary?     Dyspnea, unspecified type Yes    Pulmonary HTN (HCC)     HFrEF (heart failure with reduced ejection fraction) (HCC)     Chronic kidney disease, unspecified CKD stage        Pt with recent hospital admission for SOB and hypoxemia, suspect due to heart failure decompensation, federico with note or edema, ascites, weight gain and loss with diuresis. Pulmonary Hypertension is likely Grp 2 rather than Grp 3 but will R/o with full PFT's. There is no indication for supplemental daytime O2 but will assess for nocturnal hypoxemia with overnight oxymetry. Should obstruction be demonstrated on PFT's would start KAMLA and maintenance inhalers as appropriate. Management of pulmonary edema per cardiology. Further intervention pending test results. Pt to return after tests.

## 2021-06-28 NOTE — Clinical Note
6/28/2021    Patient: Lydia Hager   YOB: 1945   Date of Visit: 6/28/2021     Jason Barker NP  [address] [id number]  [place], [place]  [place]     Dear LUCY Estevez NP,      Thank you for referring Mr. Lydia Hager to [address] [place] [address] for evaluation. My notes for this consultation are attached. If you have questions, please do not hesitate to call me. I look forward to following your patient along with you.       Sincerely,    Greg Purdy MD

## 2021-06-28 NOTE — PROGRESS NOTES
Jaycob Traore presents today for   Chief Complaint   Patient presents with   [facility] Other     Per NP Princess Yadav. Chronic bronchitis. Acute respiratory failure. Is someone accompanying this pt? No    Is the patient using any DME equipment during OV? No    -DME Company NA    Depression Screening:  3 most recent PHQ Screens 2021   Little interest or pleasure in doing things Not at all   Feeling down, depressed, irritable, or hopeless Not at all   Total Score PHQ 2 0       Learning Assessment:  Learning Assessment 2020   PRIMARY LEARNER Patient   HIGHEST LEVEL OF EDUCATION - PRIMARY LEARNER  4 YEARS OF COLLEGE   BARRIERS PRIMARY LEARNER NONE   CO-LEARNER CAREGIVER No   PRIMARY LANGUAGE ENGLISH   LEARNER PREFERENCE PRIMARY DEMONSTRATION   ANSWERED BY Rocklin Apgar   RELATIONSHIP SELF       Abuse Screening:  Abuse Screening Questionnaire 2021   Do you ever feel afraid of your partner? N   Are you in a relationship with someone who physically or mentally threatens you? N   Is it safe for you to go home? Y       Fall Risk  Fall Risk Assessment, last 12 mths 2021   Able to walk? Yes   Fall in past 12 months? 0   Do you feel unsteady? 0   Are you worried about falling 0         Coordination of Care:  1. Have you been to the ER, urgent care clinic since your last visit? Hospitalized since your last visit? Yes; Name: Demond Christine 5/15/2021. Upper GI bloeed    2. Have you seen or consulted any other health care providers outside of the [address] [place] [address] since your last visit? Include any pap smears or colon screening.  No.

## 2021-06-30 ENCOUNTER — TELEPHONE (OUTPATIENT)
Dept: FAMILY MEDICINE CLINIC | Age: 76
End: 2021-06-30

## 2021-06-30 ENCOUNTER — CLINICAL SUPPORT (OUTPATIENT)
Dept: PULMONOLOGY | Age: 76
End: 2021-06-30
Payer: MEDICARE

## 2021-06-30 DIAGNOSIS — R06.00 DYSPNEA, UNSPECIFIED TYPE: ICD-10-CM

## 2021-06-30 PROCEDURE — 94762 N-INVAS EAR/PLS OXIMTRY CONT: CPT | Performed by: INTERNAL MEDICINE

## 2021-06-30 NOTE — TELEPHONE ENCOUNTER
Patient daughter did not answer the phone. Left message stating if patient has a kidney doctor locally or at the [facility]. If not NP Delfina Chen can place a referral and to call the office back.

## 2021-06-30 NOTE — TELEPHONE ENCOUNTER
----- Message from Lori Sheldon NP sent at 6/29/2021  6:10 PM EDT -----  Please call pt or daughter and find out if he has a nephrologist/kidney doctor. Either locally or at the [facility]. If he does not I will place a referral. If he does please find out when his appointment is. Thank you.

## 2021-07-12 LAB
O2 AMOUNT, POCO2: NORMAL
POC PULSE OXIMETRY, POCSPO2: NORMAL

## 2021-10-01 ENCOUNTER — HOSPITAL ENCOUNTER (OUTPATIENT)
Dept: LAB | Age: 76
Discharge: HOME | End: 2021-10-01
Payer: MEDICARE

## 2021-10-01 DIAGNOSIS — I50.22 CHRONIC SYSTOLIC HEART FAILURE (HCC): ICD-10-CM

## 2021-10-01 DIAGNOSIS — N18.4 CHRONIC KIDNEY DISEASE, STAGE IV (SEVERE) (HCC): ICD-10-CM

## 2021-10-01 DIAGNOSIS — E03.9 ACQUIRED HYPOTHYROIDISM: ICD-10-CM

## 2021-10-01 DIAGNOSIS — R42 ORTHOSTATIC DIZZINESS: ICD-10-CM

## 2021-10-01 DIAGNOSIS — E55.9 VITAMIN D DEFICIENCY: ICD-10-CM

## 2021-10-01 DIAGNOSIS — D50.8 IRON DEFICIENCY ANEMIA SECONDARY TO INADEQUATE DIETARY IRON INTAKE: ICD-10-CM

## 2021-10-01 DIAGNOSIS — E78.2 MIXED HYPERLIPIDEMIA: ICD-10-CM

## 2021-10-01 LAB
25(OH)D3 SERPL-MCNC: 50.8 NG/ML (ref 30–100)
ALBUMIN SERPL-MCNC: 3.3 G/DL (ref 3.4–5)
ALBUMIN/GLOB SERPL: 0.8 {RATIO} (ref 0.8–1.7)
ALP SERPL-CCNC: 121 U/L (ref 45–117)
ALT SERPL-CCNC: 34 U/L (ref 16–61)
ANION GAP SERPL CALC-SCNC: 7 MMOL/L (ref 3–18)
AST SERPL-CCNC: 20 U/L (ref 10–38)
BILIRUB SERPL-MCNC: 0.5 MG/DL (ref 0.2–1)
BUN SERPL-MCNC: 46 MG/DL (ref 7–18)
BUN/CREAT SERPL: 14 (ref 12–20)
CALCIUM SERPL-MCNC: 8.7 MG/DL (ref 8.5–10.1)
CHLORIDE SERPL-SCNC: 116 MMOL/L (ref 100–111)
CHOLEST SERPL-MCNC: 74 MG/DL
CO2 SERPL-SCNC: 20 MMOL/L (ref 21–32)
CREAT SERPL-MCNC: 3.4 MG/DL (ref 0.6–1.3)
ERYTHROCYTE [DISTWIDTH] IN BLOOD BY AUTOMATED COUNT: 19.4 % (ref 11.6–14.5)
GLOBULIN SER CALC-MCNC: 3.9 G/DL (ref 2–4)
GLUCOSE SERPL-MCNC: 149 MG/DL (ref 74–99)
HCT VFR BLD AUTO: 38.1 % (ref 36–48)
HDLC SERPL-MCNC: 23 MG/DL (ref 40–60)
HDLC SERPL: 3.2 {RATIO} (ref 0–5)
HGB BLD-MCNC: 11.3 G/DL (ref 13–16)
LDLC SERPL CALC-MCNC: 26 MG/DL (ref 0–100)
LIPID PROFILE,FLP: ABNORMAL
MCH RBC QN AUTO: 25.2 PG (ref 24–34)
MCHC RBC AUTO-ENTMCNC: 29.7 G/DL (ref 31–37)
MCV RBC AUTO: 84.9 FL (ref 78–100)
PLATELET # BLD AUTO: 156 K/UL (ref 135–420)
PMV BLD AUTO: 10 FL (ref 9.2–11.8)
POTASSIUM SERPL-SCNC: 5.3 MMOL/L (ref 3.5–5.5)
PROT SERPL-MCNC: 7.2 G/DL (ref 6.4–8.2)
RBC # BLD AUTO: 4.49 M/UL (ref 4.35–5.65)
SODIUM SERPL-SCNC: 143 MMOL/L (ref 136–145)
T4 FREE SERPL-MCNC: 1.5 NG/DL (ref 0.7–1.5)
TRIGL SERPL-MCNC: 125 MG/DL (ref ?–150)
TSH SERPL DL<=0.05 MIU/L-ACNC: 0.93 UIU/ML (ref 0.36–3.74)
VLDLC SERPL CALC-MCNC: 25 MG/DL
WBC # BLD AUTO: 5.8 K/UL (ref 4.6–13.2)

## 2021-10-01 PROCEDURE — 82306 VITAMIN D 25 HYDROXY: CPT

## 2021-10-01 PROCEDURE — 36415 COLL VENOUS BLD VENIPUNCTURE: CPT

## 2021-10-01 PROCEDURE — 80053 COMPREHEN METABOLIC PANEL: CPT

## 2021-10-01 PROCEDURE — 80061 LIPID PANEL: CPT

## 2021-10-01 PROCEDURE — 84443 ASSAY THYROID STIM HORMONE: CPT

## 2021-10-01 PROCEDURE — 84439 ASSAY OF FREE THYROXINE: CPT

## 2021-10-01 PROCEDURE — 85027 COMPLETE CBC AUTOMATED: CPT

## 2021-10-07 ENCOUNTER — OFFICE VISIT (OUTPATIENT)
Dept: FAMILY MEDICINE CLINIC | Age: 76
End: 2021-10-07
Payer: MEDICARE

## 2021-10-07 VITALS
DIASTOLIC BLOOD PRESSURE: 56 MMHG | HEIGHT: 67 IN | OXYGEN SATURATION: 98 % | RESPIRATION RATE: 20 BRPM | WEIGHT: 161 LBS | TEMPERATURE: 98.7 F | HEART RATE: 62 BPM | BODY MASS INDEX: 25.27 KG/M2 | SYSTOLIC BLOOD PRESSURE: 115 MMHG

## 2021-10-07 DIAGNOSIS — E78.2 MIXED HYPERLIPIDEMIA: ICD-10-CM

## 2021-10-07 DIAGNOSIS — E03.9 ACQUIRED HYPOTHYROIDISM: Primary | ICD-10-CM

## 2021-10-07 DIAGNOSIS — E55.9 VITAMIN D DEFICIENCY: ICD-10-CM

## 2021-10-07 DIAGNOSIS — Z11.59 NEED FOR HEPATITIS C SCREENING TEST: ICD-10-CM

## 2021-10-07 DIAGNOSIS — N18.4 CHRONIC KIDNEY DISEASE, STAGE IV (SEVERE) (HCC): ICD-10-CM

## 2021-10-07 PROCEDURE — G8419 CALC BMI OUT NRM PARAM NOF/U: HCPCS | Performed by: NURSE PRACTITIONER

## 2021-10-07 PROCEDURE — 99214 OFFICE O/P EST MOD 30 MIN: CPT | Performed by: NURSE PRACTITIONER

## 2021-10-07 PROCEDURE — 1101F PT FALLS ASSESS-DOCD LE1/YR: CPT | Performed by: NURSE PRACTITIONER

## 2021-10-07 PROCEDURE — G8536 NO DOC ELDER MAL SCRN: HCPCS | Performed by: NURSE PRACTITIONER

## 2021-10-07 PROCEDURE — G8427 DOCREV CUR MEDS BY ELIG CLIN: HCPCS | Performed by: NURSE PRACTITIONER

## 2021-10-07 PROCEDURE — G8432 DEP SCR NOT DOC, RNG: HCPCS | Performed by: NURSE PRACTITIONER

## 2021-10-07 PROCEDURE — 3017F COLORECTAL CA SCREEN DOC REV: CPT | Performed by: NURSE PRACTITIONER

## 2021-10-07 RX ORDER — FUROSEMIDE 40 MG/1
TABLET ORAL
COMMUNITY
Start: 2021-07-06 | End: 2021-10-12 | Stop reason: SDUPTHER

## 2021-10-07 NOTE — PROGRESS NOTES
Jimmy               [address]               [phone]      Yusra Malone is a 78 y.o. male and presents with Follow Up Chronic Condition, Anemia, Hypothyroidism, and Hypertension       Assessment/Plan:    Diagnoses and all orders for this visit:    1. Acquired hypothyroidism  -     TSH 3RD GENERATION; Future  -     T4, FREE; Future  Endorses medication compliance, Follow up labs prior to next visit and Denies signs and symptoms of hyper or hypothyroidism   2. Mixed hyperlipidemia  -     LIPID PANEL; Future  Endorses medication compliance, Follow up labs prior to next visit and Denies abdominal pain or symptoms of myalgia   3. Chronic kidney disease, stage IV (severe) (HCC)  -     METABOLIC PANEL, COMPREHENSIVE; Future  Endorses medication compliance, Follow up labs prior to next visit and managed by [facility]   4. Vitamin D deficiency  -     VITAMIN D, 25 HYDROXY; Future  Follow up labs prior to next visit   5. Need for hepatitis C screening test  -     HEPATITIS C AB; Future  Health maintenance needs addressed     Called Daughter, Jules Schooling, per patient request, and reviewed office visit. Follow-up and Dispositions    · Return in 6 months (on 4/7/2022) for [address] [place] [address], HTN, hypothyroid, f/u: neph, card, neuro, 30 min, office only, with, labs prior.            Health Maintenance:   Health Maintenance   Topic Date Due    Hepatitis C Screening  Never done    Shingrix Vaccine Age 50> (1 of 2) Never done    Low dose CT lung screening  Never done    AAA Screening 73-67 YO Male Smoking Patients  Never done    Flu Vaccine (1) 09/01/2021    Medicare Yearly Exam  03/18/2022    Lipid Screen  10/01/2022    DTaP/Tdap/Td series (2 - Td or Tdap) 05/23/2023    Colorectal Cancer Screening Combo  05/18/2026    COVID-19 Vaccine  Completed    Pneumococcal 65+ yrs at Risk Vaccine  Completed        Subjective:    Daughter wants to know if the patient should get the flu shot and if the moderna vaccine had booster should he get this? Chanel Mejia know, Yes, I recommend he get both. CKD  Appointment 12/14 with nephrology, last appointment lasix was changed to every other day, [facility] 3.4, was 3.1 post hospital discharge in June  GFR: 18, was 23 post hospital discharge    Seeing cardiology here and the [facility] 8 to the [place] for coverage of watchman procedure, however, he would need to be on anticouagulants 45 day prior and with discussion with GI, decided to much risk to restart anticoagulation at all  Seeing pulmonary here only, [facility] neurology here only, denies seizures    Hypertension:   Patient reports taking medications as instructed. yes   Medication side effects noted. no  Headache upon wakening. no   Home BP monitoring in range of does not remember reading    Do you experience chest pain/pressure or SOB with exertion? no  Maintain a low Sodium diet? yes  Key CAD CHF Meds             furosemide (LASIX) 40 mg tablet (Taking) Take 40 mg by mouth every other day. dilTIAZem ER (CARDIZEM SR) 60 mg capsule Take 1 Capsule by mouth two (2) times a day. metoprolol tartrate (LOPRESSOR) 25 mg tablet Take 25 mg by mouth two (2) times a day. carvediloL (COREG) 12.5 mg tablet carvedilol 12.5 mg tablet   TAKE 1 TABLET BY MOUTH TWICE DAILY WITH MEALS    hydrALAZINE (APRESOLINE) 25 mg tablet hydralazine 25 mg tablet   TAKE 1 TABLET BY MOUTH THREE TIMES DAILY    amiodarone (CORDARONE) 200 mg tablet Take 200 mg by mouth daily. terazosin (HYTRIN) 2 mg capsule Take 2 mg by mouth nightly. atorvastatin (LIPITOR) 40 mg tablet Take 40 mg by mouth daily.         BUN   Date Value Ref Range Status   10/01/2021 46 (H) 7.0 - 18 MG/DL Final     Creatinine   Date Value Ref Range Status   10/01/2021 3.40 (H) 0.6 - 1.3 MG/DL Final     GFR est AA   Date Value Ref Range Status   10/01/2021 22 (L) >60 ml/min/1.73m2 Final     Potassium   Date Value Ref Range Status   10/01/2021 5.3 3.5 - 5.5 mmol/L Final     HLD:  Has been compliant with meds  Yes  Compliant with low-fat diet. most of the time    Denies myalgias or other side effects. yes  The ASCVD Risk score (Brenda Porras, et al., 2013) failed to calculate for the following reasons: The patient has a prior MI or stroke diagnosis    Cholesterol, total   Date Value Ref Range Status   10/01/2021 74 <200 MG/DL Final     Triglyceride   Date Value Ref Range Status   10/01/2021 125 <150 MG/DL Final     Comment:     The drugs N-acetylcysteine (NAC) and  Metamiszole have been found to cause falsely  low results in this chemical assay. Please  be sure to submit blood samples obtained  BEFORE administration of either of these  drugs to assure correct results. HDL Cholesterol   Date Value Ref Range Status   10/01/2021 23 (L) 40 - 60 MG/DL Final     LDL, calculated   Date Value Ref Range Status   10/01/2021 26 0 - 100 MG/DL Final   ]  Key Antihyperlipidemia Meds             atorvastatin (LIPITOR) 40 mg tablet Take 40 mg by mouth daily. ROS:     ROS  As stated in HPI, otherwise all others negative. The problem list was updated as a part of today's visit. Patient Active Problem List   Diagnosis Code    Systolic heart failure (HCC) I50.20    A-fib (HCC) I48.91    Chronic obstructive pulmonary disease (HCC) J44.9    Seizures (HCC) R56.9    Anemia D64.9    Chronic kidney disease, stage IV (severe) (HCC) N18.4    Hypothyroid E03.9    History of heart valve repair Z98.890    Mixed hyperlipidemia E78.2    Long term current use of anticoagulant therapy Z79.01    Mitral valve disease I05.9    Vitamin D deficiency E55.9    Benign prostatic hyperplasia N40.0    GIB (gastrointestinal bleeding) K92.2    Arthritis of knee, left M17.12       The PSH, FH were reviewed.       SH:  Social History     Tobacco Use    Smoking status: Former Smoker     Packs/day: 1.00 Years: 54.00     Pack years: 54.00     Quit date: 2019     Years since quittin.2    Smokeless tobacco: Never Used   Vaping Use    Vaping Use: Never used   Substance Use Topics    Alcohol use: Not Currently    Drug use: Not on file       Medications/Allergies:  Current Outpatient Medications on File Prior to Visit   Medication Sig Dispense Refill    furosemide (LASIX) 40 mg tablet Take 40 mg by mouth every other day.  [DISCONTINUED] furosemide (LASIX) 40 mg tablet TAKE ONE TABLET BY MOUTH ONCE DAILY FOR FLUID. **NOTE DOSE CHANGE      dilTIAZem ER (CARDIZEM SR) 60 mg capsule Take 1 Capsule by mouth two (2) times a day. 60 Capsule 1    midodrine (PROAMATINE) 10 mg tablet Take 1 Tablet by mouth three (3) times daily (with meals). 270 Tablet 0    metoprolol tartrate (LOPRESSOR) 25 mg tablet Take 25 mg by mouth two (2) times a day.  omeprazole (PRILOSEC) 40 mg capsule Take 40 mg by mouth two (2) times a day.  levETIRAcetam (KEPPRA) 250 mg tablet Take 1 Tab by mouth two (2) times a day. 180 Tab 1    carvediloL (COREG) 12.5 mg tablet carvedilol 12.5 mg tablet   TAKE 1 TABLET BY MOUTH TWICE DAILY WITH MEALS      hydrALAZINE (APRESOLINE) 25 mg tablet hydralazine 25 mg tablet   TAKE 1 TABLET BY MOUTH THREE TIMES DAILY      tiotropium (Spiriva with HandiHaler) 18 mcg inhalation capsule Take 1 Cap by inhalation daily.  albuterol (PROVENTIL HFA, VENTOLIN HFA, PROAIR HFA) 90 mcg/actuation inhaler INHALE 2 PUFFS BY MOUTH EVERY 4 HOURS AS NEEDED FOR SHORTNESS OF BREATH. SHAKE WELL PRIOR TO USE. RINSE INHALER AND LET DRY AFTER EACH USE  AS NEEDED FOR WHEEZING FOR SHORTNESS OF BREATH. SHAKE WELL PRIOR TO USE. RINSE INHALER AND LET DRY AFTER EACH USE  AS NEEDED FOR WHEEZING      amiodarone (CORDARONE) 200 mg tablet Take 200 mg by mouth daily.  tamsulosin (FLOMAX) 0.4 mg capsule Take 0.4 mg by mouth.  (Patient not taking: Reported on 2021)      allopurinoL (ZYLOPRIM) 300 mg tablet Take 1 Tab by mouth daily. 30 Tab 0    ascorbic acid, vitamin C, (Vitamin C) 250 mg tablet Take  by mouth.  zinc sulfate (ZINC-15 PO) Take  by mouth.  terazosin (HYTRIN) 2 mg capsule Take 2 mg by mouth nightly.  potassium chloride SA (MICRO-K) 10 mEq capsule Take 10 mEq by mouth two (2) times a day. (Patient not taking: Reported on 6/28/2021)      atorvastatin (LIPITOR) 40 mg tablet Take 40 mg by mouth daily.  cholecalciferol, vitamin D3, 2,000 unit tab Take 2,000 Units by mouth daily.  ferrous sulfate 325 mg (65 mg iron) tablet Take 65 mg by mouth daily.  levothyroxine (SYNTHROID) 112 mcg tablet Take 112 mcg by mouth Daily (before breakfast).  [DISCONTINUED] aspirin delayed-release 81 mg tablet Take 81 mg by mouth daily. (Patient not taking: Reported on 6/28/2021)       No current facility-administered medications on file prior to visit. Allergies   Allergen Reactions    Ace Inhibitors Angioedema    Lisinopril Angioedema       Objective:  Visit Vitals  BP (!) 115/56 (BP 1 Location: Left arm, BP Patient Position: Sitting, BP Cuff Size: Adult)   Pulse 62   Temp 98.7 °F (37.1 °C) (Temporal)   Resp 20   Ht 5' 7\" (1.702 m)   Wt 161 lb (73 kg)   SpO2 98%   BMI 25.22 kg/m²    Body mass index is 25.22 kg/m². Physical assessment  Physical Exam  Vitals and nursing note reviewed. Eyes:      Conjunctiva/sclera: Conjunctivae normal.      Pupils: Pupils are equal, round, and reactive to light. Neck:      Vascular: No JVD. Cardiovascular:      Rate and Rhythm: Normal rate and regular rhythm. Heart sounds: Normal heart sounds. No murmur heard. No friction rub. No gallop. Pulmonary:      Effort: Pulmonary effort is normal.      Breath sounds: Normal breath sounds. Musculoskeletal:         General: Normal range of motion. Cervical back: Normal range of motion. Skin:     General: Skin is warm and dry.    Neurological:      Mental Status: He is alert and oriented to person, place, and time. Psychiatric:         Mood and Affect: Affect normal.         Cognition and Memory: Memory normal.         Judgment: Judgment normal.           Labwork and Ancillary Studies:    CBC w/Diff  Lab Results   Component Value Date/Time    WBC 5.8 10/01/2021 09:42 AM    HGB 11.3 (L) 10/01/2021 09:42 AM    PLATELET 607 94/89/5508 09:42 AM         Basic Metabolic Profile  Lab Results   Component Value Date/Time    Sodium 143 10/01/2021 09:42 AM    Potassium 5.3 10/01/2021 09:42 AM    Chloride 116 (H) 10/01/2021 09:42 AM    CO2 20 (L) 10/01/2021 09:42 AM    Anion gap 7 10/01/2021 09:42 AM    Glucose 149 (H) 10/01/2021 09:42 AM    BUN 46 (H) 10/01/2021 09:42 AM    Creatinine 3.40 (H) 10/01/2021 09:42 AM    BUN/Creatinine ratio 14 10/01/2021 09:42 AM    GFR est AA 22 (L) 10/01/2021 09:42 AM    GFR est non-AA 18 (L) 10/01/2021 09:42 AM    Calcium 8.7 10/01/2021 09:42 AM        Cholesterol  Lab Results   Component Value Date/Time    Cholesterol, total 74 10/01/2021 09:42 AM    HDL Cholesterol 23 (L) 10/01/2021 09:42 AM    LDL, calculated 26 10/01/2021 09:42 AM    Triglyceride 125 10/01/2021 09:42 AM    CHOL/HDL Ratio 3.2 10/01/2021 09:42 AM           I have discussed the diagnosis with the patient and the intended plan as seen in the above orders. The patient has received an After-Visit Summary and questions were answered concerning future plans. An After Visit Summary was printed and given to the patient. All diagnosis have been discussed with the patient and all of the patient's questions have been answered. Follow-up and Dispositions    · Return in 6 months (on 4/7/2022) for [address] [place] [address] Avenue, HTN, hypothyroid, f/u: neph, card, neuro, 30 min, office only, with, labs prior. Colletta Landsberg, [facility]  [facility]   [facility] [address]  [address]

## 2021-10-07 NOTE — PROGRESS NOTES
Room 9    Did patient bring someone? No    Did the patient have DME equipment? No    Did you take your medication today? Yes      1. Have you been to the ER, urgent care clinic since your last visit? Hospitalized since your last visit? No    2. Have you seen or consulted any other health care providers outside of the [address] [place] [address] since your last visit? Include any pap smears or colon screening.  Yes       Health Maintenance Due   Topic Date Due    Hepatitis C Screening  Never done    Shingrix Vaccine Age 50> (1 of 2) Never done    Low dose CT lung screening  Never done    AAA Screening 73-67 YO Male Smoking Patients  Never done    Flu Vaccine (1) 09/01/2021     Patient would like  to discuss Flu vaccine and Moderna Booster     Patient thinks one of his doctors would like for him to start back taking sodium bicarbonate

## 2021-11-16 ENCOUNTER — PATIENT OUTREACH (OUTPATIENT)
Dept: CASE MANAGEMENT | Age: 76
End: 2021-11-16

## 2021-11-16 NOTE — PROGRESS NOTES
.  Complex Case Management      Date/Time:  2021 3:02 PM    Method of communication with patient:phone    [facility] ([facility]) contacted the patient by telephone to perform Ambulatory Care Coordination. Verified name and  (PHI) with patient as identifiers. Provided introduction to self, and explanation of the Ambulatory Care Manager's role. Patient voicing he can't talk now he is on his way to  his daughter. [facility] ask can she call back tomorrow patient said yes.

## 2021-12-01 ENCOUNTER — PATIENT OUTREACH (OUTPATIENT)
Dept: CASE MANAGEMENT | Age: 76
End: 2021-12-01

## 2021-12-01 NOTE — PROGRESS NOTES
.Date/Time:  12/1/2021 3:16 PM    Method of communication with patient:phone    [facility] ( [facility]) made out reach to  patient by telephone to offer Complex Case Management  ( CCM). Provided introduction to self, and explanation of the Ambulatory Care . Contact at [phone] anytime Monday thru Friday 08:00-4:30.

## 2021-12-06 ENCOUNTER — PATIENT OUTREACH (OUTPATIENT)
Dept: CASE MANAGEMENT | Age: 76
End: 2021-12-06

## 2021-12-06 NOTE — PROGRESS NOTES
.Date/Time:  12/6/2021 12:14 PM    Method of communication with patient:phone    [facility] ( [facility]) made third out reach to  patient by telephone to offer Complex Case Management  ( CCM). Provided introduction to self, and explanation of the Ambulatory Care . Contact at [phone] anytime Monday thru Friday 08:00-4:30.

## 2022-01-27 PROBLEM — I25.10 CORONARY ATHEROSCLEROSIS: Status: ACTIVE | Noted: 2022-01-27

## 2022-01-27 RX ORDER — ESOMEPRAZOLE MAGNESIUM 40 MG/1
40 CAPSULE, DELAYED RELEASE ORAL DAILY
COMMUNITY
End: 2022-02-15

## 2022-01-28 ENCOUNTER — OFFICE VISIT (OUTPATIENT)
Dept: PULMONOLOGY | Age: 77
End: 2022-01-28

## 2022-01-28 VITALS
RESPIRATION RATE: 16 BRPM | HEART RATE: 80 BPM | HEIGHT: 67 IN | BODY MASS INDEX: 24.01 KG/M2 | WEIGHT: 153 LBS | DIASTOLIC BLOOD PRESSURE: 62 MMHG | TEMPERATURE: 96.9 F | OXYGEN SATURATION: 99 % | SYSTOLIC BLOOD PRESSURE: 112 MMHG

## 2022-01-28 DIAGNOSIS — I27.20 PULMONARY HYPERTENSION (HCC): ICD-10-CM

## 2022-01-28 DIAGNOSIS — Z87.891 PERSONAL HISTORY OF TOBACCO USE, PRESENTING HAZARDS TO HEALTH: ICD-10-CM

## 2022-01-28 DIAGNOSIS — J44.9 COPD, GROUP B, BY GOLD 2017 CLASSIFICATION (HCC): Primary | ICD-10-CM

## 2022-01-28 PROCEDURE — 94727 GAS DIL/WSHOT DETER LNG VOL: CPT | Performed by: INTERNAL MEDICINE

## 2022-01-28 PROCEDURE — 3017F COLORECTAL CA SCREEN DOC REV: CPT | Performed by: INTERNAL MEDICINE

## 2022-01-28 PROCEDURE — G8420 CALC BMI NORM PARAMETERS: HCPCS | Performed by: INTERNAL MEDICINE

## 2022-01-28 PROCEDURE — G8427 DOCREV CUR MEDS BY ELIG CLIN: HCPCS | Performed by: INTERNAL MEDICINE

## 2022-01-28 PROCEDURE — G0296 VISIT TO DETERM LDCT ELIG: HCPCS | Performed by: INTERNAL MEDICINE

## 2022-01-28 PROCEDURE — 1101F PT FALLS ASSESS-DOCD LE1/YR: CPT | Performed by: INTERNAL MEDICINE

## 2022-01-28 PROCEDURE — 94060 EVALUATION OF WHEEZING: CPT | Performed by: INTERNAL MEDICINE

## 2022-01-28 PROCEDURE — G8536 NO DOC ELDER MAL SCRN: HCPCS | Performed by: INTERNAL MEDICINE

## 2022-01-28 PROCEDURE — G8510 SCR DEP NEG, NO PLAN REQD: HCPCS | Performed by: INTERNAL MEDICINE

## 2022-01-28 PROCEDURE — 99214 OFFICE O/P EST MOD 30 MIN: CPT | Performed by: INTERNAL MEDICINE

## 2022-01-28 PROCEDURE — 94729 DIFFUSING CAPACITY: CPT | Performed by: INTERNAL MEDICINE

## 2022-01-28 RX ORDER — SODIUM BICARBONATE 650 MG/1
650 TABLET ORAL 3 TIMES DAILY
COMMUNITY
Start: 2021-10-26

## 2022-01-28 NOTE — PROGRESS NOTES
Radha Forman presents today for   Chief Complaint   Patient presents with    Shortness of Breath     follow up from 6/28/2021    Other     dyspnea, pulmonary HTN    Results     overnight POx 6/30/2021       Is someone accompanying this pt? No    Is the patient using any DME equipment during OV? No   -DME Company N/A    Depression Screening:  3 most recent PHQ Screens 1/28/2022   Little interest or pleasure in doing things Not at all   Feeling down, depressed, irritable, or hopeless Not at all   Total Score PHQ 2 0       Learning Assessment:  Learning Assessment 1/27/2020   PRIMARY LEARNER Patient   HIGHEST LEVEL OF EDUCATION - PRIMARY LEARNER  4 YEARS OF COLLEGE   BARRIERS PRIMARY LEARNER NONE   CO-LEARNER CAREGIVER No   PRIMARY LANGUAGE ENGLISH   LEARNER PREFERENCE PRIMARY DEMONSTRATION   ANSWERED BY Melodie Beltran   RELATIONSHIP SELF       Abuse Screening:  Abuse Screening Questionnaire 1/28/2022   Do you ever feel afraid of your partner? N   Are you in a relationship with someone who physically or mentally threatens you? N   Is it safe for you to go home? Y       Fall Risk  Fall Risk Assessment, last 12 mths 1/28/2022   Able to walk? Yes   Fall in past 12 months? 0   Do you feel unsteady? 0   Are you worried about falling -         Coordination of Care:  1. Have you been to the ER, urgent care clinic since your last visit? Hospitalized since your last visit? No    2. Have you seen or consulted any other health care providers outside of the [address] [place] [address] since your last visit? Include any pap smears or colon screening. No     Influenza vaccine offered but declined at this time.

## 2022-01-28 NOTE — PROGRESS NOTES
HISTORY OF PRESENT ILLNESS  Valentina Nava is a 78 y.o. male referred for shortness of breath. Pt's symptoms started in 2021 with note of shortness of breath with mild exertion resulting in multiple hospital and ED visits. [facility] pt was sent to the ED with severe symptomatic anemia. Extensive workup included upper and lower endoscopies which revealed reflux esophagitis, hiatal hernia and non bleeding duodenal diverticulum. Coloscopy showed polyps and diverticulosis, no active bleeding. Pt was also noted to be hypoxemic with a CT chest significant for pulmonary edema, small pleural effusions, passive atelectasis, new ascites. Pt was treated for possible heart failure and lost ~30 lbs on discharge, wome of which he has gained back. He denies chest pain, fever or hemoptysis. Pt himself denies wheezing although he was told that his family hears him wheeze. Pt currently denies dyspnea, denies any exercise limitation. His wife reports audible wheezing which pt denies as he is [place]. PMH includes CVA in 2002 and an MI in 2006. Pt had a MV repair at [facility]. Review of Systems   Constitutional: Positive for weight loss. Negative for chills and diaphoresis. Malaise/fatigue: resolved. HENT: Negative for congestion, ear discharge, hearing loss, nosebleeds, sinus pain and tinnitus. Eyes: Negative for blurred vision, double vision, photophobia, pain, discharge and redness. Respiratory: Negative for hemoptysis, sputum production and stridor. Cough: occasional. Shortness of breath: occasional. Wheezing: rare. Cardiovascular: Positive for leg swelling. Negative for chest pain, palpitations, orthopnea, claudication and PND. Gastrointestinal: Negative for abdominal pain, blood in stool, constipation, diarrhea, heartburn, melena, nausea and vomiting. Genitourinary: Negative for dysuria, flank pain, frequency, hematuria and urgency.         Nocturia 2-3 times per night Musculoskeletal: Negative for back pain, falls, joint pain, myalgias and neck pain. Skin: Negative for itching. Neurological: Negative for tingling, tremors, sensory change, focal weakness, seizures, loss of consciousness, weakness and headaches. Dizziness: resolved. Speech change: chronic post CVA. Endo/Heme/Allergies: Negative for environmental allergies and polydipsia. Does not bruise/bleed easily. Psychiatric/Behavioral: Negative for depression, hallucinations, memory loss, substance abuse and suicidal ideas. The patient is not nervous/anxious and does not have insomnia. Past Medical History:   Diagnosis Date    A-fib [facility])     Chronic obstructive pulmonary disease ([facility]     Hypertension     MI (myocardial infarction) ([facility] [address] [facility]     Seizures ([facility] [address]     2002    Stroke ([facility] [address]     2002     Past Surgical History:   Procedure Laterality Date    CT CARDIAC SURG PROCEDURE UNLIST       Current Outpatient Medications on File Prior to Visit   Medication Sig Dispense Refill    sodium bicarbonate 650 mg tablet Take 650 mg by mouth daily.  esomeprazole (NEXIUM) 40 mg capsule Take 40 mg by mouth daily.  dilTIAZem ER (CARDIZEM SR) 60 mg capsule Take 1 Capsule by mouth two (2) times a day. (Patient taking differently: Take 120 mg by mouth daily.) 60 Capsule 1    midodrine (PROAMATINE) 10 mg tablet Take 1 Tablet by mouth three (3) times daily (with meals). 270 Tablet 0    metoprolol tartrate (LOPRESSOR) 25 mg tablet Take 25 mg by mouth two (2) times a day.  levETIRAcetam (KEPPRA) 250 mg tablet Take 1 Tab by mouth two (2) times a day. 180 Tab 1    tiotropium (Spiriva with HandiHaler) 18 mcg inhalation capsule Take 1 Cap by inhalation daily.  albuterol (PROVENTIL HFA, VENTOLIN HFA, PROAIR HFA) 90 mcg/actuation inhaler INHALE 2 PUFFS BY MOUTH EVERY 4 HOURS AS NEEDED FOR SHORTNESS OF BREATH.  SHAKE WELL PRIOR TO USE. RINSE INHALER AND LET DRY AFTER EACH USE  AS NEEDED FOR WHEEZING FOR SHORTNESS OF BREATH. SHAKE WELL PRIOR TO USE. RINSE INHALER AND LET DRY AFTER EACH USE  AS NEEDED FOR WHEEZING      amiodarone (CORDARONE) 200 mg tablet Take 200 mg by mouth daily.  furosemide (LASIX) 40 mg tablet Take 20 mg by mouth every other day.  terazosin (HYTRIN) 2 mg capsule Take 2 mg by mouth nightly.  atorvastatin (LIPITOR) 40 mg tablet Take 40 mg by mouth nightly.  cholecalciferol, vitamin D3, 2,000 unit tab Take 1,000 Units by mouth daily.  ferrous sulfate 325 mg (65 mg iron) tablet Take 65 mg by mouth daily.  levothyroxine (SYNTHROID) 112 mcg tablet Take 112 mcg by mouth Daily (before breakfast).  omeprazole (PRILOSEC) 40 mg capsule Take 40 mg by mouth two (2) times a day. (Patient not taking: Reported on 1/28/2022)      carvediloL (COREG) 12.5 mg tablet carvedilol 12.5 mg tablet   TAKE 1 TABLET BY MOUTH TWICE DAILY WITH MEALS (Patient not taking: Reported on 1/28/2022)      hydrALAZINE (APRESOLINE) 25 mg tablet hydralazine 25 mg tablet   TAKE 1 TABLET BY MOUTH THREE TIMES DAILY (Patient not taking: Reported on 1/28/2022)      tamsulosin (FLOMAX) 0.4 mg capsule Take 0.4 mg by mouth. (Patient not taking: Reported on 6/28/2021)      allopurinoL (ZYLOPRIM) 300 mg tablet Take 1 Tab by mouth daily. (Patient not taking: Reported on 1/28/2022) 30 Tab 0    ascorbic acid, vitamin C, (Vitamin C) 250 mg tablet Take  by mouth. (Patient not taking: Reported on 1/28/2022)      zinc sulfate (ZINC-15 PO) Take  by mouth. (Patient not taking: Reported on 1/28/2022)      potassium chloride SA (MICRO-K) 10 mEq capsule Take 10 mEq by mouth two (2) times a day. (Patient not taking: Reported on 6/28/2021)       No current facility-administered medications on file prior to visit.      Allergies   Allergen Reactions    Ace Inhibitors Angioedema    Lisinopril Angioedema     Family History   Problem Relation Age of Onset    Heart Disease Father     Heart Attack Father     Alcohol abuse Father  Diabetes Maternal Grandfather      Social History     Socioeconomic History    Marital status:      Spouse name: Not on file    Number of children: Not on file    Years of education: Not on file    Highest education level: Not on file   Occupational History    Not on file   Tobacco Use    Smoking status: Former Smoker     Packs/day: 1.00     Years: 54.00     Pack years: 54.00     Quit date: 2019     Years since quittin.5    Smokeless tobacco: Never Used   Vaping Use    Vaping Use: Never used   Substance and Sexual Activity    Alcohol use: Not Currently    Drug use: Not on file    Sexual activity: Yes   Other Topics Concern    Not on file   Social History Narrative    Not on file     Social Determinants of Health     Financial Resource Strain:     Difficulty of Paying Living Expenses: Not on file   Food Insecurity:     Worried About Running Out of Food in the Last Year: Not on file    Ce of Food in the Last Year: Not on file   Transportation Needs:     Lack of Transportation (Medical): Not on file    Lack of Transportation (Non-Medical):  Not on file   Physical Activity:     Days of Exercise per Week: Not on file    Minutes of Exercise per Session: Not on file   Stress:     Feeling of Stress : Not on file   Social Connections:     Frequency of Communication with Friends and Family: Not on file    Frequency of Social Gatherings with Friends and Family: Not on file    Attends Caodaism Services: Not on file    Active Member of Clubs or Organizations: Not on file    Attends Club or Organization Meetings: Not on file    Marital Status: Not on file   Intimate Partner Violence:     Fear of Current or Ex-Partner: Not on file    Emotionally Abused: Not on file    Physically Abused: Not on file    Sexually Abused: Not on file   Housing Stability:     Unable to Pay for Housing in the Last Year: Not on file    Number of [place] in the Last Year: Not on file    Unstable Housing in the Last Year: Not on file     Blood pressure 112/62, pulse 80, temperature 96.9 °F (36.1 °C), temperature source Oral, resp. rate 16, height 5' 7\" (1.702 m), weight 69.4 kg (153 lb), SpO2 99 %. Physical Exam  Constitutional:       General: He is not in acute distress. Appearance: Normal appearance. He is not ill-appearing, toxic-appearing or diaphoretic. HENT:      Head: Normocephalic and atraumatic. Right Ear: External ear normal.      Left Ear: External ear normal.      Nose:      Comments: Deferred      Mouth/Throat:      Comments: Deferred   Eyes:      General: No scleral icterus. Right eye: No discharge. Left eye: No discharge. Extraocular Movements: Extraocular movements intact. Conjunctiva/sclera: Conjunctivae normal.      Pupils: Pupils are equal, round, and reactive to light. Neck:      Vascular: No carotid bruit. Cardiovascular:      Rate and Rhythm: Normal rate. Rhythm irregular. Pulses: Normal pulses. Heart sounds: Normal heart sounds. Murmur: systolic 2/6 unchanged. No gallop. Pulmonary:      Effort: Pulmonary effort is normal. No respiratory distress. Breath sounds: No stridor. No rhonchi or rales. Wheezes: bilateral apical.      Comments: Distant breath sounds  Chest:      Chest wall: No tenderness. Abdominal:      Palpations: Abdomen is soft. There is no mass. Tenderness: There is no abdominal tenderness. Musculoskeletal:         General: No swelling, tenderness, deformity or signs of injury. Cervical back: No rigidity or tenderness. Right lower leg: Right lower leg edema: trace. Left lower leg: Left lower leg edema: trace. Lymphadenopathy:      Cervical: No cervical adenopathy. Skin:     General: Skin is warm and dry. Coloration: Skin is not jaundiced or pale. Findings: No erythema, lesion or rash. Bruising: small ecchymoses both UE.    Neurological:      General: No focal deficit present. Mental Status: He is alert and oriented to person, place, and time. Coordination: Coordination normal.   Psychiatric:         Mood and Affect: Mood normal.         Behavior: Behavior normal.         Thought Content: Thought content normal.         Judgment: Judgment normal.         Component 10 mo ago   EF Echo  55      Narrative    CONCLUSIONS     * Left ventricular systolic function is normal with an ejection fraction of   55 % by visual estimation.     * There is concentric left ventricular hypertrophy with a mildly thickened   septal wall and mildly thickened posterior wall.     * The left ventricular diastolic function is indeterminate.     * Right ventricular chamber dimension is enlarged.     * Right ventricular systolic function is low normal..     * Right atrial chamber volume is enlarged.     * Left atrial chamber is severely enlarged with a left atrial volume index   of 59.07 ml/m^2 by BP MOD.     * Mitral annular ring present.  Stable mitral valve repair with mild   residual regurgitation.     * There is mild tricuspid valve regurgitation.     * Moderate pulmonary hypertension, estimated pulmonary arterial systolic   pressure is 66 mmHg.     * Bubble study was performed and was negative for shunt. Comparison     * Compared to prior study from 2019: EF 75%, Reduced RV function, Mild   TR, PAP 53 mmHg.        Patient Info   Name:     Nury Zaragoza   Age:     78 years   :     1945   Gender:     Male   MRN:     [id number]   BSA:     1.98 m2   BP:     107   /     70 mmHg   Exam Date:     2021 8:03 AM   Site Location:     [facility]   Exam Location:     [facility]     Exam Type:     ECHO PER STROKE PROTOCOL     Indications        - TIA       Left Ventricle     Left ventricular systolic function is normal with an ejection fraction of 55   % by visual estimation.     Left ventricular chamber volume is normal.     There is concentric left ventricular hypertrophy with a mildly thickened   septal wall and mildly thickened posterior wall.     The left ventricular diastolic function is indeterminate. Right Ventricle     Right ventricular systolic function is low normal.     Right ventricular chamber dimension is enlarged. Left Atrium     Left atrial chamber is severely enlarged with a left atrial volume index of   59.07 ml/m^2 by BP MOD. Right Atrium     Right atrial chamber volume is enlarged. Atrial Septum     Bubble study was performed and was negative for shunt. Aortic Valve     The aortic valve is tricuspid.     There is no aortic valve stenosis.     There is mild diffuse thickening (sclerosis) of the aortic valve cusps.     There is trace aortic valve regurgitation. Pulmonic Valve     The pulmonic valve is normal.     There is no pulmonic valve stenosis.     There is trace pulmonic regurgitation. Mitral Valve     Mitral annular ring present.  Stable mitral valve repair with mild residual   regurgitation.     There is mild mitral valve regurgitation. Tricuspid Valve     The tricuspid valve leaflets are normal.     There is no tricuspid valve stenosis.     There is mild tricuspid valve regurgitation.     Moderate pulmonary hypertension, estimated pulmonary arterial systolic   pressure TA 29 mmHg. Pericardium/Pleural     There is no pericardial effusion. Inferior Vena Cava     Dilated inferior vena cava with <50% collapse upon inspiration consistent   with elevated right atrial pressure, 15 mmHg. Aorta     The aortic root at the sinus of Valsalva is normal measuring 2.8 cm with an   index of 1.41 cm/m2.     The aortic measurements are indexed to age and body surface area.        Mitral Valve   ----------------------------------------------------------------------   Name                                 Value        Normal   ----------------------------------------------------------------------     MV Doppler ----------------------------------------------------------------------   MV Mean Gradient                    6 mmHg                 MV Decel [place]                   791 cm/s2                 MV PHT                               72 ms                 MV Area (PHT)                     3.06 cm2     7.07-1.07     MV Diastolic Function   ----------------------------------------------------------------------   IVRT                                 74 ms     Tricuspid Valve   ----------------------------------------------------------------------   Name                                 Value        Normal   ----------------------------------------------------------------------     TV Regurgitation Doppler   ----------------------------------------------------------------------   TR Peak Velocity               358.00 cm/s      <=280.00     Estimated PAP/RSVP   ----------------------------------------------------------------------   RA Pressure                        15 mmHg            <8   PA Systolic Pressure               66 mmHg           <97   RV Systolic Pressure               66 mmHg           <36     Aorta   ----------------------------------------------------------------------   Name                                 Value        Normal   ----------------------------------------------------------------------     Ascending Aorta   ----------------------------------------------------------------------   Sinus of Valsalva Diameter          2.8 cm         <=3.7   Sinus of Valsalva Index         1.41 cm/m2        <=1.90     Ventricles   ----------------------------------------------------------------------   Name                                 Value        Normal   ----------------------------------------------------------------------     LV Dimensions 2D/MM   ----------------------------------------------------------------------   IVS Diastolic Thickness (2D)       1.10 cm     0.60-1.04   LVID Diastole (2D)                 4.70 cm     6.20-9.53   LVPW Diastolic Thickness   (2D)                               1.10 cm     0.60-1.04   LVID Systole (2D)                  3.02 cm     9.13-1.49   LVID Diastolic Index (2D)       2.37 cm/m2     2.20-3.00   LV Mass (2D Cubed)                187.54 g  88..00   LV Mass Index (2D Cubed)        0.01 g/cm2     0.00-0.01   Relative Wall Thickness (2D)          0.47     Atria   ----------------------------------------------------------------------   Name                                 Value        Normal   ----------------------------------------------------------------------     LA Dimensions   ----------------------------------------------------------------------   LA Dimension (2D)                  4.50 cm     3.00-4.10   LA Dimen Index (2D)             2.27 cm/m2                 LA Volume (BP MOD)               117.00 ml                 LA Volume Index (BP MOD)       59.07 ml/m2   16.00-34.00       Report Signatures   Finalized by Julianna Kerns MD on 4/2/2021 11:37 AM       Technical Quality:     Fair     Staff   Ordering Provider:     MANDY NARANJO     Complete two-dimensional, color flow and Doppler transthoracic echocardiogram   is performed with agitated saline. [id number]     Sonographer:     Brianna DAIGLE     Image Enhancing Agent/Agitated Saline     ------------------------------   Imaging Agent/Ag. Saline:     Agitated Saline   Amount:     --- ml    Other Result Text    This result has an attachment that is not available. Teodora Still - 04/02/2021 11:38 AM EDT   Formatting of this note might be different from the original.   CONCLUSIONS     * Left ventricular systolic function is normal with an ejection fraction of   55 % by visual estimation.     * There is concentric left ventricular hypertrophy with a mildly thickened   septal wall and mildly thickened posterior wall.     * The left ventricular diastolic function is indeterminate.     * Right ventricular chamber dimension is enlarged.     * Right ventricular systolic function is low normal..     * Right atrial chamber volume is enlarged.     * Left atrial chamber is severely enlarged with a left atrial volume index   of 59.07 ml/m^2 by BP MOD.     * Mitral annular ring present.  Stable mitral valve repair with mild   residual regurgitation.     * There is mild tricuspid valve regurgitation.     * Moderate pulmonary hypertension, estimated pulmonary arterial systolic   pressure is 66 mmHg.     * Bubble study was performed and was negative for shunt. Comparison     * Compared to prior study from 2019: EF 75%, Reduced RV function, Mild   TR, PAP 53 mmHg. Patient Info   Name:     YULISA AGRAWAL   Age:     78 years   :     1945   Gender:     Male   MRN:     [id number]   BSA:     1.98 m2   BP:     107   /     70 mmHg   Exam Date:     2021 8:03 AM   Site Location:     [facility]   Exam Location:     [facility]     Exam Type:     ECHO PER STROKE PROTOCOL     Indications        - TIA       Left Ventricle     Left ventricular systolic function is normal with an ejection fraction of 55   % by visual estimation.     Left ventricular chamber volume is normal.     There is concentric left ventricular hypertrophy with a mildly thickened   septal wall and mildly thickened posterior wall.     The left ventricular diastolic function is indeterminate. Right Ventricle     Right ventricular systolic function is low normal.     Right ventricular chamber dimension is enlarged. Left Atrium     Left atrial chamber is severely enlarged with a left atrial volume index of   59.07 ml/m^2 by BP MOD. Right Atrium     Right atrial chamber volume is enlarged. Atrial Septum     Bubble study was performed and was negative for shunt.        Aortic Valve     The aortic valve is tricuspid.     There is no aortic valve stenosis.     There is mild diffuse thickening (sclerosis) of the aortic valve cusps.     There is trace aortic valve regurgitation. Pulmonic Valve     The pulmonic valve is normal.     There is no pulmonic valve stenosis.     There is trace pulmonic regurgitation. Mitral Valve     Mitral annular ring present.  Stable mitral valve repair with mild residual   regurgitation.     There is mild mitral valve regurgitation. Tricuspid Valve     The tricuspid valve leaflets are normal.     There is no tricuspid valve stenosis.     There is mild tricuspid valve regurgitation.     Moderate pulmonary hypertension, estimated pulmonary arterial systolic   pressure is 66 mmHg. Pericardium/Pleural     There is no pericardial effusion. Inferior Vena Cava     Dilated inferior vena cava with <50% collapse upon inspiration consistent   with elevated right atrial pressure, 15 mmHg. Aorta     The aortic root at the sinus of Valsalva is normal measuring 2.8 cm with an   index of 1.41 cm/m2.     The aortic measurements are indexed to age and body surface area.        Mitral Valve   ----------------------------------------------------------------------   Name                                 Value        Normal   ----------------------------------------------------------------------     MV Doppler   ----------------------------------------------------------------------   MV Mean Gradient                    6 mmHg                 MV Decel [place]                   791 cm/s2                 MV PHT                               72 ms                 MV Area (PHT)                     3.06 cm2     1.06-6.04     MV Diastolic Function   ----------------------------------------------------------------------   IVRT                                 74 ms     Tricuspid Valve   ----------------------------------------------------------------------   Name                                 Value        Normal   ----------------------------------------------------------------------     TV Regurgitation Doppler ----------------------------------------------------------------------   TR Peak Velocity               358.00 cm/s      <=280.00     Estimated PAP/RSVP   ----------------------------------------------------------------------   RA Pressure                        15 mmHg            <8   PA Systolic Pressure               66 mmHg           <89   RV Systolic Pressure               66 mmHg           <36     Aorta   ----------------------------------------------------------------------   Name                                 Value        Normal   ----------------------------------------------------------------------     Ascending Aorta   ----------------------------------------------------------------------   Sinus of Valsalva Diameter          2.8 cm         <=3.7   Sinus of Valsalva Index         1.41 cm/m2        <=1.90     Ventricles   ----------------------------------------------------------------------   Name                                 Value        Normal   ----------------------------------------------------------------------     LV Dimensions 2D/MM   ----------------------------------------------------------------------   IVS Diastolic Thickness (2D)       1.10 cm     0.60-1.04   LVID Diastole (2D)                 4.70 cm     1.33-0.02   LVPW Diastolic Thickness   (2D)                               1.10 cm     0.60-1.04   LVID Systole (2D)                  3.02 cm     4.74-1.08   LVID Diastolic Index (2D)       2.37 cm/m2     2.20-3.00   LV Mass (2D Cubed)                187.54 g  88..00   LV Mass Index (2D Cubed)        0.01 g/cm2     0.00-0.01   Relative Wall Thickness (2D)          0.47     Atria   ----------------------------------------------------------------------   Name                                 Value        Normal   ----------------------------------------------------------------------     LA Dimensions   ----------------------------------------------------------------------   LA Dimension (2D)                  4.50 cm     3.00-4.10   LA Dimen Index (2D)             2.27 cm/m2                 LA Volume (BP MOD)               117.00 ml                 LA Volume Index (BP MOD)       59.07 ml/m2   16.00-34.00       Report Signatures   Finalized by Tasneem Bolden MD on 4/2/2021 11:37 AM       Technical Quality:     Fair     Staff   Ordering Provider:     MANDY NARANJO     Complete two-dimensional, color flow and Doppler transthoracic echocardiogram   is performed with agitated saline. [id number]     Sonographer:     Uriel DAIGLE     Image Enhancing Agent/Agitated Saline     ------------------------------   Imaging Agent/Ag. Saline:     Agitated Saline   Amount:     --- ml  Specimen Collected:    Date: 04/02/21   Received From: [employer] [facility]     PFT: mild obstruction without BD response. Reduced DLCO       ASSESSMENT and PLAN  Encounter Diagnoses   Name Primary?  COPD, group B, by GOLD 2017 classification ([address] Yes    Pulmonary hypertension ([address]     Personal history of tobacco use, presenting hazards to health        Pt with recent hospital admission for SOB and hypoxemia, suspect due to heart failure decompensation, federico with note or edema, ascites, weight gain and loss with diuresis. Pulmonary Hypertension is likely Grp 2 rather than Grp 3 but will R/o with full PFT's. There is no indication for supplemental O2. As obstruction is quite mild and pt is relatively asymptomatic, will treat with prn Albuterol and defer maintenance therapy for now. Management of cardiac issues per cardiology. Reviewed PFT results with pt. Discussed with patient current guidelines for screening for lung cancer. Current recommendations are to obtain yearly screening LDCT yearly for age 46-80, or until smoke free for 15 years. Patient has 54 pack year history of cigarette smoking and currently quit 3 years.   Discussed with patient risks and benefits of screening, including over-diagnosis, false positive rate, and total radiation exposure. Patient currently exhibits no signs or symptoms suggestive of lung cancer. Discussed with patient importance of compliance with yearly annual lung cancer screenings and willingness to undergo diagnosis and treatment if screening scan is positive. In addition, patient was counseled regarding (remaining smoke free/total smoking cessation).

## 2022-02-15 ENCOUNTER — OFFICE VISIT (OUTPATIENT)
Dept: FAMILY MEDICINE CLINIC | Age: 77
End: 2022-02-15
Payer: MEDICARE

## 2022-02-15 VITALS
OXYGEN SATURATION: 99 % | WEIGHT: 167 LBS | HEIGHT: 67 IN | SYSTOLIC BLOOD PRESSURE: 120 MMHG | HEART RATE: 62 BPM | DIASTOLIC BLOOD PRESSURE: 64 MMHG | TEMPERATURE: 98.8 F | RESPIRATION RATE: 20 BRPM | BODY MASS INDEX: 26.21 KG/M2

## 2022-02-15 DIAGNOSIS — N18.4 CHRONIC KIDNEY DISEASE, STAGE IV (SEVERE) (HCC): Primary | ICD-10-CM

## 2022-02-15 DIAGNOSIS — Z09 HOSPITAL DISCHARGE FOLLOW-UP: ICD-10-CM

## 2022-02-15 DIAGNOSIS — R56.9 SEIZURES (HCC): ICD-10-CM

## 2022-02-15 DIAGNOSIS — I48.11 LONGSTANDING PERSISTENT ATRIAL FIBRILLATION (HCC): ICD-10-CM

## 2022-02-15 DIAGNOSIS — I50.22 CHRONIC SYSTOLIC HEART FAILURE (HCC): ICD-10-CM

## 2022-02-15 PROCEDURE — G8536 NO DOC ELDER MAL SCRN: HCPCS | Performed by: NURSE PRACTITIONER

## 2022-02-15 PROCEDURE — 99214 OFFICE O/P EST MOD 30 MIN: CPT | Performed by: NURSE PRACTITIONER

## 2022-02-15 PROCEDURE — 3017F COLORECTAL CA SCREEN DOC REV: CPT | Performed by: NURSE PRACTITIONER

## 2022-02-15 PROCEDURE — G8432 DEP SCR NOT DOC, RNG: HCPCS | Performed by: NURSE PRACTITIONER

## 2022-02-15 PROCEDURE — G8427 DOCREV CUR MEDS BY ELIG CLIN: HCPCS | Performed by: NURSE PRACTITIONER

## 2022-02-15 PROCEDURE — 1101F PT FALLS ASSESS-DOCD LE1/YR: CPT | Performed by: NURSE PRACTITIONER

## 2022-02-15 PROCEDURE — G8419 CALC BMI OUT NRM PARAM NOF/U: HCPCS | Performed by: NURSE PRACTITIONER

## 2022-02-15 NOTE — PROGRESS NOTES
Jimmy               [facility]               [phone]      Jeri Harris is a 78 y.o. male and presents with Hospital Follow Up       Assessment/Plan:    Diagnoses and all orders for this visit:    1. Chronic kidney disease, stage IV (severe) (HCC)  Follow up on hospitalization for acute on chronic renal failure with hyperkalemia and bradycardia  He had temporary dialysis while hospitalized and the potassium was corrected  Will have labs drawn next week by home health per nephrology orders. No needs from me today  2. Longstanding persistent atrial fibrillation ([address]  Assessment & Plan:   monitored by specialist. No acute findings meriting change in the plan      3. Seizures ([address]  Assessment & Plan:   monitored by specialist. No acute findings meriting change in the plan      4. Chronic systolic heart failure ([address]  Assessment & Plan:   monitored by specialist. No acute findings meriting change in the plan      5. Hospital discharge follow-up      Follow-up and Dispositions    · Return in about 3 months (around 5/15/2022) for [facility] [address], [facility], office only, 30 min. Health Maintenance:   Health Maintenance   Topic Date Due    Hepatitis C Screening  Never done    Shingrix Vaccine Age 50> (1 of 2) Never done    Low dose CT lung screening  Never done    AAA Screening 73-69 YO Male Smoking Patients  Never done    Flu Vaccine (1) 09/01/2021    COVID-19 Vaccine (3 - Booster for Moderna series) 09/15/2021    Medicare Yearly Exam  03/18/2022    Lipid Screen  10/01/2022    Depression Screen  01/28/2023    DTaP/Tdap/Td series (2 - Td or Tdap) 05/23/2023    Colorectal Cancer Screening Combo  05/18/2026    Pneumococcal 65+ yrs at Risk Vaccine  Completed        Subjective:    Labs obtained prior to visit? NO  Reviewed with patient?  Not applicable    Concerns for today  His weight is going back up and wants to know if he should restart the furosemide, it was discontinued on discharge. Goes to [facility], Dr. Seven Gomez. He has called them and they are ordering blood work and will decide what to do about the furosemide    Hospitalization summary; Admitted for acute on chronic renal failure with hyperkalemia causing bradycardia. Potassium was 8.2 on admission, he had emergeny dialysis while in the hospital, shae was dc'd prior to [address] Test results:  Echo Cardiogram Complete (02/06/2022 9:34 AM EST)  Echo Cardiogram Complete (02/06/2022 9:34 AM EST)   Component Value Ref Range Performed At Pathologist Signature   EF Echo 74          Date admitted: 2/5/22    Date discharged: 2/11/22    Date of first interactive contact:  Within 2 days of discharge by nurse navigator. NO    Date of face to face: 2/15/22    Medication reconciliation performed: Yes    Medications added/changed/discontinued: Stop cardizem, nexium, lasix, metoprolol,     Review discharge instructions: Yes    Need for follow up on in hospital testing: No    Need for follow up with specialist: Yes  Name of specialist: Renal around 4/5/22 and cardiology around 5/2022    Does patient have help at home: Yes  Name: daughter and receiving home health care: will get labs this week    Barriers to obtaining medications: No    Patient/family educated on cause of hospitalization: Yes    Patient/family able to repeat back cause of hospitalization, disease process, medication changes, and when to seek help: Yes    Patient Past Records were reviewed:  yes    ROS:     ROS  As stated in HPI, otherwise all others negative. The problem list was updated as a part of today's visit.   Patient Active Problem List   Diagnosis Code    Systolic heart failure ([facility]) I50.20    A-fib ([facility]) I48.91    Chronic obstructive pulmonary disease ([facility]) J44.9    Seizures ([facility]) R56.9    Anemia D64.9    Chronic kidney disease, stage IV (severe) ([facility]) N18.4    Hypothyroid E03.9    History of heart valve repair Z98.890    Mixed hyperlipidemia E78.2    Long term current use of anticoagulant therapy Z79.01    Mitral valve disease I05.9    Vitamin D deficiency E55.9    Benign prostatic hyperplasia N40.0    GIB (gastrointestinal bleeding) K92.2    Arthritis of knee, left M17.12    Coronary atherosclerosis I25.10       The PSH, FH were reviewed. SH:  Social History     Tobacco Use    Smoking status: Former Smoker     Packs/day: 1.00     Years: 54.00     Pack years: 54.00     Quit date: 2019     Years since quittin.6    Smokeless tobacco: Never Used   Vaping Use    Vaping Use: Never used   Substance Use Topics    Alcohol use: Not Currently    Drug use: Not on file       Medications/Allergies:  Current Outpatient Medications on File Prior to Visit   Medication Sig Dispense Refill    sodium bicarbonate 650 mg tablet Take 650 mg by mouth three (3) times daily.  midodrine (PROAMATINE) 10 mg tablet Take 1 Tablet by mouth three (3) times daily (with meals). 270 Tablet 0    levETIRAcetam (KEPPRA) 250 mg tablet Take 1 Tab by mouth two (2) times a day. 180 Tab 1    tiotropium (Spiriva with HandiHaler) 18 mcg inhalation capsule Take 1 Cap by inhalation daily.  albuterol (PROVENTIL HFA, VENTOLIN HFA, PROAIR HFA) 90 mcg/actuation inhaler INHALE 2 PUFFS BY MOUTH EVERY 4 HOURS AS NEEDED FOR SHORTNESS OF BREATH. SHAKE WELL PRIOR TO USE. RINSE INHALER AND LET DRY AFTER EACH USE  AS NEEDED FOR WHEEZING FOR SHORTNESS OF BREATH. SHAKE WELL PRIOR TO USE. RINSE INHALER AND LET DRY AFTER EACH USE  AS NEEDED FOR WHEEZING      terazosin (HYTRIN) 2 mg capsule Take 2 mg by mouth nightly.  atorvastatin (LIPITOR) 40 mg tablet Take 40 mg by mouth nightly.  cholecalciferol, vitamin D3, 2,000 unit tab Take 1,000 Units by mouth daily.  ferrous sulfate 325 mg (65 mg iron) tablet Take 65 mg by mouth daily.  levothyroxine (SYNTHROID) 112 mcg tablet Take 112 mcg by mouth Daily (before breakfast).       [DISCONTINUED] esomeprazole (NEXIUM) 40 mg capsule Take 40 mg by mouth daily. (Patient not taking: Reported on 2/15/2022)      [DISCONTINUED] dilTIAZem ER (CARDIZEM SR) 60 mg capsule Take 1 Capsule by mouth two (2) times a day. (Patient not taking: Reported on 2/15/2022) 60 Capsule 1    [DISCONTINUED] metoprolol tartrate (LOPRESSOR) 25 mg tablet Take 25 mg by mouth two (2) times a day. (Patient not taking: Reported on 2/15/2022)      [DISCONTINUED] amiodarone (CORDARONE) 200 mg tablet Take 200 mg by mouth daily. (Patient not taking: Reported on 2/15/2022)      [DISCONTINUED] allopurinoL (ZYLOPRIM) 300 mg tablet Take 1 Tab by mouth daily. (Patient not taking: Reported on 1/28/2022) 30 Tab 0    [DISCONTINUED] furosemide (LASIX) 40 mg tablet Take 20 mg by mouth every other day. (Patient not taking: Reported on 2/15/2022)       No current facility-administered medications on file prior to visit. Allergies   Allergen Reactions    Ace Inhibitors Angioedema    Lisinopril Angioedema       Objective:  Visit Vitals  /64   Pulse 62   Temp 98.8 °F (37.1 °C) (Temporal)   Resp 20   Ht 5' 7\" (1.702 m)   Wt 167 lb (75.8 kg)   SpO2 99%   BMI 26.16 kg/m²    Body mass index is 26.16 kg/m².     Physical assessment  Physical Exam      Labwork and Ancillary Studies:    CBC w/Diff  Lab Results   Component Value Date/Time    WBC 5.8 10/01/2021 09:42 AM    HGB 11.3 (L) 10/01/2021 09:42 AM    PLATELET 348 51/74/8934 09:42 AM         Basic Metabolic Profile  Lab Results   Component Value Date/Time    Sodium 143 10/01/2021 09:42 AM    Potassium 5.3 10/01/2021 09:42 AM    Chloride 116 (H) 10/01/2021 09:42 AM    CO2 20 (L) 10/01/2021 09:42 AM    Anion gap 7 10/01/2021 09:42 AM    Glucose 149 (H) 10/01/2021 09:42 AM    BUN 46 (H) 10/01/2021 09:42 AM    Creatinine 3.40 (H) 10/01/2021 09:42 AM    BUN/Creatinine ratio 14 10/01/2021 09:42 AM    GFR est AA 22 (L) 10/01/2021 09:42 AM    GFR est non-AA 18 (L) 10/01/2021 09:42 AM    Calcium 8.7 10/01/2021 09:42 AM        Cholesterol  Lab Results   Component Value Date/Time    Cholesterol, total 74 10/01/2021 09:42 AM    HDL Cholesterol 23 (L) 10/01/2021 09:42 AM    LDL, calculated 26 10/01/2021 09:42 AM    Triglyceride 125 10/01/2021 09:42 AM    CHOL/HDL Ratio 3.2 10/01/2021 09:42 AM           I have discussed the diagnosis with the patient and the intended plan as seen in the above orders. The patient has received an After-Visit Summary and questions were answered concerning future plans. An After Visit Summary was printed and given to the patient. All diagnosis have been discussed with the patient and all of the patient's questions have been answered. Follow-up and Dispositions    · Return in about 3 months (around 5/15/2022) for [address] [place] [address], [facility], office only, 30 min. José Miguel Franco, [facility]  [address]  [id number]

## 2022-02-15 NOTE — PROGRESS NOTES
Room 7    Did patient bring someone? No    Did the patient have DME equipment? No    Did you take your medication today? Yes       1. \"Have you been to the ER, urgent care clinic since your last visit? Hospitalized since your last visit? \" Yes Patient states I went to [facility] for not being able to get up    2. \"Have you seen or consulted any other health care providers outside of the [address] [place] [address] since your last visit? \" No     3. For patients aged 39-70: Has the patient had a colonoscopy / FIT/ Cologuard? Yes - no Care Gap present      If the patient is female:    4. For patients aged 41-77: Has the patient had a mammogram within the past 2 years? NA - based on age or sex      11. For patients aged 21-65: Has the patient had a pap smear?  NA - based on age or sex        1 most recent PHQ Screens 2/15/2022   Little interest or pleasure in doing things Not at all   Feeling down, depressed, irritable, or hopeless Not at all   Total Score PHQ 2 0         Health Maintenance Due   Topic Date Due    Hepatitis C Screening  Never done    Shingrix Vaccine Age 50> (1 of 2) Never done    Low dose CT lung screening  Never done    AAA Screening 73-69 YO Male Smoking Patients  Never done    Flu Vaccine (1) 09/01/2021    COVID-19 Vaccine (3 - Booster for Moderna series) 09/15/2021       Learning Assessment 1/27/2020   PRIMARY LEARNER Patient   HIGHEST LEVEL OF EDUCATION - PRIMARY LEARNER  4 YEARS OF COLLEGE   BARRIERS PRIMARY LEARNER NONE   CO-LEARNER CAREGIVER No   PRIMARY LANGUAGE ENGLISH   LEARNER PREFERENCE PRIMARY DEMONSTRATION   ANSWERED BY Alex Huston   RELATIONSHIP SELF

## 2022-02-17 ENCOUNTER — TELEPHONE (OUTPATIENT)
Dept: FAMILY MEDICINE CLINIC | Age: 77
End: 2022-02-17

## 2022-02-17 NOTE — TELEPHONE ENCOUNTER
----- Message from Alley Ashley sent at 2/16/2022  5:09 PM EST -----  Subject: Message to Provider    QUESTIONS  Information for Provider? Daphney's home health nurse called to report   that lincolns BP was 161/74 at 4:30 and was 158/80 at 5:00. No symptoms   to report. If need to reach [facility] please call? [phone]  ---------------------------------------------------------------------------  --------------  CALL BACK INFO  What is the best way for the office to contact you? OK to leave message on   voicemail  Preferred Call Back Phone Number? [phone]  ---------------------------------------------------------------------------  --------------  SCRIPT ANSWERS  Relationship to Patient? Third Party  Representative Name?  [facility] health nurse

## 2022-02-22 NOTE — TELEPHONE ENCOUNTER
Spoke to patient about  scheduling an appointment for blood pressure running high. Patient states I well let my doctor no about it when I go the [place] . Patient verbalized understanding.

## 2022-06-17 ENCOUNTER — DOCUMENTATION ONLY (OUTPATIENT)
Dept: PULMONOLOGY | Age: 77
End: 2022-06-17

## 2023-12-20 NOTE — PROGRESS NOTES
Problem: Discharge Planning  Goal: Discharge to home or other facility with appropriate resources  Outcome: Progressing  Flowsheets (Taken 12/20/2023 1045)  Discharge to home or other facility with appropriate resources: Identify barriers to discharge with patient and caregiver     Problem: Safety - Adult  Goal: Free from fall injury  Outcome: Progressing  Flowsheets (Taken 12/20/2023 1101)  Free From Fall Injury: Instruct family/caregiver on patient safety RENAL PROGRESS [facility] Assessment/Plan: · KATHE (secondary to reduced renal perfusion in a setting of A.fib with rvr and COPD exacerbation). Improving. 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 Exact baseline is unclear. · A.fib. Rate is better controlled. Card on the case. On ac with coumadin. · HFrEF. Appears fairly euvolemic. No need for diuresis at this time. · COPD exacerbation. Improving. If d/meghann- patient is to f/u with nephrology at the [place].  
                                                                                                                            
Subjective:Patient complaints off: Feels better, sob is less but hasn't ambulated yet. No CP/N/V. Appetite is good. Wants to go home, agitated about it. Patient Active Problem List  
Diagnosis Code  Acute respiratory distress H08.80  
 Systolic heart failure (HCC) I50.20  A-fib ([facility] I48.91  
 Acute-on-chronic kidney injury ([facility] N17.9, N18.9  Acute exacerbation of chronic obstructive pulmonary disease (COPD) ([facility] J44.1 Current Facility-Administered Medications Medication Dose Route Frequency Provider Last Rate Last Dose  dilTIAZem CD (CARDIZEM CD) capsule 240 mg  240 mg Oral DAILY Gene Barraza MD   240 mg at 04/23/19 4854  dilTIAZem CD (CARDIZEM CD) capsule 120 mg  120 mg Oral ONCE Merrick Carpenter MD   Stopped at 04/22/19 2300  
 guaiFENesin ER (MUCINEX) tablet 1,200 mg  1,200 mg Oral BID Annye Lobe H, DO   1,200 mg at 04/23/19 9899  pantoprazole (PROTONIX) tablet 40 mg  40 mg Oral ACB Annye Lobe H, DO   40 mg at 04/23/19 1204  metoprolol (LOPRESSOR) injection 5 mg  5 mg IntraVENous PRN Annye Lobe H, DO   5 mg at 04/21/19 2318  benzonatate (TESSALON) capsule 100 mg  100 mg Oral TID PRN Gene Barraza MD   100 mg at 04/22/19 0100  
 albuterol-ipratropium (DUO-NEB) 2.5 MG-0.5 MG/3 ML  3 mL Nebulization Q4H PRN Jesus Boers H, DO   3 mL at 04/20/19 1757  metoprolol tartrate (LOPRESSOR) tablet 100 mg  100 mg Oral Q12H Munira DAMON PA-C   100 mg at 04/23/19 0911  
 insulin glargine (LANTUS) injection 5 Units  5 Units SubCUTAneous DAILY CARMELO Rice   5 Units at 04/22/19 0845  
 glucose chewable tablet 16 g  4 Tab Oral PRN Ricardo Arrieta PA-C      
 glucagon (GLUCAGEN) injection 1 mg  1 mg IntraMUSCular PRN Arlyce Prudent NOAH DAMON      
 dextrose (D50) infusion 12.5-25 g  25-50 mL IntraVENous PRN Arlyce Prudent NOAH DAMON      
 acetaminophen (TYLENOL) tablet 650 mg  650 mg Oral Q4H PRN Arlyce Prudent NOAH DAMON      
 ondansetron (ZOFRAN ODT) tablet 4 mg  4 mg Oral Q6H PRN Jose Black PA-C      
 arformoterol (BROVANA) neb solution 15 mcg  15 mcg Nebulization BID RT Ricardo Arrieta PA-C   15 mcg at 04/23/19 9256  budesonide (PULMICORT) 500 mcg/2 ml nebulizer suspension  500 mcg Nebulization BID RT Ricardo Arrieta PA-C   500 mcg at 04/23/19 3812  allopurinol (ZYLOPRIM) tablet 100 mg  100 mg Oral DAILY Ricardo Arrieta PA-C   100 mg at 04/23/19 7532  aspirin delayed-release tablet 81 mg  81 mg Oral DAILY Ricardo Arrieta PA-C   81 mg at 04/23/19 0911  
 atorvastatin (LIPITOR) tablet 40 mg  40 mg Oral DAILY Ricardo Arrieta PA-C   40 mg at 04/23/19 3530  cholecalciferol (VITAMIN D3) tablet 2,000 Units  2,000 Units Oral DAILY Ricardo Arrieta PA-C   2,000 Units at 04/23/19 6833  phenytoin ER (DILANTIN ER) ER capsule 300 mg  300 mg Oral QHS Ricardo Arrieta PA-C   300 mg at 04/22/19 2132  tamsulosin (FLOMAX) capsule 0.4 mg  0.4 mg Oral DAILY Ricardo Arrieta PA-C   0.4 mg at 04/23/19 6594  methylPREDNISolone (PF) (SOLU-MEDROL) injection 60 mg  60 mg IntraVENous Q6H Ricardo Arrieta PA-C   60 mg at 04/23/19 6158  WARFARIN INFORMATION NOTE (COUMADIN)   Other Rx Dosing/Monitoring Lori Jha.NOAH      
  insulin lispro (HUMALOG) injection   SubCUTAneous AC&HS Vernona Catena, DO   2 Units at 04/22/19 3547  warfarin (COUMADIN) tablet 5 mg  5 mg Oral DAILY Ether Starring H, DO   5 mg at 04/22/19 9878 Objective Vitals:  
 04/23/19 0003 04/23/19 1839 04/23/19 9881 04/23/19 1011 BP:  125/78 176/72 Pulse:  97 96 Resp:  18 18 Temp:  97.8 °F (36.6 °C) 98.1 °F (36.7 °C) SpO2:  97% 98% 96% Weight: 75.2 kg (165 lb 11.2 oz) Height:      
 
 
 
Intake/Output Summary (Last 24 hours) at 4/23/2019 3211 Last data filed at 4/23/2019 8174 Gross per 24 hour Intake 480 ml Output 1350 ml Net -870 ml Admission weight: Weight: 81.6 kg (180 lb) (04/18/19 1154) Last Weight Metrics: 
Weight Loss Metrics 4/23/2019 4/18/2019 Today's Wt 165 lb 11.2 oz -  
BMI - 25.95 kg/m2 Physical Assessment:  
 
General: NAD, alert and oriented. Neck: No jvd. LUNGS: loud bl exp wheezes. CVS EXM: S1, S2, irregular. Abdomen: soft, non tender. Lower Extremities:  trace edema. Lab CBC w/Diff Recent Labs  
  04/23/19 
0555 04/22/19 
0449 04/21/19 
5321 WBC 10.1 9.1 11.8  
RBC 4.29* 4.46* 4.52* HGB 11.5* 11.9* 11.9*  
HCT 36.8 38.4 39.2  213 215 Chemistry Recent Labs  
  04/23/19 
0555 04/22/19 
0449 04/21/19 
7897 * 190* 160*  143 143  
K 4.8 4.5 4.3 * 111* 110* CO2 24 24 22 BUN 86* 97* 95* CREA 2.11* 2.63* 3.10* CA 8.5 8.0* 8.3* AGAP 8 8 11 BUCR 41* 37* 31* ALB 2.6* 2.6* 2.7* PHOS 3.7 3.5 4.8 No results found for: IRON, FE, TIBC, IBCT, PSAT, FERR Lab Results Component Value Date/Time Calcium 8.5 04/23/2019 05:55 AM  
 Phosphorus 3.7 04/23/2019 05:55 AM  
  
 
Trina Silva M.D. [facility] Phone [phone] Pager [phone]

## 2024-02-28 NOTE — PROGRESS NOTES
Problem: Discharge Planning Goal: *Discharge to safe environment Outcome: Progressing Towards Goal 
  
 There are no Wet Read(s) to document.